# Patient Record
Sex: FEMALE | Race: WHITE | NOT HISPANIC OR LATINO | Employment: OTHER | URBAN - NONMETROPOLITAN AREA
[De-identification: names, ages, dates, MRNs, and addresses within clinical notes are randomized per-mention and may not be internally consistent; named-entity substitution may affect disease eponyms.]

---

## 2016-12-29 LAB — INR PPP: 2.1

## 2017-01-04 ENCOUNTER — ANTICOAGULATION VISIT (OUTPATIENT)
Dept: CARDIOLOGY | Facility: CLINIC | Age: 77
End: 2017-01-04

## 2017-01-13 ENCOUNTER — ANTICOAGULATION VISIT (OUTPATIENT)
Dept: CARDIOLOGY | Facility: CLINIC | Age: 77
End: 2017-01-13

## 2017-01-13 LAB — INR PPP: 2.2

## 2017-01-16 NOTE — PROGRESS NOTES
I have reviewed the notes, assessments, and/or procedures performed by Roberto Hines RN, I concur with her/his documentation of Kirsten Rosales.

## 2017-01-27 ENCOUNTER — ANTICOAGULATION VISIT (OUTPATIENT)
Dept: CARDIOLOGY | Facility: CLINIC | Age: 77
End: 2017-01-27

## 2017-01-27 LAB — INR PPP: 2.3

## 2017-01-27 NOTE — PROGRESS NOTES
I have reviewed the notes, assessments, and/or procedures performed by Roberto Hines RN, and concur with her documentation of Kirsten Rosales.

## 2017-01-27 NOTE — PROGRESS NOTES
DX I48.91, PT instructed to continue same dosing regimen as ordered  Pt is using home monitoring  No s/s redness or bleeding

## 2017-01-31 RX ORDER — ISOSORBIDE MONONITRATE 30 MG/1
TABLET, EXTENDED RELEASE ORAL
Qty: 30 TABLET | Refills: 5 | Status: SHIPPED | OUTPATIENT
Start: 2017-01-31 | End: 2017-08-01 | Stop reason: SDUPTHER

## 2017-02-13 ENCOUNTER — ANTICOAGULATION VISIT (OUTPATIENT)
Dept: CARDIOLOGY | Facility: CLINIC | Age: 77
End: 2017-02-13

## 2017-02-13 LAB
INR PPP: 2.2
INR PPP: 2.6

## 2017-02-14 NOTE — PROGRESS NOTES
I have reviewed the notes, assessments, and/or procedures performed by Roberto Fam RN and concur with her documentation of Kirsten Rosales.

## 2017-03-01 ENCOUNTER — ANTICOAGULATION VISIT (OUTPATIENT)
Dept: CARDIOLOGY | Facility: CLINIC | Age: 77
End: 2017-03-01

## 2017-03-01 LAB — INR PPP: 2.5

## 2017-03-31 ENCOUNTER — ANTICOAGULATION VISIT (OUTPATIENT)
Dept: CARDIOLOGY | Facility: CLINIC | Age: 77
End: 2017-03-31

## 2017-03-31 LAB
INR PPP: 2.3
INR PPP: 2.3

## 2017-04-12 ENCOUNTER — CLINICAL SUPPORT (OUTPATIENT)
Dept: CARDIOLOGY | Facility: CLINIC | Age: 77
End: 2017-04-12

## 2017-04-12 DIAGNOSIS — I49.5 SSS (SICK SINUS SYNDROME) (HCC): Primary | ICD-10-CM

## 2017-04-12 PROCEDURE — 93288 INTERROG EVL PM/LDLS PM IP: CPT | Performed by: INTERNAL MEDICINE

## 2017-04-12 NOTE — PROGRESS NOTES
77 years old patient with history of sick sinus syndrome status post pacemaker implantation.  [employer]ufact[employer] model #2210 and serial number [id number].  Battery is status 2.9 with longevity is 6-7 year.  Date of implant is to 12 2014 and date of interrogation 4/20/2017.  He syndrome ventricle approximately 50% and atrium less than 5% of time.  The patient to underlying atrial fibrillation of the time of pacemaker interrogation.  Programmed DDD at rate of 65 and 120.  AV delay 2:50 PM PV delay to 25.  Sensing A. fib with 2.7 with a lead impedance 300.  Sensing R-wave 6 with a lead impedance 600 and threshold 0.7 at 0.5.  Clinical impression normal function pacemaker with underlying atrial fibrillation recommend to follow up in 6 month

## 2017-04-13 ENCOUNTER — ANTICOAGULATION VISIT (OUTPATIENT)
Dept: CARDIOLOGY | Facility: CLINIC | Age: 77
End: 2017-04-13

## 2017-04-13 LAB — INR PPP: 2.1

## 2017-04-25 RX ORDER — WARFARIN SODIUM 5 MG/1
TABLET ORAL
Qty: 30 TABLET | Refills: 5 | Status: CANCELLED | OUTPATIENT
Start: 2017-04-25

## 2017-04-26 RX ORDER — WARFARIN SODIUM 5 MG/1
5 TABLET ORAL DAILY
Qty: 30 TABLET | Refills: 6 | Status: SHIPPED | OUTPATIENT
Start: 2017-04-26 | End: 2018-01-22 | Stop reason: SDUPTHER

## 2017-05-22 RX ORDER — METOPROLOL SUCCINATE 50 MG/1
TABLET, EXTENDED RELEASE ORAL
Qty: 180 TABLET | Refills: 1 | Status: SHIPPED | OUTPATIENT
Start: 2017-05-22 | End: 2017-11-17 | Stop reason: SDUPTHER

## 2017-06-23 ENCOUNTER — ANTICOAGULATION VISIT (OUTPATIENT)
Dept: CARDIOLOGY | Facility: CLINIC | Age: 77
End: 2017-06-23

## 2017-06-23 LAB — INR PPP: 2.1

## 2017-06-27 ENCOUNTER — OFFICE VISIT (OUTPATIENT)
Dept: CARDIOLOGY | Facility: CLINIC | Age: 77
End: 2017-06-27

## 2017-06-27 VITALS
HEIGHT: 68 IN | DIASTOLIC BLOOD PRESSURE: 78 MMHG | BODY MASS INDEX: 26.22 KG/M2 | WEIGHT: 173 LBS | HEART RATE: 80 BPM | SYSTOLIC BLOOD PRESSURE: 121 MMHG

## 2017-06-27 DIAGNOSIS — I25.119 CORONARY ARTERY DISEASE INVOLVING NATIVE CORONARY ARTERY OF NATIVE HEART WITH ANGINA PECTORIS (HCC): Primary | ICD-10-CM

## 2017-06-27 DIAGNOSIS — I49.5 SSS (SICK SINUS SYNDROME) (HCC): ICD-10-CM

## 2017-06-27 DIAGNOSIS — I10 ESSENTIAL HYPERTENSION: ICD-10-CM

## 2017-06-27 DIAGNOSIS — E78.2 MIXED HYPERLIPIDEMIA: ICD-10-CM

## 2017-06-27 PROCEDURE — 99213 OFFICE O/P EST LOW 20 MIN: CPT | Performed by: INTERNAL MEDICINE

## 2017-06-27 RX ORDER — MIRTAZAPINE 15 MG/1
7.5 TABLET, FILM COATED ORAL NIGHTLY
COMMUNITY
End: 2021-03-17

## 2017-06-27 NOTE — PROGRESS NOTES
Kirsten Rosales  77 y.o. female      1. Coronary artery disease involving native coronary artery of native heart with angina pectoris    2. Mixed hyperlipidemia    3. Essential hypertension    4. SSS (sick sinus syndrome)        Chief complaint -follow-up CAD and Chronic A. fib      History of present Illness-68 to lady who has history of CAD prior stent placement to diagonal artery, had a cardiac catheterization  in February 2016, stents were patent and EF is 45-50%, she also has a pacemaker and she is now in persistent atrial fibrillation and is on chronic Coumadin therapy and INR is Between 2 and 3.  She is doing well she has no complaints.  She is on good medical therapy and her heart rate is well controlled and she is appropriately anticoagulate with Coumadin.  She has no GI symptoms or CNS symptoms            Allergies   Allergen Reactions   • Aspirin Nausea And Vomiting   • Celebrex [Celecoxib]    • Rythmol [Propafenone] Nausea And Vomiting         Past Medical History:   Diagnosis Date   • Atherosclerotic heart disease of native coronary artery with unspecified angina pectoris    • Atrial fibrillation    • Breath shortness    • CAD (coronary artery disease)    • Hyperlipidemia    • Hypertension    • Long term (current) use of anticoagulants    • Sick sinus syndrome    • Unspecified hypothyroidism          Past Surgical History:   Procedure Laterality Date   • CARDIAC CATHETERIZATION      2/2016   • CARDIOVERSION     • CORONARY ANGIOPLASTY     • PACEMAKER IMPLANTATION           History reviewed. No pertinent family history.      Social History     Social History   • Marital status:      Spouse name: N/A   • Number of children: N/A   • Years of education: N/A     Occupational History   • Not on file.     Social History Main Topics   • Smoking status: Former Smoker   • Smokeless tobacco: Never Used   • Alcohol use No   • Drug use: No   • Sexual activity: Defer     Other Topics Concern   • Not on  "file     Social History Narrative         Current Outpatient Prescriptions   Medication Sig Dispense Refill   • isosorbide mononitrate (IMDUR) 30 MG 24 hr tablet TAKE ONE TABLET BY MOUTH DAILY 30 tablet 5   • levothyroxine (SYNTHROID) 112 MCG tablet Take 112 mcg by mouth Daily.     • metFORMIN (GLUCOPHAGE) 500 MG tablet Take 500 mg by mouth 2 (Two) Times a Day.     • metoprolol succinate XL (TOPROL-XL) 50 MG 24 hr tablet TAKE ONE TABLET BY MOUTH TWICE A  tablet 1   • mirtazapine (REMERON) 7.5 MG half tablet Take 15 mg by mouth Every Night.     • Multiple Minerals-Vitamins (CALCIUM-MAGNESIUM-ZINC-D3) tablet Take 1 tablet by mouth Daily.     • Omega-3 Fatty Acids (OMEGA 3 PO) Take 2,000 mg by mouth Daily.     • oxybutynin XL (DITROPAN-XL) 10 MG 24 hr tablet Take 10 mg by mouth Daily.     • pravastatin (PRAVACHOL) 40 MG tablet TAKE ONE TABLET BY MOUTH EVERY EVENING 90 tablet 2   • Ubiquinone (ULTRA COQ10 PO) Take 1 tablet by mouth Daily.     • warfarin (COUMADIN) 5 MG tablet Take 1 tablet by mouth Daily. 30 tablet 6     No current facility-administered medications for this visit.          Review of Systems     Constitution: Denies any fatigue, fever or chills    HENT: Denies any headache, hearing impairment, nasal discharge or sore throat    Eyes: Denies any blurring of vision, or photophobia     Cardivascular - As per history of present illness     Respiratory system-denies any COPD, shortness of breath, sleep apnea.     Endocrine:   history of hyperlipidemia, diabetes       Musculoskeletal:   history of osteoarthritis    Gastrointestinal: No nausea, vomiting, or melena    Genitourinary: No dysuria or hematuria    Neurological:   No history of seizure disorder, stroke, memory problems    Psychiatric/Behavioral:        No history of depression, bipolar disorder or schizophrenia     Hematological- no history of easy bruising or any bleeding diathesis            OBJECTIVE    /78  Pulse 80  Ht 68\" (172.7 " cm)  Wt 173 lb (78.5 kg)  BMI 26.3 kg/m2      Physical Exam     Constitutional: is oriented to person, place, and time.     Skin-warm and dry    Well developed and nourished in no acute distress      Head: Normocephalic and atraumatic.     Eyes: Pupils are equal, round, and reactive to light.     Neck: Neck supple. No bruit in the carotids, no elevation of JVD    Cardiovascular: [place] in the fifth intercostal space, irregular rate, and  Rhythm,  S1 greater than S2, no S3 or S4, no gallop       Pulmonary/Chest:   Air  Entry is equal on both sides  No wheezing or crackles,      Abdominal: Soft.  No hepatosplenomegaly, bowel sounds are present    Musculoskeletal: No kyphoscoliosis, no significant thickening of the joints    Neurological: is alert and oriented to person, place, and time.    cranial nerve are intact .   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                  His behavior is normal.           Procedures      Lab Results   Component Value Date    WBC 5.2 02/18/2016    HGB 13.6 02/18/2016    HCT 41.4 02/18/2016    MCV 89.0 02/18/2016     02/18/2016     Lab Results   Component Value Date    GLUCOSE 121 (H) 02/18/2016    BUN 18 02/18/2016    CREATININE 0.6 02/18/2016    CO2 29 02/18/2016    CALCIUM 9.2 02/18/2016    ALBUMIN 4.4 02/09/2016    AST 55 (H) 02/09/2016    ALT 22 02/09/2016     No results found for: CHOL  Lab Results   Component Value Date    TRIG 138 02/09/2016    TRIG 95 02/10/2015     No components found for: CHOLHDL  Lab Results   Component Value Date    HGBA1C 6.3 (H) 02/09/2016     Lab Results   Component Value Date    TSH 0.98 02/09/2016                  A/P    CAD prior stent placement to diagonal artery, had cardiac catheterization in February 2016 stents are patent.  EF is 45-50%.  No evidence of congestive heart failure.  She is on good medical therapy.    Hypertension well controlled with  isosorbide and Toprol-XL.    Persistent atrial fibrillation warfarin and Toprol-XL for rate control.We will do the blood work today    Hyperlipidemia on pravastatin, and metformin for diabetes and the most recent A1c was 6.3, and takes Synthroid for hypothyroidism.  Follow-up with me in 6 months        Nadeem Alejandro MD  6/27/2017  1:58 PM

## 2017-07-07 ENCOUNTER — APPOINTMENT (OUTPATIENT)
Dept: LAB | Facility: HOSPITAL | Age: 77
End: 2017-07-07

## 2017-07-07 LAB
ALBUMIN SERPL-MCNC: 4.2 G/DL (ref 3.4–4.8)
ALBUMIN/GLOB SERPL: 1.6 G/DL (ref 1.1–1.8)
ALP SERPL-CCNC: 93 U/L (ref 38–126)
ALT SERPL W P-5'-P-CCNC: 32 U/L (ref 9–52)
ANION GAP SERPL CALCULATED.3IONS-SCNC: 12 MMOL/L (ref 5–15)
ARTICHOKE IGE QN: 32 MG/DL (ref 1–129)
AST SERPL-CCNC: 54 U/L (ref 14–36)
BILIRUB SERPL-MCNC: 0.6 MG/DL (ref 0.2–1.3)
BUN BLD-MCNC: 18 MG/DL (ref 7–21)
BUN/CREAT SERPL: 26.5 (ref 7–25)
CALCIUM SPEC-SCNC: 9.5 MG/DL (ref 8.4–10.2)
CHLORIDE SERPL-SCNC: 101 MMOL/L (ref 95–110)
CHOLEST SERPL-MCNC: 135 MG/DL (ref 0–199)
CO2 SERPL-SCNC: 26 MMOL/L (ref 22–31)
CREAT BLD-MCNC: 0.68 MG/DL (ref 0.5–1)
DEPRECATED RDW RBC AUTO: 46 FL (ref 36.4–46.3)
ERYTHROCYTE [DISTWIDTH] IN BLOOD BY AUTOMATED COUNT: 13.9 % (ref 11.5–14.5)
GFR SERPL CREATININE-BSD FRML MDRD: 83 ML/MIN/1.73 (ref 60–90)
GLOBULIN UR ELPH-MCNC: 2.6 GM/DL (ref 2.3–3.5)
GLUCOSE BLD-MCNC: 105 MG/DL (ref 60–100)
HCT VFR BLD AUTO: 44.7 % (ref 35–45)
HDLC SERPL-MCNC: 52 MG/DL (ref 60–200)
HGB BLD-MCNC: 14.5 G/DL (ref 12–15.5)
LDLC/HDLC SERPL: 0.98 {RATIO} (ref 0–3.22)
MCH RBC QN AUTO: 29.3 PG (ref 26.5–34)
MCHC RBC AUTO-ENTMCNC: 32.4 G/DL (ref 31.4–36)
MCV RBC AUTO: 90.3 FL (ref 80–98)
PLATELET # BLD AUTO: 260 10*3/MM3 (ref 150–450)
PMV BLD AUTO: 12 FL (ref 8–12)
POTASSIUM BLD-SCNC: 4.9 MMOL/L (ref 3.5–5.1)
PROT SERPL-MCNC: 6.8 G/DL (ref 6.3–8.6)
RBC # BLD AUTO: 4.95 10*6/MM3 (ref 3.77–5.16)
SODIUM BLD-SCNC: 139 MMOL/L (ref 137–145)
TRIGL SERPL-MCNC: 159 MG/DL (ref 20–199)
WBC NRBC COR # BLD: 5.72 10*3/MM3 (ref 3.2–9.8)

## 2017-07-07 PROCEDURE — 80061 LIPID PANEL: CPT | Performed by: INTERNAL MEDICINE

## 2017-07-07 PROCEDURE — 36415 COLL VENOUS BLD VENIPUNCTURE: CPT | Performed by: INTERNAL MEDICINE

## 2017-07-07 PROCEDURE — 80053 COMPREHEN METABOLIC PANEL: CPT | Performed by: INTERNAL MEDICINE

## 2017-07-07 PROCEDURE — 85027 COMPLETE CBC AUTOMATED: CPT | Performed by: INTERNAL MEDICINE

## 2017-08-01 RX ORDER — ISOSORBIDE MONONITRATE 30 MG/1
TABLET, EXTENDED RELEASE ORAL
Qty: 30 TABLET | Refills: 4 | Status: SHIPPED | OUTPATIENT
Start: 2017-08-01 | End: 2017-12-27 | Stop reason: SDUPTHER

## 2017-08-25 ENCOUNTER — ANTICOAGULATION VISIT (OUTPATIENT)
Dept: CARDIOLOGY | Facility: CLINIC | Age: 77
End: 2017-08-25

## 2017-08-25 LAB — INR PPP: 2.1

## 2017-09-11 RX ORDER — PRAVASTATIN SODIUM 40 MG
TABLET ORAL
Qty: 90 TABLET | Refills: 1 | Status: CANCELLED | OUTPATIENT
Start: 2017-09-11

## 2017-09-12 RX ORDER — PRAVASTATIN SODIUM 40 MG
40 TABLET ORAL DAILY
Qty: 90 TABLET | Refills: 2 | Status: SHIPPED | OUTPATIENT
Start: 2017-09-12 | End: 2018-08-23 | Stop reason: SDUPTHER

## 2017-09-15 ENCOUNTER — ANTICOAGULATION VISIT (OUTPATIENT)
Dept: CARDIOLOGY | Facility: CLINIC | Age: 77
End: 2017-09-15

## 2017-09-15 LAB — INR PPP: 2.5

## 2017-10-06 ENCOUNTER — ANTICOAGULATION VISIT (OUTPATIENT)
Dept: CARDIOLOGY | Facility: CLINIC | Age: 77
End: 2017-10-06

## 2017-10-06 LAB — INR PPP: 2.7

## 2017-10-11 ENCOUNTER — CLINICAL SUPPORT (OUTPATIENT)
Dept: CARDIOLOGY | Facility: CLINIC | Age: 77
End: 2017-10-11

## 2017-10-11 DIAGNOSIS — I49.5 SSS (SICK SINUS SYNDROME) (HCC): Primary | ICD-10-CM

## 2017-10-11 PROCEDURE — 93288 INTERROG EVL PM/LDLS PM IP: CPT | Performed by: INTERNAL MEDICINE

## 2017-10-11 NOTE — PROGRESS NOTES
77 years old patient with history of sick sinus syndrome status post pacemaker implantation.  [employer] model 2210 and serial #[id number].  Implantation date 2/12/2014 and interrogation 10/11/2017.  Battery voltage is good for 5-6 year.  Pacing the atrium about to pursue ventricle 50% of time.  Pacing programmed DDD at 60 and 1 20 bpm.  Sensing P wave 4.6 with a lead impedance 3:30.  Patient atrial fibrillation on oral intake ablations.  Sensing R-wave 8 with a lead impedance 650 and threshold 0.5 at 0.5.  Clinical impression normal function pacemaker recommend to follow up in 6 month

## 2017-10-27 ENCOUNTER — ANTICOAGULATION VISIT (OUTPATIENT)
Dept: CARDIOLOGY | Facility: CLINIC | Age: 77
End: 2017-10-27

## 2017-10-27 LAB — INR PPP: 2.4

## 2017-11-17 RX ORDER — METOPROLOL SUCCINATE 50 MG/1
TABLET, EXTENDED RELEASE ORAL
Qty: 180 TABLET | Refills: 0 | Status: SHIPPED | OUTPATIENT
Start: 2017-11-17 | End: 2018-02-14 | Stop reason: SDUPTHER

## 2017-12-07 ENCOUNTER — ANTICOAGULATION VISIT (OUTPATIENT)
Dept: CARDIOLOGY | Facility: CLINIC | Age: 77
End: 2017-12-07

## 2017-12-07 LAB — INR PPP: 2.5

## 2017-12-27 RX ORDER — ISOSORBIDE MONONITRATE 30 MG/1
TABLET, EXTENDED RELEASE ORAL
Qty: 30 TABLET | Refills: 3 | Status: CANCELLED | OUTPATIENT
Start: 2017-12-27

## 2017-12-27 RX ORDER — ISOSORBIDE MONONITRATE 30 MG/1
30 TABLET, EXTENDED RELEASE ORAL DAILY
Qty: 30 TABLET | Refills: 4 | Status: SHIPPED | OUTPATIENT
Start: 2017-12-27 | End: 2018-05-29 | Stop reason: SDUPTHER

## 2017-12-28 ENCOUNTER — ANTICOAGULATION VISIT (OUTPATIENT)
Dept: CARDIAC SURGERY | Facility: CLINIC | Age: 77
End: 2017-12-28

## 2017-12-28 DIAGNOSIS — Z79.01 LONG-TERM (CURRENT) USE OF ANTICOAGULANTS: ICD-10-CM

## 2017-12-28 DIAGNOSIS — I48.91 ATRIAL FIBRILLATION, UNSPECIFIED TYPE (HCC): ICD-10-CM

## 2017-12-28 LAB — INR PPP: 2.5

## 2017-12-28 RX ORDER — OXYBUTYNIN CHLORIDE 10 MG/1
10 TABLET, EXTENDED RELEASE ORAL DAILY
COMMUNITY
Start: 2017-10-03 | End: 2018-10-04

## 2017-12-28 RX ORDER — LEVOTHYROXINE SODIUM 112 UG/1
100 TABLET ORAL DAILY
COMMUNITY

## 2017-12-28 NOTE — PROGRESS NOTES
Received above INR from [facility]. Spoke with pt and instructed [facility] would be managing her INR. Confirmed pt has 5MG COUMADIN TABLET. PT states she has been taking 2.5mg M, W, and F for several months. Adjusted pt's dose in computer to reflect what pt reports actually taking. PT was given [facility] direct phone number and educated pt to call [facility] if meds change or with any bleeding issues/concerns. PT states she is having endo procedure and will begin holding Coumadin on 12/31. PT on Coumadin for afib and has never had blood clot. PT states she checks INR every 3 weeks. PT will check INR on 01/18.   Electronically signed by MICKIE Correa

## 2018-01-02 ENCOUNTER — OFFICE VISIT (OUTPATIENT)
Dept: CARDIOLOGY | Facility: CLINIC | Age: 78
End: 2018-01-02

## 2018-01-02 VITALS
HEART RATE: 72 BPM | HEIGHT: 68 IN | DIASTOLIC BLOOD PRESSURE: 70 MMHG | WEIGHT: 178 LBS | BODY MASS INDEX: 26.98 KG/M2 | SYSTOLIC BLOOD PRESSURE: 130 MMHG

## 2018-01-02 DIAGNOSIS — E78.2 MIXED HYPERLIPIDEMIA: ICD-10-CM

## 2018-01-02 DIAGNOSIS — I25.119 CORONARY ARTERY DISEASE INVOLVING NATIVE CORONARY ARTERY OF NATIVE HEART WITH ANGINA PECTORIS (HCC): Primary | ICD-10-CM

## 2018-01-02 DIAGNOSIS — R68.89 OTHER GENERAL SYMPTOMS AND SIGNS: ICD-10-CM

## 2018-01-02 DIAGNOSIS — I49.5 SSS (SICK SINUS SYNDROME) (HCC): ICD-10-CM

## 2018-01-02 DIAGNOSIS — I10 ESSENTIAL HYPERTENSION: ICD-10-CM

## 2018-01-02 DIAGNOSIS — I48.20 CHRONIC ATRIAL FIBRILLATION (HCC): ICD-10-CM

## 2018-01-02 PROCEDURE — 99214 OFFICE O/P EST MOD 30 MIN: CPT | Performed by: INTERNAL MEDICINE

## 2018-01-02 RX ORDER — DOCUSATE SODIUM 100 MG/1
100 CAPSULE, LIQUID FILLED ORAL AS NEEDED
COMMUNITY
End: 2021-03-17

## 2018-01-02 RX ORDER — PHENOL 1.4 %
20 AEROSOL, SPRAY (ML) MUCOUS MEMBRANE AS NEEDED
COMMUNITY
End: 2021-03-17

## 2018-01-02 NOTE — PROGRESS NOTES
Kirsten Rosales  77 y.o. female    1/2/2018  1. Coronary artery disease involving native coronary artery of native heart with angina pectoris    2. Mixed hyperlipidemia    3. Essential hypertension    4. SSS (sick sinus syndrome)    5. Other general symptoms and signs     6. Chronic atrial fibrillation        Chief complaint -follow-up CAD and Chronic Atrial fibrillation      History of present Illness-77 yr lady who has history of CAD prior stent placement to diagonal artery, had a cardiac catheterization  in February 2016, stents were patent and EF is 45-50%, she also has a pacemaker and she is now in persistent atrial fibrillation and is on chronic Coumadin therapy and INR is Between 2 and 3.  She is doing well she has no complaints.  She is on good medical therapy and her heart rate is well controlled and she is appropriately anticoagulate with Coumadin.  She has no GI symptoms or CNS symptoms.She is going to stop the Coumadin for 4 days prior to her colonoscopy soon and then we'll restart when okay with Dr. Juan.            Allergies   Allergen Reactions   • Aspirin Nausea And Vomiting   • Celebrex [Celecoxib]    • Rythmol [Propafenone] Nausea And Vomiting         Past Medical History:   Diagnosis Date   • Atherosclerotic heart disease of native coronary artery with unspecified angina pectoris    • Atrial fibrillation    • Breath shortness    • CAD (coronary artery disease)    • Hyperlipidemia    • Hypertension    • Long term (current) use of anticoagulants    • Sick sinus syndrome    • Unspecified hypothyroidism          Past Surgical History:   Procedure Laterality Date   • BACK SURGERY     • CARDIAC CATHETERIZATION      2/2016   • CARDIOVERSION     • CORONARY ANGIOPLASTY     • PACEMAKER IMPLANTATION           History reviewed. No pertinent family history.      Social History     Social History   • Marital status:      Spouse name: N/A   • Number of children: N/A   • Years of education: N/A      Occupational History   • Not on file.     Social History Main Topics   • Smoking status: Former Smoker   • Smokeless tobacco: Never Used   • Alcohol use No   • Drug use: No   • Sexual activity: Defer     Other Topics Concern   • Not on file     Social History Narrative         Current Outpatient Prescriptions   Medication Sig Dispense Refill   • docusate sodium (COLACE) 100 MG capsule Take 100 mg by mouth 2 (Two) Times a Day.     • isosorbide mononitrate (IMDUR) 30 MG 24 hr tablet Take 1 tablet by mouth Daily. 30 tablet 4   • levothyroxine (SYNTHROID, LEVOTHROID) 112 MCG tablet Take 112 mcg by mouth Daily.     • Melatonin 10 MG tablet Take 20 mg by mouth Every Night.     • metFORMIN (GLUCOPHAGE) 500 MG tablet Take 500 mg by mouth 2 (Two) Times a Day.     • metoprolol succinate XL (TOPROL-XL) 50 MG 24 hr tablet TAKE ONE TABLET BY MOUTH TWICE A  tablet 0   • mirtazapine (REMERON) 7.5 MG half tablet Take 7.5 mg by mouth Every Night.     • Multiple Minerals-Vitamins (CALCIUM-MAGNESIUM-ZINC-D3) tablet Take 1 tablet by mouth Daily.     • Omega-3 Fatty Acids (OMEGA 3 PO) Take 2,000 mg by mouth Daily.     • oxybutynin XL (DITROPAN-XL) 10 MG 24 hr tablet Take 10 mg by mouth.     • pravastatin (PRAVACHOL) 40 MG tablet Take 1 tablet by mouth Daily. 90 tablet 2   • Ubiquinone (ULTRA COQ10 PO) Take 1 tablet by mouth Daily.     • warfarin (COUMADIN) 5 MG tablet Take 1 tablet by mouth Daily. 30 tablet 6     No current facility-administered medications for this visit.          Review of Systems     Constitution: Denies any fatigue, fever or chills    HENT: Denies any headache, hearing impairment, nasal discharge or sore throat    Eyes: Denies any blurring of vision, or photophobia     Cardivascular - As per history of present illness     Respiratory system-denies any COPD, shortness of breath, sleep apnea.     Endocrine:   history of hyperlipidemia, diabetes       Musculoskeletal:   history of  "osteoarthritis    Gastrointestinal: No nausea, vomiting, or melena    Genitourinary: No dysuria or hematuria    Neurological:   No history of seizure disorder, stroke, memory problems    Psychiatric/Behavioral:        No history of depression, bipolar disorder or schizophrenia     Hematological- no history of easy bruising or any bleeding diathesis            OBJECTIVE    /70  Pulse 72  Ht 172.7 cm (68\")  Wt 80.7 kg (178 lb)  BMI 27.06 kg/m2      Physical Exam     Constitutional: is oriented to person, place, and time.     Skin-warm and dry    Well developed and nourished in no acute distress      Head: Normocephalic and atraumatic.     Eyes: Pupils are equal, round, and reactive to light.     Neck: Neck supple. No bruit in the carotids, no elevation of JVD    Cardiovascular: [place] in the fifth intercostal space, irregular rate, and  Rhythm,  S1 greater than S2, no S3 or S4, no gallop       Pulmonary/Chest:   Air  Entry is equal on both sides  No wheezing or crackles,      Abdominal: Soft.  No hepatosplenomegaly, bowel sounds are present    Musculoskeletal: No kyphoscoliosis, no significant thickening of the joints    Neurological: is alert and oriented to person, place, and time.    cranial nerve are intact .   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                  His behavior is normal.           Procedures      Lab Results   Component Value Date    WBC 5.72 07/07/2017    HGB 14.5 07/07/2017    HCT 44.7 07/07/2017    MCV 90.3 07/07/2017     07/07/2017     Lab Results   Component Value Date    GLUCOSE 105 (H) 07/07/2017    BUN 18 07/07/2017    CREATININE 0.68 07/07/2017    EGFRIFNONA 83 07/07/2017    BCR 26.5 (H) 07/07/2017    CO2 26.0 07/07/2017    CALCIUM 9.5 07/07/2017    ALBUMIN 4.20 07/07/2017    LABIL2 1.6 07/07/2017    AST 54 (H) 07/07/2017    ALT 32 07/07/2017     Lab Results   Component Value Date "    CHOL 135 07/07/2017     Lab Results   Component Value Date    TRIG 159 07/07/2017    TRIG 138 02/09/2016    TRIG 95 02/10/2015     No components found for: CHOLHDL  Lab Results   Component Value Date    HGBA1C 6.3 (H) 02/09/2016     Lab Results   Component Value Date    TSH 0.98 02/09/2016                  A/P    CAD prior stent placement to diagonal artery, had cardiac catheterization in February 2016 stents are patent.  EF is 45-50%.  No evidence of congestive heart failure.  She is on good medical therapy.    Hypertension well controlled with isosorbide and Toprol-XL.    Persistent atrial fibrillation warfarin and Toprol-XL for rate control.    Hyperlipidemia on pravastatin, and metformin for diabetes and the most recent A1c was 6.3, and takes Synthroid for hypothyroidism.      Follow-up with me in 6 months        Nadeem Alejandro MD  1/2/2018  1:24 PM

## 2018-01-04 ENCOUNTER — ANESTHESIA (OUTPATIENT)
Dept: GASTROENTEROLOGY | Facility: HOSPITAL | Age: 78
End: 2018-01-04

## 2018-01-04 ENCOUNTER — HOSPITAL ENCOUNTER (OUTPATIENT)
Facility: HOSPITAL | Age: 78
Setting detail: HOSPITAL OUTPATIENT SURGERY
Discharge: HOME | End: 2018-01-04
Attending: INTERNAL MEDICINE | Admitting: INTERNAL MEDICINE

## 2018-01-04 ENCOUNTER — ANESTHESIA EVENT (OUTPATIENT)
Dept: GASTROENTEROLOGY | Facility: HOSPITAL | Age: 78
End: 2018-01-04

## 2018-01-04 VITALS
SYSTOLIC BLOOD PRESSURE: 106 MMHG | TEMPERATURE: 98 F | WEIGHT: 174.16 LBS | HEART RATE: 65 BPM | HEIGHT: 68 IN | DIASTOLIC BLOOD PRESSURE: 57 MMHG | OXYGEN SATURATION: 96 % | BODY MASS INDEX: 26.4 KG/M2 | RESPIRATION RATE: 18 BRPM

## 2018-01-04 DIAGNOSIS — Z86.010 HISTORY OF COLONIC POLYPS: ICD-10-CM

## 2018-01-04 PROCEDURE — 25010000002 PROPOFOL 10 MG/ML EMULSION: Performed by: NURSE ANESTHETIST, CERTIFIED REGISTERED

## 2018-01-04 PROCEDURE — 88305 TISSUE EXAM BY PATHOLOGIST: CPT | Performed by: PATHOLOGY

## 2018-01-04 PROCEDURE — 88305 TISSUE EXAM BY PATHOLOGIST: CPT | Performed by: INTERNAL MEDICINE

## 2018-01-04 DEVICE — CLIPPING DEVICE
Type: IMPLANTABLE DEVICE | Site: CECUM | Status: FUNCTIONAL
Brand: RESOLUTION CLIP

## 2018-01-04 RX ORDER — MEPERIDINE HYDROCHLORIDE 50 MG/ML
12.5 INJECTION INTRAMUSCULAR; INTRAVENOUS; SUBCUTANEOUS
Status: DISCONTINUED | OUTPATIENT
Start: 2018-01-04 | End: 2018-01-04 | Stop reason: HOSPADM

## 2018-01-04 RX ORDER — PROPOFOL 10 MG/ML
VIAL (ML) INTRAVENOUS AS NEEDED
Status: DISCONTINUED | OUTPATIENT
Start: 2018-01-04 | End: 2018-01-04 | Stop reason: SURG

## 2018-01-04 RX ORDER — PROMETHAZINE HYDROCHLORIDE 25 MG/ML
12.5 INJECTION, SOLUTION INTRAMUSCULAR; INTRAVENOUS ONCE AS NEEDED
Status: DISCONTINUED | OUTPATIENT
Start: 2018-01-04 | End: 2018-01-04 | Stop reason: HOSPADM

## 2018-01-04 RX ORDER — LIDOCAINE HYDROCHLORIDE 10 MG/ML
INJECTION, SOLUTION INFILTRATION; PERINEURAL AS NEEDED
Status: DISCONTINUED | OUTPATIENT
Start: 2018-01-04 | End: 2018-01-04 | Stop reason: SURG

## 2018-01-04 RX ORDER — PROMETHAZINE HYDROCHLORIDE 25 MG/1
25 SUPPOSITORY RECTAL ONCE AS NEEDED
Status: DISCONTINUED | OUTPATIENT
Start: 2018-01-04 | End: 2018-01-04 | Stop reason: HOSPADM

## 2018-01-04 RX ORDER — DEXAMETHASONE SODIUM PHOSPHATE 4 MG/ML
8 INJECTION, SOLUTION INTRA-ARTICULAR; INTRALESIONAL; INTRAMUSCULAR; INTRAVENOUS; SOFT TISSUE ONCE AS NEEDED
Status: DISCONTINUED | OUTPATIENT
Start: 2018-01-04 | End: 2018-01-04 | Stop reason: HOSPADM

## 2018-01-04 RX ORDER — DEXTROSE AND SODIUM CHLORIDE 5; .45 G/100ML; G/100ML
20 INJECTION, SOLUTION INTRAVENOUS CONTINUOUS
Status: DISCONTINUED | OUTPATIENT
Start: 2018-01-04 | End: 2018-01-04 | Stop reason: HOSPADM

## 2018-01-04 RX ORDER — ONDANSETRON 2 MG/ML
4 INJECTION INTRAMUSCULAR; INTRAVENOUS ONCE AS NEEDED
Status: DISCONTINUED | OUTPATIENT
Start: 2018-01-04 | End: 2018-01-04 | Stop reason: HOSPADM

## 2018-01-04 RX ORDER — PROMETHAZINE HYDROCHLORIDE 25 MG/1
25 TABLET ORAL ONCE AS NEEDED
Status: DISCONTINUED | OUTPATIENT
Start: 2018-01-04 | End: 2018-01-04 | Stop reason: HOSPADM

## 2018-01-04 RX ADMIN — PROPOFOL 20 MG: 10 INJECTION, EMULSION INTRAVENOUS at 11:41

## 2018-01-04 RX ADMIN — PROPOFOL 20 MG: 10 INJECTION, EMULSION INTRAVENOUS at 11:25

## 2018-01-04 RX ADMIN — PROPOFOL 20 MG: 10 INJECTION, EMULSION INTRAVENOUS at 11:35

## 2018-01-04 RX ADMIN — PROPOFOL 20 MG: 10 INJECTION, EMULSION INTRAVENOUS at 11:28

## 2018-01-04 RX ADMIN — LIDOCAINE HYDROCHLORIDE 50 MG: 10 INJECTION, SOLUTION INFILTRATION; PERINEURAL at 11:23

## 2018-01-04 RX ADMIN — PROPOFOL 20 MG: 10 INJECTION, EMULSION INTRAVENOUS at 11:30

## 2018-01-04 RX ADMIN — DEXTROSE AND SODIUM CHLORIDE 20 ML/HR: 5; 450 INJECTION, SOLUTION INTRAVENOUS at 10:30

## 2018-01-04 RX ADMIN — PROPOFOL 20 MG: 10 INJECTION, EMULSION INTRAVENOUS at 11:38

## 2018-01-04 RX ADMIN — PROPOFOL 80 MG: 10 INJECTION, EMULSION INTRAVENOUS at 11:23

## 2018-01-04 NOTE — PLAN OF CARE
Problem: Patient Care Overview (Adult)  Goal: Plan of Care Review  Outcome: Ongoing (interventions implemented as appropriate)   01/04/18 1144   Coping/Psychosocial Response Interventions   Plan Of Care Reviewed With patient   Patient Care Overview   Progress no change   Outcome Evaluation   Outcome Summary/Follow up Plan vss       Problem: GI Endoscopy (Adult)  Goal: Signs and Symptoms of Listed Potential Problems Will be Absent or Manageable (GI Endoscopy)  Outcome: Ongoing (interventions implemented as appropriate)   01/04/18 1144   GI Endoscopy   Problems Assessed (GI Endoscopy) all   Problems Present (GI Endoscopy) none

## 2018-01-04 NOTE — H&P
Misael Charles DO,[facility]  Gastroenterology  Hepatology  Endoscopy  Board Certified in Internal Medicine and gastroenterology  [address], [address]  [place]. [address]  - [phone]   F - [phone]     GASTROENTEROLOGY HISTORY AND PHYSICAL  NOTE   MIASEL CHARLES DO.         SUBJECTIVE:   1/4/2018    Name: Kirsten Rosales  DOD: 1940        Chief Complaint:       Subjective : Personal history of colon polyps    Patient is 77 y.o. female presents with desire for elective colonoscopy.      ROS/HISTORY/ CURRENT MEDICATIONS/OBJECTIVE/VS/PE:   Review of Systems:   Review of Systems    History:     Past Medical History:   Diagnosis Date   • Atherosclerotic heart disease of native coronary artery with unspecified angina pectoris    • Atrial fibrillation    • Breath shortness    • CAD (coronary artery disease)    • Hyperlipidemia    • Hypertension    • Long term (current) use of anticoagulants    • Sick sinus syndrome    • Unspecified hypothyroidism      Past Surgical History:   Procedure Laterality Date   • BACK SURGERY     • CARDIAC CATHETERIZATION      2/2016   • CARDIAC PACEMAKER PLACEMENT     • CARDIOVERSION     • CORONARY ANGIOPLASTY     • PACEMAKER IMPLANTATION       History reviewed. No pertinent family history.  Social History   Substance Use Topics   • Smoking status: Former Smoker   • Smokeless tobacco: Never Used   • Alcohol use No     Prescriptions Prior to Admission   Medication Sig Dispense Refill Last Dose   • docusate sodium (COLACE) 100 MG capsule Take 100 mg by mouth 2 (Two) Times a Day.   1/3/2018 at Unknown time   • isosorbide mononitrate (IMDUR) 30 MG 24 hr tablet Take 1 tablet by mouth Daily. 30 tablet 4 1/4/2018 at Unknown time   • levothyroxine (SYNTHROID, LEVOTHROID) 112 MCG tablet Take 112 mcg by mouth Daily.   1/3/2018 at Unknown time   • Melatonin 10 MG tablet Take 20 mg by mouth Every Night.   1/3/2018 at Unknown time   • metFORMIN (GLUCOPHAGE) 500 MG tablet  Take 500 mg by mouth 2 (Two) Times a Day.   1/3/2018 at Unknown time   • metoprolol succinate XL (TOPROL-XL) 50 MG 24 hr tablet TAKE ONE TABLET BY MOUTH TWICE A  tablet 0 1/4/2018 at Unknown time   • mirtazapine (REMERON) 7.5 MG half tablet Take 7.5 mg by mouth Every Night.   1/3/2018 at Unknown time   • Multiple Minerals-Vitamins (CALCIUM-MAGNESIUM-ZINC-D3) tablet Take 1 tablet by mouth Daily.   1/3/2018 at Unknown time   • Omega-3 Fatty Acids (OMEGA 3 PO) Take 2,000 mg by mouth Daily.   1/3/2018 at Unknown time   • oxybutynin XL (DITROPAN-XL) 10 MG 24 hr tablet Take 10 mg by mouth.   1/3/2018 at Unknown time   • pravastatin (PRAVACHOL) 40 MG tablet Take 1 tablet by mouth Daily. 90 tablet 2 1/3/2018 at Unknown time   • Ubiquinone (ULTRA COQ10 PO) Take 1 tablet by mouth Daily.   1/3/2018 at Unknown time   • warfarin (COUMADIN) 5 MG tablet Take 1 tablet by mouth Daily. 30 tablet 6 12/31/2017     Allergies:  Aspirin; Celebrex [celecoxib]; and Rythmol [propafenone]    I have reviewed the patients medical history, surgical history and family history in the available medical record system.     Current Medications:     Current Facility-Administered Medications   Medication Dose Route Frequency Provider Last Rate Last Dose   • dexamethasone (DECADRON) injection 8 mg  8 mg Intravenous Once PRN Elizabeth Gallagher CRNA       • dextrose 5 % and sodium chloride 0.45 % infusion  20 mL/hr Intravenous Continuous Albino Juan DO 20 mL/hr at 01/04/18 1030 20 mL/hr at 01/04/18 1030   • meperidine (DEMEROL) injection 12.5 mg  12.5 mg Intravenous Q5 Min PRN Elizabeth Gallagher CRNA       • ondansetron (ZOFRAN) injection 4 mg  4 mg Intravenous Once PRN Elizabeth Gallagher CRNA       • promethazine (PHENERGAN) injection 12.5 mg  12.5 mg Intravenous Once PRN Elizabeth Gallagher CRNA        Or   • promethazine (PHENERGAN) injection 12.5 mg  12.5 mg Intramuscular Once PRN Elizabeth Gallagher CRNA        Or   • promethazine  (PHENERGAN) suppository 25 mg  25 mg Rectal Once PRN Elizabeth Gallagher CRNA        Or   • promethazine (PHENERGAN) tablet 25 mg  25 mg Oral Once PRN Elizabeth Gallagher CRNA           Objective     Physical Exam:   Temp:  [97.6 °F (36.4 °C)] 97.6 °F (36.4 °C)  Heart Rate:  [76] 76  Resp:  [18] 18  BP: (152)/(66) 152/66    Physical Exam:  General Appearance:    Alert, cooperative, in no acute distress   Head:    Normocephalic, without obvious abnormality, atraumatic   Eyes:            Lids and lashes normal, conjunctivae and sclerae normal, no   icterus, no pallor, corneas clear, PERRLA   Ears:    Ears appear intact with no abnormalities noted   Throat:   No oral lesions, no thrush, oral mucosa moist   Neck:   No adenopathy, supple, trachea midline, no thyromegaly, no     carotid bruit, no JVD   Back:     No kyphosis present, no scoliosis present, no skin lesions,       erythema or scars, no tenderness to percussion or                   palpation,   range of motion normal   Lungs:     Clear to auscultation,respirations regular, even and                   unlabored    Heart:    Regular rhythm and normal rate, normal S1 and S2, no            murmur, no gallop, no rub, no click   Breast Exam:    Deferred   Abdomen:     Normal bowel sounds, no masses, no organomegaly, soft        non-tender, non-distended, no guarding, no rebound                 tenderness   Genitalia:    Deferred   Extremities:   Moves all extremities well, no edema, no cyanosis, no              redness   Pulses:   Pulses palpable and equal bilaterally   Skin:   No bleeding, bruising or rash   Lymph nodes:   No palpable adenopathy   Neurologic:   Cranial nerves 2 - 12 grossly intact, sensation intact, DTR        present and equal bilaterally      Results Review:     Lab Results   Component Value Date    WBC 5.72 07/07/2017    WBC 5.2 02/18/2016    WBC 5.4 02/09/2016    HGB 14.5 07/07/2017    HGB 13.6 02/18/2016    HGB 14.4 02/09/2016    HCT 44.7  07/07/2017    HCT 41.4 02/18/2016    HCT 44.5 02/09/2016     07/07/2017     02/18/2016     02/09/2016             No results found for: LIPASE  Lab Results   Component Value Date    INR 2.50 12/28/2017    INR 2.50 12/07/2017    INR 2.40 10/27/2017          Radiology Review:  Imaging Results (last 72 hours)     ** No results found for the last 72 hours. **           I reviewed the patient's new clinical results.  I reviewed the patient's new imaging results and agree with the interpretation.     ASSESSMENT/PLAN:   ASSESSMENT:   1.  Personal history of colon polyps    PLAN:   1.  Colonoscopy    Risk and benefits associated with the procedure are reviewed with the patient.  The patient wishes to proceed      Albino Juan DO  01/04/18  11:12 AM

## 2018-01-04 NOTE — ANESTHESIA POSTPROCEDURE EVALUATION
Patient: Kirsten Rosales    Procedure Summary     Date Anesthesia Start Anesthesia Stop Room / Location    01/04/18 1121 1148 [facility] ENDOSCOPY 2 / [facility] ENDOSCOPY       Procedure Diagnosis Surgeon Provider    COLONOSCOPY (N/A ) History of colonic polyps  (History of colonic polyps [Z86.010]) Albino Juan, DO Elizabeth Gallagher CRNA          Anesthesia Type: MAC  Last vitals  BP   152/66 (01/04/18 1002)   Temp   97.6 °F (36.4 °C) (01/04/18 1002)   Pulse   76 (01/04/18 1002)   Resp   18 (01/04/18 1002)     SpO2   96 % (01/04/18 1002)     Post Anesthesia Care and Evaluation    Patient location during evaluation: bedside  Patient participation: complete - patient participated  Level of consciousness: responsive to verbal stimuli  Pain management: adequate  Airway patency: patent  Anesthetic complications: No anesthetic complications    Cardiovascular status: acceptable  Respiratory status: acceptable  Hydration status: acceptable

## 2018-01-04 NOTE — ANESTHESIA PREPROCEDURE EVALUATION
Anesthesia Evaluation       NPO Liquid Status: > 2 hours     Airway   Mallampati: II  TM distance: <3 FB  Neck ROM: full  possible difficult intubation  Dental    (+) upper dentures and lower dentures    Pulmonary - normal exam   Cardiovascular     Beta blocker given within 24 hours of surgery  Rhythm: irregular        Neuro/Psych  GI/Hepatic/Renal/Endo      Musculoskeletal     Abdominal  - normal exam   Substance History      OB/GYN          Other                                                Anesthesia Plan    ASA 3     MAC     intravenous induction   Anesthetic plan and risks discussed with patient.

## 2018-01-05 LAB
LAB AP CASE REPORT: NORMAL
Lab: NORMAL
PATH REPORT.FINAL DX SPEC: NORMAL
PATH REPORT.GROSS SPEC: NORMAL

## 2018-01-11 ENCOUNTER — ANTICOAGULATION VISIT (OUTPATIENT)
Dept: CARDIAC SURGERY | Facility: CLINIC | Age: 78
End: 2018-01-11

## 2018-01-11 DIAGNOSIS — I48.91 ATRIAL FIBRILLATION, UNSPECIFIED TYPE (HCC): ICD-10-CM

## 2018-01-11 DIAGNOSIS — Z79.01 LONG-TERM (CURRENT) USE OF ANTICOAGULANTS: ICD-10-CM

## 2018-01-11 LAB — INR PPP: 1.3

## 2018-01-11 NOTE — PROGRESS NOTES
Spoke to pt over the phone who self-tested today with home meter.  Pt states her coumadin was on hold for 6 days for procedure and she just resumed coumadin last night.  Pt denies medications changes or bleeding issues.  Adjusted coumadin dose for today only and instructed pt to limit green intake for three days.  Instructed pt to self-test again next Friday, January the 19th.  Pt verbalizes.  Patient instructed regarding medication; results given and questions answered. Nutritional counseling given.  Dietary factors affecting therapy addressed.  Patient instructed to monitor for excessive bruising or bleeding.          This document has been electronically signed by MICKIE Marx on January 11, 2018 5:46 PM

## 2018-01-19 ENCOUNTER — ANTICOAGULATION VISIT (OUTPATIENT)
Dept: CARDIAC SURGERY | Facility: CLINIC | Age: 78
End: 2018-01-19

## 2018-01-19 DIAGNOSIS — I48.91 ATRIAL FIBRILLATION, UNSPECIFIED TYPE (HCC): ICD-10-CM

## 2018-01-19 DIAGNOSIS — Z79.01 LONG-TERM (CURRENT) USE OF ANTICOAGULANTS: ICD-10-CM

## 2018-01-19 LAB — INR PPP: 2.2

## 2018-01-19 NOTE — PROGRESS NOTES
Received above [facility]R from [facility]. Spoke with with who states she took extra dose then resumed previous usual dose. PT denies any med changes or bleeding issues. PT states she test every 3 weeks. PT will retest on 2/8. PT instructed to call CC if any med change or issues arrive prior to then. PT verbalizes understanding.   Electronically signed by MICKIE Correa

## 2018-01-22 RX ORDER — WARFARIN SODIUM 5 MG/1
TABLET ORAL
Qty: 30 TABLET | Refills: 5 | OUTPATIENT
Start: 2018-01-22

## 2018-01-22 RX ORDER — WARFARIN SODIUM 5 MG/1
5 TABLET ORAL DAILY
Qty: 30 TABLET | Refills: 6 | Status: SHIPPED | OUTPATIENT
Start: 2018-01-22 | End: 2018-10-14 | Stop reason: SDUPTHER

## 2018-02-02 LAB
BH CV ECHO MEAS - AO ISTHMUS: 2.4 CM
BH CV ECHO MEAS - AO MAX PG (FULL): 2.3 MMHG
BH CV ECHO MEAS - AO MAX PG: 5.1 MMHG
BH CV ECHO MEAS - AO MEAN PG (FULL): 1 MMHG
BH CV ECHO MEAS - AO MEAN PG: 3 MMHG
BH CV ECHO MEAS - AO V2 MAX: 113 CM/SEC
BH CV ECHO MEAS - AO V2 MEAN: 80.7 CM/SEC
BH CV ECHO MEAS - AO V2 VTI: 22.8 CM
BH CV ECHO MEAS - ASC AORTA: 3.2 CM
BH CV ECHO MEAS - AVA(I,A): 2.5 CM^2
BH CV ECHO MEAS - AVA(I,D): 2.5 CM^2
BH CV ECHO MEAS - AVA(V,A): 2.3 CM^2
BH CV ECHO MEAS - AVA(V,D): 2.3 CM^2
BH CV ECHO MEAS - BSA(HAYCOCK): 1.9 M^2
BH CV ECHO MEAS - BSA: 1.9 M^2
BH CV ECHO MEAS - BZI_BMI: 27.3 KILOGRAMS/M^2
BH CV ECHO MEAS - BZI_METRIC_HEIGHT: 170.2 CM
BH CV ECHO MEAS - BZI_METRIC_WEIGHT: 78.9 KG
BH CV ECHO MEAS - EDV(CUBED): 97.3 ML
BH CV ECHO MEAS - EDV(TEICH): 97.3 ML
BH CV ECHO MEAS - EF(CUBED): 69.4 %
BH CV ECHO MEAS - EF(TEICH): 61 %
BH CV ECHO MEAS - ESV(CUBED): 29.8 ML
BH CV ECHO MEAS - ESV(TEICH): 37.9 ML
BH CV ECHO MEAS - FS: 32.6 %
BH CV ECHO MEAS - IVS/LVPW: 0.85
BH CV ECHO MEAS - IVSD: 1.1 CM
BH CV ECHO MEAS - LA DIMENSION: 5.3 CM
BH CV ECHO MEAS - LV MASS(C)D: 205 GRAMS
BH CV ECHO MEAS - LV MASS(C)DI: 107.6 GRAMS/M^2
BH CV ECHO MEAS - LV MAX PG: 2.8 MMHG
BH CV ECHO MEAS - LV MEAN PG: 2 MMHG
BH CV ECHO MEAS - LV V1 MAX: 84.4 CM/SEC
BH CV ECHO MEAS - LV V1 MEAN: 60.8 CM/SEC
BH CV ECHO MEAS - LV V1 VTI: 18.2 CM
BH CV ECHO MEAS - LVIDD: 4.6 CM
BH CV ECHO MEAS - LVIDS: 3.1 CM
BH CV ECHO MEAS - LVOT AREA (M): 3.1 CM^2
BH CV ECHO MEAS - LVOT AREA: 3.1 CM^2
BH CV ECHO MEAS - LVOT DIAM: 2 CM
BH CV ECHO MEAS - LVPWD: 1.3 CM
BH CV ECHO MEAS - MR MAX PG: 96 MMHG
BH CV ECHO MEAS - MR MAX VEL: 490 CM/SEC
BH CV ECHO MEAS - MV DEC SLOPE: 574 CM/SEC^2
BH CV ECHO MEAS - MV E MAX VEL: 125 CM/SEC
BH CV ECHO MEAS - MV P1/2T MAX VEL: 142 CM/SEC
BH CV ECHO MEAS - MV P1/2T: 72.5 MSEC
BH CV ECHO MEAS - MVA P1/2T LCG: 1.5 CM^2
BH CV ECHO MEAS - MVA(P1/2T): 3 CM^2
BH CV ECHO MEAS - PA MAX PG: 2.4 MMHG
BH CV ECHO MEAS - PA MEAN PG: 1 MMHG
BH CV ECHO MEAS - PA V2 MAX: 77.9 CM/SEC
BH CV ECHO MEAS - PA V2 MEAN: 55.1 CM/SEC
BH CV ECHO MEAS - PA V2 VTI: 15.3 CM
BH CV ECHO MEAS - PI END-D VEL: 153 CM/SEC
BH CV ECHO MEAS - RAP SYSTOLE: 5 MMHG
BH CV ECHO MEAS - RVDD: 3.1 CM
BH CV ECHO MEAS - RVSP: 33.1 MMHG
BH CV ECHO MEAS - SI(CUBED): 35.4 ML/M^2
BH CV ECHO MEAS - SI(LVOT): 30 ML/M^2
BH CV ECHO MEAS - SI(TEICH): 31.2 ML/M^2
BH CV ECHO MEAS - SV(CUBED): 67.5 ML
BH CV ECHO MEAS - SV(LVOT): 57.2 ML
BH CV ECHO MEAS - SV(TEICH): 59.4 ML
BH CV ECHO MEAS - TR MAX VEL: 265 CM/SEC
LV EF 2D ECHO EST: 53 %

## 2018-02-14 RX ORDER — METOPROLOL SUCCINATE 50 MG/1
50 TABLET, EXTENDED RELEASE ORAL 2 TIMES DAILY
Qty: 180 TABLET | Refills: 3 | Status: SHIPPED | OUTPATIENT
Start: 2018-02-14 | End: 2018-02-14 | Stop reason: SDUPTHER

## 2018-02-14 RX ORDER — METOPROLOL SUCCINATE 50 MG/1
50 TABLET, EXTENDED RELEASE ORAL 2 TIMES DAILY
Qty: 180 TABLET | Refills: 2 | Status: SHIPPED | OUTPATIENT
Start: 2018-02-14 | End: 2019-05-11 | Stop reason: SDUPTHER

## 2018-02-14 NOTE — TELEPHONE ENCOUNTER
Refill Request came in by fax from   [facility] - [place], [place] - [address] [place] [address] [address] [address] AT [facility] [address] - [phone]  - [phone] [address]  [facility] [address]  Phone: [phone] Fax: [phone]   Refill sent to pharmacy via e-scribe.

## 2018-02-15 ENCOUNTER — ANTICOAGULATION VISIT (OUTPATIENT)
Dept: CARDIAC SURGERY | Facility: CLINIC | Age: 78
End: 2018-02-15

## 2018-02-15 DIAGNOSIS — Z79.01 LONG-TERM (CURRENT) USE OF ANTICOAGULANTS: ICD-10-CM

## 2018-02-15 DIAGNOSIS — I48.91 ATRIAL FIBRILLATION, UNSPECIFIED TYPE (HCC): ICD-10-CM

## 2018-02-15 LAB — INR PPP: 1.8

## 2018-02-15 NOTE — PROGRESS NOTES
Received above INR from [facility]. Spoke with pt who denies any med changes, bleeding issues, or s/s of blood clot. PT denies any missed doses or excessive k. Adjusted pt's dose and instructed to hold green vegs for 2 days. Will recheck INR on 2/22. Patient instructed regarding medication; results given and questions answered. Nutritional counseling given.  Dietary factors affecting therapy addressed.  Patient instructed to monitor for excessive bruising or bleeding. PT verbalizes understanding.         This document has been electronically signed by MICKIE Marx on February 15, 2018 3:31 PM

## 2018-02-23 ENCOUNTER — ANTICOAGULATION VISIT (OUTPATIENT)
Dept: CARDIAC SURGERY | Facility: CLINIC | Age: 78
End: 2018-02-23

## 2018-02-23 DIAGNOSIS — I48.91 ATRIAL FIBRILLATION, UNSPECIFIED TYPE (HCC): ICD-10-CM

## 2018-02-23 DIAGNOSIS — Z79.01 LONG-TERM (CURRENT) USE OF ANTICOAGULANTS: ICD-10-CM

## 2018-02-23 LAB — INR PPP: 3.1 (ref 0.9–1.1)

## 2018-02-23 NOTE — PROGRESS NOTES
Received pt's INR from [facility]. PT states she finished 7 days of Levaquin this am. PT instructed to always call CC with med changes. PT denies any bleeding issues or s/s of blood clot. PT instructed to continue same dose since she been on AB, but increase vit k today and Sunday. PT will recheck INR on Thursday, March 1. Patient instructed regarding medication; results given and questions answered. Nutritional counseling given.  Dietary factors affecting therapy addressed.  Patient instructed to monitor for excessive bruising or bleeding. PT verbalizes understanding.   Electronically signed by MICKIE Correa

## 2018-03-01 ENCOUNTER — ANTICOAGULATION VISIT (OUTPATIENT)
Dept: CARDIAC SURGERY | Facility: CLINIC | Age: 78
End: 2018-03-01

## 2018-03-01 DIAGNOSIS — Z79.01 LONG-TERM (CURRENT) USE OF ANTICOAGULANTS: ICD-10-CM

## 2018-03-01 DIAGNOSIS — I48.91 ATRIAL FIBRILLATION, UNSPECIFIED TYPE (HCC): ICD-10-CM

## 2018-03-01 LAB — INR PPP: 3.1

## 2018-03-01 NOTE — PROGRESS NOTES
Pt called in to report INR from home meter. Pt denies med changes or bleeding problems. Pt was instructed to continue current dose and have a salad today and Sunday or Monday; pt repeated back correctly. Pt was instructed to recheck INR next Thursday March 8; she verbalized an understanding.         This document has been electronically signed by MICKIE Marx on March 1, 2018 2:58 PM

## 2018-03-08 ENCOUNTER — ANTICOAGULATION VISIT (OUTPATIENT)
Dept: CARDIAC SURGERY | Facility: CLINIC | Age: 78
End: 2018-03-08

## 2018-03-08 DIAGNOSIS — I48.91 ATRIAL FIBRILLATION, UNSPECIFIED TYPE (HCC): ICD-10-CM

## 2018-03-08 DIAGNOSIS — Z79.01 LONG-TERM (CURRENT) USE OF ANTICOAGULANTS: ICD-10-CM

## 2018-03-08 LAB — INR PPP: 2.7 (ref 0.9–1.1)

## 2018-03-08 PROCEDURE — 85610 PROTHROMBIN TIME: CPT | Performed by: NURSE PRACTITIONER

## 2018-03-08 NOTE — PROGRESS NOTES
Received above INR from [facility]. PT states she ate green 2-3x since last INR. Denies any new medications or bleeding issues. PT instructed to continue same dose and consume green 1-2x per week. PT will recheck INR on 3/22 and inform CC. Patient instructed regarding medication; results given and questions answered. Nutritional counseling given.  Dietary factors affecting therapy addressed.  Patient instructed to monitor for excessive bruising or bleeding. PT verbalizes understanding.   Electronically signed by MICKIE Correa

## 2018-03-16 ENCOUNTER — HOSPITAL ENCOUNTER (OUTPATIENT)
Facility: HOSPITAL | Age: 78
Setting detail: HOSPITAL OUTPATIENT SURGERY
Discharge: HOME | End: 2018-03-16
Attending: OPHTHALMOLOGY | Admitting: OPHTHALMOLOGY

## 2018-03-16 ENCOUNTER — ANESTHESIA EVENT (OUTPATIENT)
Dept: PERIOP | Facility: HOSPITAL | Age: 78
End: 2018-03-16

## 2018-03-16 ENCOUNTER — ANESTHESIA (OUTPATIENT)
Dept: PERIOP | Facility: HOSPITAL | Age: 78
End: 2018-03-16

## 2018-03-16 VITALS
RESPIRATION RATE: 18 BRPM | BODY MASS INDEX: 26.73 KG/M2 | TEMPERATURE: 97.7 F | DIASTOLIC BLOOD PRESSURE: 60 MMHG | SYSTOLIC BLOOD PRESSURE: 115 MMHG | HEART RATE: 77 BPM | HEIGHT: 68 IN | OXYGEN SATURATION: 94 % | WEIGHT: 176.37 LBS

## 2018-03-16 LAB — GLUCOSE BLDC GLUCOMTR-MCNC: 117 MG/DL (ref 70–130)

## 2018-03-16 PROCEDURE — 25010000002 FENTANYL CITRATE (PF) 100 MCG/2ML SOLUTION: Performed by: NURSE ANESTHETIST, CERTIFIED REGISTERED

## 2018-03-16 PROCEDURE — V2632 POST CHMBR INTRAOCULAR LENS: HCPCS | Performed by: OPHTHALMOLOGY

## 2018-03-16 PROCEDURE — 25010000002 VANCOMYCIN PER 500 MG: Performed by: OPHTHALMOLOGY

## 2018-03-16 PROCEDURE — 25010000002 MIDAZOLAM PER 1 MG: Performed by: NURSE ANESTHETIST, CERTIFIED REGISTERED

## 2018-03-16 PROCEDURE — 82962 GLUCOSE BLOOD TEST: CPT

## 2018-03-16 PROCEDURE — 25010000002 EPINEPHRINE PER 0.1 MG: Performed by: OPHTHALMOLOGY

## 2018-03-16 DEVICE — LENS ACRYSOF IQ 6X13MM SN60WF 22.5: Type: IMPLANTABLE DEVICE | Site: EYE | Status: FUNCTIONAL

## 2018-03-16 RX ORDER — PHENYLEPHRINE HCL 2.5 %
1 DROPS OPHTHALMIC (EYE)
Status: COMPLETED | OUTPATIENT
Start: 2018-03-16 | End: 2018-03-16

## 2018-03-16 RX ORDER — MIDAZOLAM HYDROCHLORIDE 1 MG/ML
INJECTION INTRAMUSCULAR; INTRAVENOUS AS NEEDED
Status: DISCONTINUED | OUTPATIENT
Start: 2018-03-16 | End: 2018-03-16 | Stop reason: SURG

## 2018-03-16 RX ORDER — SODIUM CHLORIDE 0.9 % (FLUSH) 0.9 %
3 SYRINGE (ML) INJECTION AS NEEDED
Status: DISCONTINUED | OUTPATIENT
Start: 2018-03-16 | End: 2018-03-16 | Stop reason: HOSPADM

## 2018-03-16 RX ORDER — SODIUM CHLORIDE 0.9 % (FLUSH) 0.9 %
10 SYRINGE (ML) INJECTION AS NEEDED
Status: DISCONTINUED | OUTPATIENT
Start: 2018-03-16 | End: 2018-03-16 | Stop reason: HOSPADM

## 2018-03-16 RX ORDER — SODIUM CHLORIDE, SODIUM GLUCONATE, SODIUM ACETATE, POTASSIUM CHLORIDE, AND MAGNESIUM CHLORIDE 526; 502; 368; 37; 30 MG/100ML; MG/100ML; MG/100ML; MG/100ML; MG/100ML
1000 INJECTION, SOLUTION INTRAVENOUS CONTINUOUS PRN
Status: DISCONTINUED | OUTPATIENT
Start: 2018-03-16 | End: 2018-03-16 | Stop reason: HOSPADM

## 2018-03-16 RX ORDER — LIDOCAINE HYDROCHLORIDE 10 MG/ML
0.5 INJECTION, SOLUTION INFILTRATION; PERINEURAL ONCE AS NEEDED
Status: DISCONTINUED | OUTPATIENT
Start: 2018-03-16 | End: 2018-03-16 | Stop reason: HOSPADM

## 2018-03-16 RX ORDER — FENTANYL CITRATE 50 UG/ML
INJECTION, SOLUTION INTRAMUSCULAR; INTRAVENOUS AS NEEDED
Status: DISCONTINUED | OUTPATIENT
Start: 2018-03-16 | End: 2018-03-16 | Stop reason: SURG

## 2018-03-16 RX ORDER — CYCLOPENTOLATE HYDROCHLORIDE 10 MG/ML
1 SOLUTION/ DROPS OPHTHALMIC
Status: COMPLETED | OUTPATIENT
Start: 2018-03-16 | End: 2018-03-16

## 2018-03-16 RX ORDER — TETRACAINE HYDROCHLORIDE 5 MG/ML
1 SOLUTION OPHTHALMIC AS NEEDED
Status: COMPLETED | OUTPATIENT
Start: 2018-03-16 | End: 2018-03-16

## 2018-03-16 RX ORDER — MOXIFLOXACIN 5 MG/ML
SOLUTION/ DROPS OPHTHALMIC AS NEEDED
Status: DISCONTINUED | OUTPATIENT
Start: 2018-03-16 | End: 2018-03-16 | Stop reason: HOSPADM

## 2018-03-16 RX ORDER — TETRACAINE HYDROCHLORIDE 5 MG/ML
SOLUTION OPHTHALMIC AS NEEDED
Status: DISCONTINUED | OUTPATIENT
Start: 2018-03-16 | End: 2018-03-16 | Stop reason: HOSPADM

## 2018-03-16 RX ORDER — BRIMONIDINE TARTRATE 0.15 %
DROPS OPHTHALMIC (EYE) AS NEEDED
Status: DISCONTINUED | OUTPATIENT
Start: 2018-03-16 | End: 2018-03-16 | Stop reason: HOSPADM

## 2018-03-16 RX ORDER — PREDNISOLONE ACETATE 10 MG/ML
SUSPENSION/ DROPS OPHTHALMIC AS NEEDED
Status: DISCONTINUED | OUTPATIENT
Start: 2018-03-16 | End: 2018-03-16 | Stop reason: HOSPADM

## 2018-03-16 RX ADMIN — CYCLOPENTOLATE HYDROCHLORIDE 1 DROP: 10 SOLUTION/ DROPS OPHTHALMIC at 10:15

## 2018-03-16 RX ADMIN — TETRACAINE HYDROCHLORIDE 1 DROP: 5 SOLUTION OPHTHALMIC at 10:15

## 2018-03-16 RX ADMIN — CYCLOPENTOLATE HYDROCHLORIDE 1 DROP: 10 SOLUTION/ DROPS OPHTHALMIC at 09:55

## 2018-03-16 RX ADMIN — Medication 10 ML: at 10:06

## 2018-03-16 RX ADMIN — PHENYLEPHRINE HYDROCHLORIDE 1 DROP: 25 SOLUTION/ DROPS OPHTHALMIC at 09:55

## 2018-03-16 RX ADMIN — PHENYLEPHRINE HYDROCHLORIDE 1 DROP: 25 SOLUTION/ DROPS OPHTHALMIC at 10:05

## 2018-03-16 RX ADMIN — PHENYLEPHRINE HYDROCHLORIDE 1 DROP: 25 SOLUTION/ DROPS OPHTHALMIC at 10:15

## 2018-03-16 RX ADMIN — MIDAZOLAM 1 MG: 1 INJECTION INTRAMUSCULAR; INTRAVENOUS at 11:22

## 2018-03-16 RX ADMIN — FENTANYL CITRATE 50 MCG: 50 INJECTION, SOLUTION INTRAMUSCULAR; INTRAVENOUS at 11:22

## 2018-03-16 RX ADMIN — TETRACAINE HYDROCHLORIDE 1 DROP: 5 SOLUTION OPHTHALMIC at 09:55

## 2018-03-16 RX ADMIN — CYCLOPENTOLATE HYDROCHLORIDE 1 DROP: 10 SOLUTION/ DROPS OPHTHALMIC at 10:05

## 2018-03-16 NOTE — ANESTHESIA PREPROCEDURE EVALUATION
Anesthesia Evaluation     no history of anesthetic complications:  NPO Solid Status: > 8 hours  NPO Liquid Status: > 2 hours           Airway   Mallampati: III  TM distance: >3 FB  Neck ROM: full  Possible difficult intubation  Dental    (+) lower dentures and upper dentures    Pulmonary - normal exam    breath sounds clear to auscultation  (+) COPD,   Cardiovascular - normal exam  Exercise tolerance: poor (<4 METS)    PT is on anticoagulation therapy  Patient on routine beta blocker and Beta blocker given within 24 hours of surgery  Rhythm: regular  Rate: normal    (+) hypertension, CAD, cardiac stents more than 12 months ago dysrhythmias Atrial Fib, hyperlipidemia,   (-) angina, murmur    ROS comment: · Left ventricular wall thickness is consistent with mild concentric hypertrophy.  · Left ventricular systolic function is normal. Estimated EF = 53%.  · Right ventricular cavity is borderline dilated.  · Left atrial cavity size is severely dilated.  · Mild mitral valve regurgitation is present  · Mild tricuspid valve regurgitation is present.  · There is a moderate (1-2cm) pericardial effusion.  · No echocardiographic evidence of tamponade    Neuro/Psych- negative ROS  GI/Hepatic/Renal/Endo    (+)   diabetes mellitus type 2 well controlled, hypothyroidism,     Musculoskeletal (-) negative ROS    Abdominal  - normal exam   Substance History - negative use     OB/GYN          Other - negative ROS                       Anesthesia Plan    ASA 3     MAC     intravenous induction   Anesthetic plan and risks discussed with patient.

## 2018-03-16 NOTE — ANESTHESIA POSTPROCEDURE EVALUATION
Patient: Kirsten Rosales    Procedure Summary     Date:  03/16/18 Room / Location:  [facility] OR 06 / [facility] OR    Anesthesia Start:  1121 Anesthesia Stop:  1140    Procedure:  CATARACT PHACO EXTRACTION WITH INTRAOCULAR LENS IMPLANT (Right Eye) Diagnosis:       Age-related nuclear cataract of right eye      (Age-related nuclear cataract of right eye [H25.11])    Surgeon:  Umesh Thibodeaux MD Provider:  Monster Oates MD    Anesthesia Type:  MAC ASA Status:  3          Anesthesia Type: MAC  Last vitals  BP   131/80 (03/16/18 0959)   Temp   97 °F (36.1 °C) (03/16/18 0959)   Pulse   89 (03/16/18 0959)   Resp   18 (03/16/18 0959)     SpO2   94 % (03/16/18 0959)     Post Anesthesia Care and Evaluation    Patient location during evaluation: bedside  Patient participation: complete - patient participated  Level of consciousness: awake and alert  Pain score: 0  Pain management: adequate  Airway patency: patent  Anesthetic complications: No anesthetic complications  PONV Status: none  Cardiovascular status: acceptable  Respiratory status: acceptable  Hydration status: acceptable

## 2018-03-22 ENCOUNTER — ANTICOAGULATION VISIT (OUTPATIENT)
Dept: CARDIAC SURGERY | Facility: CLINIC | Age: 78
End: 2018-03-22

## 2018-03-22 DIAGNOSIS — Z79.01 LONG-TERM (CURRENT) USE OF ANTICOAGULANTS: ICD-10-CM

## 2018-03-22 DIAGNOSIS — I48.91 ATRIAL FIBRILLATION, UNSPECIFIED TYPE (HCC): ICD-10-CM

## 2018-03-22 LAB — INR PPP: 2.5

## 2018-03-22 NOTE — PROGRESS NOTES
Received above INR from pt's home meter. PT denies any new medications or bleeding issues. PT denies any s/s of blood clot. PT instructed to continue same dose and recheck INR on 4/12. Patient instructed regarding medication; results given and questions answered. Nutritional counseling given.  Dietary factors affecting therapy addressed.  Patient instructed to monitor for excessive bruising or bleeding. PT verbalizes understanding.   Electronically signed by MICKIE Correa

## 2018-03-23 ENCOUNTER — HOSPITAL ENCOUNTER (OUTPATIENT)
Facility: HOSPITAL | Age: 78
Setting detail: HOSPITAL OUTPATIENT SURGERY
Discharge: HOME | End: 2018-03-23
Attending: OPHTHALMOLOGY | Admitting: OPHTHALMOLOGY

## 2018-03-23 ENCOUNTER — ANESTHESIA (OUTPATIENT)
Dept: PERIOP | Facility: HOSPITAL | Age: 78
End: 2018-03-23

## 2018-03-23 ENCOUNTER — ANESTHESIA EVENT (OUTPATIENT)
Dept: PERIOP | Facility: HOSPITAL | Age: 78
End: 2018-03-23

## 2018-03-23 VITALS
HEIGHT: 68 IN | RESPIRATION RATE: 18 BRPM | BODY MASS INDEX: 26.2 KG/M2 | SYSTOLIC BLOOD PRESSURE: 108 MMHG | HEART RATE: 64 BPM | OXYGEN SATURATION: 96 % | DIASTOLIC BLOOD PRESSURE: 66 MMHG | TEMPERATURE: 97.6 F | WEIGHT: 172.84 LBS

## 2018-03-23 LAB — GLUCOSE BLDC GLUCOMTR-MCNC: 111 MG/DL (ref 70–130)

## 2018-03-23 PROCEDURE — 25010000002 MIDAZOLAM PER 1 MG: Performed by: NURSE ANESTHETIST, CERTIFIED REGISTERED

## 2018-03-23 PROCEDURE — 82962 GLUCOSE BLOOD TEST: CPT

## 2018-03-23 PROCEDURE — V2632 POST CHMBR INTRAOCULAR LENS: HCPCS | Performed by: OPHTHALMOLOGY

## 2018-03-23 PROCEDURE — 25010000002 FENTANYL CITRATE (PF) 100 MCG/2ML SOLUTION: Performed by: NURSE ANESTHETIST, CERTIFIED REGISTERED

## 2018-03-23 PROCEDURE — 25010000002 VANCOMYCIN PER 500 MG: Performed by: OPHTHALMOLOGY

## 2018-03-23 PROCEDURE — 25010000002 EPINEPHRINE PER 0.1 MG: Performed by: OPHTHALMOLOGY

## 2018-03-23 DEVICE — LENS ACRYSOF IQ 6X13MM SN60WF 22.5: Type: IMPLANTABLE DEVICE | Site: EYE | Status: FUNCTIONAL

## 2018-03-23 RX ORDER — BALANCED SALT SOLUTION ENRICHED WITH BICARBONATE, DEXTROSE, AND GLUTATHIONE
KIT INTRAOCULAR AS NEEDED
Status: DISCONTINUED | OUTPATIENT
Start: 2018-03-23 | End: 2018-03-23 | Stop reason: HOSPADM

## 2018-03-23 RX ORDER — BRIMONIDINE TARTRATE 0.15 %
DROPS OPHTHALMIC (EYE) AS NEEDED
Status: DISCONTINUED | OUTPATIENT
Start: 2018-03-23 | End: 2018-03-23 | Stop reason: HOSPADM

## 2018-03-23 RX ORDER — CYCLOPENTOLATE HYDROCHLORIDE 10 MG/ML
1 SOLUTION/ DROPS OPHTHALMIC
Status: COMPLETED | OUTPATIENT
Start: 2018-03-23 | End: 2018-03-23

## 2018-03-23 RX ORDER — TETRACAINE HYDROCHLORIDE 5 MG/ML
1 SOLUTION OPHTHALMIC
Status: COMPLETED | OUTPATIENT
Start: 2018-03-23 | End: 2018-03-23

## 2018-03-23 RX ORDER — PHENYLEPHRINE HCL 2.5 %
1 DROPS OPHTHALMIC (EYE)
Status: COMPLETED | OUTPATIENT
Start: 2018-03-23 | End: 2018-03-23

## 2018-03-23 RX ORDER — TETRACAINE HYDROCHLORIDE 5 MG/ML
SOLUTION OPHTHALMIC AS NEEDED
Status: DISCONTINUED | OUTPATIENT
Start: 2018-03-23 | End: 2018-03-23 | Stop reason: HOSPADM

## 2018-03-23 RX ORDER — PREDNISOLONE ACETATE 10 MG/ML
SUSPENSION/ DROPS OPHTHALMIC AS NEEDED
Status: DISCONTINUED | OUTPATIENT
Start: 2018-03-23 | End: 2018-03-23 | Stop reason: HOSPADM

## 2018-03-23 RX ORDER — MIDAZOLAM HYDROCHLORIDE 1 MG/ML
INJECTION INTRAMUSCULAR; INTRAVENOUS AS NEEDED
Status: DISCONTINUED | OUTPATIENT
Start: 2018-03-23 | End: 2018-03-23 | Stop reason: SURG

## 2018-03-23 RX ORDER — MOXIFLOXACIN 5 MG/ML
SOLUTION/ DROPS OPHTHALMIC AS NEEDED
Status: DISCONTINUED | OUTPATIENT
Start: 2018-03-23 | End: 2018-03-23 | Stop reason: HOSPADM

## 2018-03-23 RX ORDER — FENTANYL CITRATE 50 UG/ML
INJECTION, SOLUTION INTRAMUSCULAR; INTRAVENOUS AS NEEDED
Status: DISCONTINUED | OUTPATIENT
Start: 2018-03-23 | End: 2018-03-23 | Stop reason: SURG

## 2018-03-23 RX ORDER — SODIUM CHLORIDE 0.9 % (FLUSH) 0.9 %
10 SYRINGE (ML) INJECTION AS NEEDED
Status: DISCONTINUED | OUTPATIENT
Start: 2018-03-23 | End: 2018-03-23 | Stop reason: HOSPADM

## 2018-03-23 RX ADMIN — PHENYLEPHRINE HYDROCHLORIDE 1 DROP: 25 SOLUTION/ DROPS OPHTHALMIC at 09:16

## 2018-03-23 RX ADMIN — CYCLOPENTOLATE HYDROCHLORIDE 1 DROP: 10 SOLUTION/ DROPS OPHTHALMIC at 09:16

## 2018-03-23 RX ADMIN — PHENYLEPHRINE HYDROCHLORIDE 1 DROP: 25 SOLUTION/ DROPS OPHTHALMIC at 09:06

## 2018-03-23 RX ADMIN — SODIUM CHLORIDE, PRESERVATIVE FREE 10 ML: 5 INJECTION INTRAVENOUS at 09:12

## 2018-03-23 RX ADMIN — TETRACAINE HYDROCHLORIDE 1 DROP: 5 SOLUTION OPHTHALMIC at 08:56

## 2018-03-23 RX ADMIN — CYCLOPENTOLATE HYDROCHLORIDE 1 DROP: 10 SOLUTION/ DROPS OPHTHALMIC at 09:06

## 2018-03-23 RX ADMIN — CYCLOPENTOLATE HYDROCHLORIDE 1 DROP: 10 SOLUTION/ DROPS OPHTHALMIC at 08:56

## 2018-03-23 RX ADMIN — PHENYLEPHRINE HYDROCHLORIDE 1 DROP: 25 SOLUTION/ DROPS OPHTHALMIC at 08:56

## 2018-03-23 RX ADMIN — FENTANYL CITRATE 50 MCG: 50 INJECTION, SOLUTION INTRAMUSCULAR; INTRAVENOUS at 10:49

## 2018-03-23 RX ADMIN — TETRACAINE HYDROCHLORIDE 1 DROP: 5 SOLUTION OPHTHALMIC at 09:16

## 2018-03-23 RX ADMIN — MIDAZOLAM 1 MG: 1 INJECTION INTRAMUSCULAR; INTRAVENOUS at 10:49

## 2018-03-23 NOTE — ANESTHESIA POSTPROCEDURE EVALUATION
Patient: Kirsten Rosales    Procedure Summary     Date:  03/23/18 Room / Location:  [facility] OR 06 / [facility] OR    Anesthesia Start:  1047 Anesthesia Stop:  1102    Procedure:  CATARACT PHACO EXTRACTION WITH INTRAOCULAR LENS IMPLANT (Left Eye) Diagnosis:       Age-related nuclear cataract of left eye      (Age-related nuclear cataract of left eye [H25.12])    Surgeon:  Umesh Thibodeaux MD Provider:  Nadeem Walters MD    Anesthesia Type:  MAC ASA Status:  3          Anesthesia Type: MAC  Last vitals  BP   132/66 (03/23/18 0858)   Temp   97.1 °F (36.2 °C) (03/23/18 0858)   Pulse   74 (03/23/18 0858)   Resp   18 (03/23/18 0858)     SpO2   94 % (03/23/18 0858)     Post Anesthesia Care and Evaluation    Patient location during evaluation: bedside  Patient participation: complete - patient participated  Level of consciousness: awake and alert  Pain management: adequate  Airway patency: patent  Anesthetic complications: No anesthetic complications  PONV Status: none  Cardiovascular status: acceptable  Respiratory status: acceptable  Hydration status: acceptable

## 2018-04-11 ENCOUNTER — CLINICAL SUPPORT (OUTPATIENT)
Dept: CARDIOLOGY | Facility: CLINIC | Age: 78
End: 2018-04-11

## 2018-04-11 DIAGNOSIS — I49.5 SSS (SICK SINUS SYNDROME) (HCC): Primary | ICD-10-CM

## 2018-04-11 DIAGNOSIS — Z95.0 PACEMAKER: ICD-10-CM

## 2018-04-11 PROCEDURE — 93288 INTERROG EVL PM/LDLS PM IP: CPT | Performed by: NURSE PRACTITIONER

## 2018-04-11 NOTE — PROGRESS NOTES
Pacemaker Evaluation Report    April 11, 2018    Primary Cardiologist: Dr. Alejandro  Implanting MD: Dr. ruth  :[employer] Model: [employer] [address] RD [id number] Serial Number: [id number]  Implant date: 02/12/2014    Reason for evaluation:routine  Cardiac device indication(s): sick sinus syndrome    Battery  REINA: 5.2-7 years, 2.9 v     Interrogation Results  Atrial sensing: P wave: 2.4 mV  Atrial capture: Flutter  Atrial lead impedance: 300 ohms  Ventricular sensing: R wave: 6.9 mV  Ventricular capture: 0.5 V @ 0.5 ms  Ventricular lead impedance: right  600 ohms    Parameters  Mode: DDDR  Base Rate:     Diagnostic Data  Atrial paced: Less than 1% % Ventricular paced: 55% %  Mode switch: 100% %  AT/AF [place]:   AHR:   VHR:    Changes made:  upgrade    Conclusions: normal device function and Follow up in 6 months    Assessment:  1. SSS (sick sinus syndrome)    2. Pacemaker            This document has been electronically signed by MICKIE Hogan on April 11, 2018 3:09 PM

## 2018-04-13 ENCOUNTER — ANTICOAGULATION VISIT (OUTPATIENT)
Dept: CARDIAC SURGERY | Facility: CLINIC | Age: 78
End: 2018-04-13

## 2018-04-13 DIAGNOSIS — Z79.01 LONG-TERM (CURRENT) USE OF ANTICOAGULANTS: ICD-10-CM

## 2018-04-13 DIAGNOSIS — I48.91 ATRIAL FIBRILLATION, UNSPECIFIED TYPE (HCC): ICD-10-CM

## 2018-04-13 LAB — INR PPP: 2.7

## 2018-04-13 NOTE — PROGRESS NOTES
Received above INR from pt's home meter. Spoke with pt who denies any new medications or bleeding issues. PT denies any s/s of blood clot. Instructed pt to continue same dose and Coumadin friendly diet. Recheck INR on 5/10. Patient instructed regarding medication; results given and questions answered. Nutritional counseling given.  Dietary factors affecting therapy addressed.  Patient instructed to monitor for excessive bruising or bleeding. PT repeated back correctly.           This document has been electronically signed by MICKIE Marx on April 13, 2018 2:31 PM

## 2018-05-11 ENCOUNTER — ANTICOAGULATION VISIT (OUTPATIENT)
Dept: CARDIAC SURGERY | Facility: CLINIC | Age: 78
End: 2018-05-11

## 2018-05-11 DIAGNOSIS — I48.91 ATRIAL FIBRILLATION, UNSPECIFIED TYPE (HCC): ICD-10-CM

## 2018-05-11 DIAGNOSIS — Z79.01 LONG-TERM (CURRENT) USE OF ANTICOAGULANTS: ICD-10-CM

## 2018-05-11 LAB — INR PPP: 3.5

## 2018-05-11 NOTE — PROGRESS NOTES
Received above INR from pt's [facility]. Spoke with pt over the phone who denies any new medications, bleeding issues, diet changes. Adjusted pt's dose and instructed to increase vit k today. PT instructed to recheck INR on 5/17. Patient instructed regarding medication; results given and questions answered. Nutritional counseling given.  Dietary factors affecting therapy addressed.  Patient instructed to monitor for excessive bruising or bleeding. PT verbalizes understanding.           This document has been electronically signed by MICKIE Marx on May 11, 2018 1:43 PM

## 2018-05-17 ENCOUNTER — ANTICOAGULATION VISIT (OUTPATIENT)
Dept: CARDIAC SURGERY | Facility: CLINIC | Age: 78
End: 2018-05-17

## 2018-05-17 DIAGNOSIS — I48.91 ATRIAL FIBRILLATION, UNSPECIFIED TYPE (HCC): ICD-10-CM

## 2018-05-17 DIAGNOSIS — Z79.01 LONG-TERM (CURRENT) USE OF ANTICOAGULANTS: ICD-10-CM

## 2018-05-17 LAB — INR PPP: 3.3

## 2018-05-17 NOTE — PROGRESS NOTES
Pt called CC today with self-test INR.  Pt denies med changes or bleeding issues.  Unable to determine cause for continued elevation.  Adjusted coumadin dose and instructed pt to increase Vit K intake today with a broccoli serving.  Instructed pt to self-test again on Thursday, May 24th.  Pt verbalizes.  Patient instructed regarding medication; results given and questions answered. Nutritional counseling given.  Dietary factors affecting therapy addressed.  Patient instructed to monitor for excessive bruising or bleeding.  Electronically signed by MICKIE Correa

## 2018-05-24 ENCOUNTER — ANTICOAGULATION VISIT (OUTPATIENT)
Dept: CARDIAC SURGERY | Facility: CLINIC | Age: 78
End: 2018-05-24

## 2018-05-24 DIAGNOSIS — Z79.01 LONG-TERM (CURRENT) USE OF ANTICOAGULANTS: ICD-10-CM

## 2018-05-24 DIAGNOSIS — I48.91 ATRIAL FIBRILLATION, UNSPECIFIED TYPE (HCC): ICD-10-CM

## 2018-05-24 LAB — INR PPP: 2.4

## 2018-05-24 NOTE — PROGRESS NOTES
Spoke to pt over the phone who self-tested today.  Pt denies med changes or bleeding issues.  Verified coumadin dose and instructed to continue the same.  Instructed pt to self-test again on Thursday, June 7th.  Pt verbalizes.  Patient instructed regarding medication; results given and questions answered. Nutritional counseling given.  Dietary factors affecting therapy addressed.  Patient instructed to monitor for excessive bruising or bleeding.          This document has been electronically signed by MICKIE Marx on May 24, 2018 4:21 PM

## 2018-05-29 RX ORDER — ISOSORBIDE MONONITRATE 30 MG/1
TABLET, EXTENDED RELEASE ORAL
Qty: 30 TABLET | Refills: 3 | Status: SHIPPED | OUTPATIENT
Start: 2018-05-29 | End: 2018-09-27 | Stop reason: SDUPTHER

## 2018-06-14 ENCOUNTER — ANTICOAGULATION VISIT (OUTPATIENT)
Dept: CARDIAC SURGERY | Facility: CLINIC | Age: 78
End: 2018-06-14

## 2018-06-14 DIAGNOSIS — I48.91 ATRIAL FIBRILLATION, UNSPECIFIED TYPE (HCC): ICD-10-CM

## 2018-06-14 DIAGNOSIS — Z79.01 LONG-TERM (CURRENT) USE OF ANTICOAGULANTS: ICD-10-CM

## 2018-06-14 LAB — INR PPP: 1.9 (ref 0.9–1.1)

## 2018-06-14 PROCEDURE — 99211 OFF/OP EST MAY X REQ PHY/QHP: CPT | Performed by: NURSE PRACTITIONER

## 2018-06-14 PROCEDURE — 85610 PROTHROMBIN TIME: CPT | Performed by: NURSE PRACTITIONER

## 2018-06-14 NOTE — PROGRESS NOTES
PT is overdue for INR. PT states she continued same dose when missing appt. Denies any new medications or bleeding issues. Denies any s/s of blood clot.PT denies any missed doses or excessive k. Adjusted pt's dose and instructed to hold green vegs for 2 days. Recheck INR Thursday, 6/21. Patient instructed regarding medication; results given and questions answered. Nutritional counseling given.  Dietary factors affecting therapy addressed.  Patient instructed to monitor for excessive bruising or bleeding. PT verbalizes understanding.           This document has been electronically signed by MICKIE Marx on June 14, 2018 9:10 AM

## 2018-06-22 ENCOUNTER — ANTICOAGULATION VISIT (OUTPATIENT)
Dept: CARDIAC SURGERY | Facility: CLINIC | Age: 78
End: 2018-06-22

## 2018-06-22 DIAGNOSIS — Z79.01 LONG-TERM (CURRENT) USE OF ANTICOAGULANTS: ICD-10-CM

## 2018-06-22 DIAGNOSIS — I48.91 ATRIAL FIBRILLATION, UNSPECIFIED TYPE (HCC): ICD-10-CM

## 2018-06-22 LAB — INR PPP: 2.3

## 2018-06-22 NOTE — PROGRESS NOTES
PT's INR was obtained from pt's [facility] home meter. Spoke with pt who denies any new medications or bleeding issues. Denies any s/s of blood clot. PT instructed to continue same dose and Coumadin friendly diet. PT will recheck INR on 7/5. Patient instructed regarding medication; results given and questions answered. Nutritional counseling given.  Dietary factors affecting therapy addressed.  Patient instructed to monitor for excessive bruising or bleeding. PT verbalizes understanding.         This document has been electronically signed by MARICARMEN Moser @ on June 22, 2018 9:50 AM

## 2018-07-03 ENCOUNTER — OFFICE VISIT (OUTPATIENT)
Dept: CARDIOLOGY | Facility: CLINIC | Age: 78
End: 2018-07-03

## 2018-07-03 VITALS
SYSTOLIC BLOOD PRESSURE: 130 MMHG | HEART RATE: 75 BPM | HEIGHT: 68 IN | DIASTOLIC BLOOD PRESSURE: 62 MMHG | BODY MASS INDEX: 26.22 KG/M2 | WEIGHT: 173 LBS

## 2018-07-03 DIAGNOSIS — I49.5 SSS (SICK SINUS SYNDROME) (HCC): Primary | ICD-10-CM

## 2018-07-03 DIAGNOSIS — E78.2 MIXED HYPERLIPIDEMIA: ICD-10-CM

## 2018-07-03 DIAGNOSIS — I10 ESSENTIAL HYPERTENSION: ICD-10-CM

## 2018-07-03 DIAGNOSIS — I48.91 ATRIAL FIBRILLATION, UNSPECIFIED TYPE (HCC): ICD-10-CM

## 2018-07-03 PROCEDURE — 99214 OFFICE O/P EST MOD 30 MIN: CPT | Performed by: INTERNAL MEDICINE

## 2018-07-03 NOTE — PROGRESS NOTES
[facility] Cardiology  OFFICE NOTE    Kirsten Rosales  78 y.o. female    07/03/2018  1. SSS (sick sinus syndrome) (CMS/HCC)    2. Essential hypertension    3. Mixed hyperlipidemia    4. Atrial fibrillation, unspecified type (CMS/HCC)        Chief complaint -Follow-up CAD      History of present Illness- 6-year-old lady with history of coronary disease prior stent placement in the chronic atrial fibrillation with sick sinus syndrome on warfarin for anticoagulation.  She is doing well no chest pain or shortness of breath she used to exercise regularly she hasn't done it for about a month.  Her most recent A1c was 6.5 in April 2018.  We'll do all the other blood work soon and she comes fasting.  She denies any GI symptoms or CNS symptoms              Allergies   Allergen Reactions   • Aspirin Hives   • Celebrex [Celecoxib] Hives   • Rythmol [Propafenone] Nausea And Vomiting         Past Medical History:   Diagnosis Date   • Atherosclerotic heart disease of native coronary artery with unspecified angina pectoris (CMS/HCC)    • Atrial fibrillation (CMS/HCC)    • Breath shortness    • CAD (coronary artery disease)    • Hyperlipidemia    • Long term (current) use of anticoagulants    • Sick sinus syndrome (CMS/HCC)    • Unspecified hypothyroidism          Past Surgical History:   Procedure Laterality Date   • BACK SURGERY     • CARDIAC CATHETERIZATION      2/2016   • CARDIAC PACEMAKER PLACEMENT     • CARDIOVERSION     • CATARACT EXTRACTION W/ INTRAOCULAR LENS IMPLANT Right 3/16/2018    Procedure: CATARACT PHACO EXTRACTION WITH INTRAOCULAR LENS IMPLANT;  Surgeon: Umesh Thibodeaux MD;  Location: [facility] OR;  Service: Ophthalmology   • CATARACT EXTRACTION W/ INTRAOCULAR LENS IMPLANT Left 3/23/2018    Procedure: CATARACT PHACO EXTRACTION WITH INTRAOCULAR LENS IMPLANT;  Surgeon: Umesh Thibodeaux MD;  Location: [facility];  Service: Ophthalmology   • COLONOSCOPY N/A 1/4/2018    Procedure:  COLONOSCOPY;  Surgeon: Albino Juan DO;  Location: [facility] ENDOSCOPY;  Service:    • CORONARY ANGIOPLASTY     • PACEMAKER IMPLANTATION           History reviewed. No pertinent family history.      Social History     Social History   • Marital status:      Spouse name: N/A   • Number of children: N/A   • Years of education: N/A     Occupational History   • Not on file.     Social History Main Topics   • Smoking status: Former Smoker   • Smokeless tobacco: Never Used      Comment: man 30 years ago   • Alcohol use No   • Drug use: No   • Sexual activity: Defer     Other Topics Concern   • Not on file     Social History Narrative   • No narrative on file         Current Outpatient Prescriptions   Medication Sig Dispense Refill   • docusate sodium (COLACE) 100 MG capsule Take 100 mg by mouth 2 (Two) Times a Day.     • isosorbide mononitrate (IMDUR) 30 MG 24 hr tablet TAKE ONE TABLET BY MOUTH DAILY 30 tablet 3   • levothyroxine (SYNTHROID, LEVOTHROID) 112 MCG tablet Take 112 mcg by mouth Daily.     • Melatonin 10 MG tablet Take 20 mg by mouth Every Night.     • metFORMIN (GLUCOPHAGE) 500 MG tablet Take 500 mg by mouth 2 (Two) Times a Day.     • metoprolol succinate XL (TOPROL-XL) 50 MG 24 hr tablet Take 1 tablet by mouth 2 (Two) Times a Day. 180 tablet 2   • mirtazapine (REMERON) 7.5 MG half tablet Take 7.5 mg by mouth Every Night.     • Omega-3 Fatty Acids (OMEGA 3 PO) Take 2,000 mg by mouth Daily.     • oxybutynin XL (DITROPAN-XL) 10 MG 24 hr tablet Take 10 mg by mouth Daily.     • pravastatin (PRAVACHOL) 40 MG tablet Take 1 tablet by mouth Daily. 90 tablet 2   • Ubiquinone (ULTRA COQ10 PO) Take 1 tablet by mouth Daily.     • warfarin (COUMADIN) 5 MG tablet Take 1 tablet by mouth Daily. 30 tablet 6     No current facility-administered medications for this visit.          Review of Systems     Constitution: Denies any fatigue, fever or chills    HENT: Denies any headache, hearing impairment,     Eyes: Denies  "any blurring of vision, or photophobia     Cardivascular - As per history of present illness     Respiratory system-Shortness of breath NYHA class II   sleep apnea.     Endocrine:  history of hyperlipidemia, diabetes,                      Hypothyrodism       Musculoskeletal:   history of arthritis with musculoskeletal problems    Gastrointestinal: No nausea, vomiting, or melena    Genitourinary: No dysuria or hematuria    Neurological:   No history of seizure disorder, stroke, memory problems    Psychiatric/Behavioral:        No history of depression    Hematological- no history of easy bruising or any bleeding diathesis            OBJECTIVE    /62   Pulse 75   Ht 172.7 cm (68\")   Wt 78.5 kg (173 lb)   BMI 26.30 kg/m²       Physical Exam     Constitutional: is oriented to person, place, and time.     Skin-warm and dry    Well developed and nourished in no acute distress      Head: Normocephalic and atraumatic.     Eyes: Pupils are equal    Neck: Neck supple. No bruit in the carotids    Cardiovascular: [place] in the fifth intercostal space   Regular rate, and  Rhythm,    S1 greater than S2, no S3 or S4, no gallop     Pulmonary/Chest:   Air  Entry is equal on both sides  No wheezing or crackles,      Abdominal: Soft.  No hepatosplenomegaly, bowel sounds are present    Musculoskeletal: No kyphoscoliosis, no significant thickening of the joints    Neurological: is alert and oriented to person, place, and time.    cranial nerve are intact .   No motor or sensory deficit    Extremities-no edema, no radial femoral delay      Psychiatric: He has a normal mood and affect.                  His behavior is normal.           Procedures      Lab Results   Component Value Date    WBC 5.72 07/07/2017    HGB 14.5 07/07/2017    HCT 44.7 07/07/2017    MCV 90.3 07/07/2017     07/07/2017     Lab Results   Component Value Date    GLUCOSE 105 (H) 07/07/2017    BUN 18 07/07/2017    CREATININE 0.68 07/07/2017    EGFRIFNONA " 83 07/07/2017    BCR 26.5 (H) 07/07/2017    CO2 26.0 07/07/2017    CALCIUM 9.5 07/07/2017    ALBUMIN 4.20 07/07/2017    LABIL2 1.6 07/07/2017    AST 54 (H) 07/07/2017    ALT 32 07/07/2017     Lab Results   Component Value Date    CHOL 135 07/07/2017     Lab Results   Component Value Date    TRIG 159 07/07/2017    TRIG 138 02/09/2016    TRIG 95 02/10/2015     Lab Results   Component Value Date    HDL 52 (L) 07/07/2017     No components found for: LDLCALC  Lab Results   Component Value Date    LDL 32 07/07/2017    LDL 68 02/09/2016    LDL 52 02/10/2015     No results found for: HDLLDLRATIO  No components found for: CHOLHDL  Lab Results   Component Value Date    HGBA1C 6.3 (H) 02/09/2016     Lab Results   Component Value Date    TSH 0.98 02/09/2016                  A/P  CAD prior stent placement doing well no chest pain has shortness of breath more abdominally due to deconditioning as she couldn't exercise for the past one month.  She takes Imdur for mild angina and she is doing tolerating the isosorbide 30 mg daily    Chronic atrial fibrillation with sick sinus syndrome status post pacemaker on warfarin and rate controlled with Toprol-XL.    Hyperlipidemia on pravastatin she takes it every other day due to myalgia.    Diabetes on metformin and A1c is good.    Hypothyroidism on Synthroid supplements.    Follow-up in 6 months          This document has been electronically signed by Nadeem Alejandro MD on July 3, 2018 1:19 PM       EMR Dragon/Transcription disclaimer:   Some of this note may be an electronic transcription/translation of spoken language to printed text. The electronic translation of spoken language may permit erroneous, or at times, nonsensical words or phrases to be inadvertently transcribed; Although I have reviewed the note for such errors, some may still exist.

## 2018-07-05 ENCOUNTER — ANTICOAGULATION VISIT (OUTPATIENT)
Dept: CARDIAC SURGERY | Facility: CLINIC | Age: 78
End: 2018-07-05

## 2018-07-05 DIAGNOSIS — Z79.01 LONG-TERM (CURRENT) USE OF ANTICOAGULANTS: ICD-10-CM

## 2018-07-05 DIAGNOSIS — I48.91 ATRIAL FIBRILLATION, UNSPECIFIED TYPE (HCC): ICD-10-CM

## 2018-07-05 LAB — INR PPP: 3.2

## 2018-07-05 NOTE — PROGRESS NOTES
Received above INR from pt's [facility]. Spoke with pt who denies any new medications, bleeding issues. PT states diet is the same. Adjusted pt's dose and instructed to increase vit k today. PT will recheck INR on Thursday, 7/12. Patient instructed regarding medication; results given and questions answered. Nutritional counseling given.  Dietary factors affecting therapy addressed.  Patient instructed to monitor for excessive bruising or bleeding. PT verbalizes understanding.         This document has been electronically signed by MICKIE Estrada on July 5, 2018 12:38 PM

## 2018-07-06 ENCOUNTER — APPOINTMENT (OUTPATIENT)
Dept: LAB | Facility: HOSPITAL | Age: 78
End: 2018-07-06
Attending: INTERNAL MEDICINE

## 2018-07-06 LAB
ALBUMIN SERPL-MCNC: 4.2 G/DL (ref 3.4–4.8)
ALBUMIN/GLOB SERPL: 1.4 G/DL (ref 1.1–1.8)
ALP SERPL-CCNC: 71 U/L (ref 38–126)
ALT SERPL W P-5'-P-CCNC: 26 U/L (ref 9–52)
ANION GAP SERPL CALCULATED.3IONS-SCNC: 8 MMOL/L (ref 5–15)
ARTICHOKE IGE QN: 38 MG/DL (ref 1–129)
AST SERPL-CCNC: 49 U/L (ref 14–36)
BILIRUB SERPL-MCNC: 0.6 MG/DL (ref 0.2–1.3)
BUN BLD-MCNC: 18 MG/DL (ref 7–21)
BUN/CREAT SERPL: 26.5 (ref 7–25)
CALCIUM SPEC-SCNC: 9.3 MG/DL (ref 8.4–10.2)
CHLORIDE SERPL-SCNC: 103 MMOL/L (ref 95–110)
CHOLEST SERPL-MCNC: 152 MG/DL (ref 0–199)
CO2 SERPL-SCNC: 27 MMOL/L (ref 22–31)
CREAT BLD-MCNC: 0.68 MG/DL (ref 0.5–1)
GFR SERPL CREATININE-BSD FRML MDRD: 83 ML/MIN/1.73 (ref 39–90)
GLOBULIN UR ELPH-MCNC: 3 GM/DL (ref 2.3–3.5)
GLUCOSE BLD-MCNC: 102 MG/DL (ref 60–100)
HDLC SERPL-MCNC: 46 MG/DL (ref 60–200)
LDLC/HDLC SERPL: 1.58 {RATIO} (ref 0–3.22)
POTASSIUM BLD-SCNC: 4.6 MMOL/L (ref 3.5–5.1)
PROT SERPL-MCNC: 7.2 G/DL (ref 6.3–8.6)
SODIUM BLD-SCNC: 138 MMOL/L (ref 137–145)
TRIGL SERPL-MCNC: 167 MG/DL (ref 20–199)
TSH SERPL DL<=0.05 MIU/L-ACNC: 0.96 MIU/ML (ref 0.46–4.68)

## 2018-07-06 PROCEDURE — 80053 COMPREHEN METABOLIC PANEL: CPT | Performed by: INTERNAL MEDICINE

## 2018-07-06 PROCEDURE — 80061 LIPID PANEL: CPT | Performed by: INTERNAL MEDICINE

## 2018-07-06 PROCEDURE — 36415 COLL VENOUS BLD VENIPUNCTURE: CPT | Performed by: INTERNAL MEDICINE

## 2018-07-06 PROCEDURE — 84443 ASSAY THYROID STIM HORMONE: CPT | Performed by: INTERNAL MEDICINE

## 2018-07-06 PROCEDURE — 85027 COMPLETE CBC AUTOMATED: CPT | Performed by: INTERNAL MEDICINE

## 2018-07-10 LAB
DEPRECATED RDW RBC AUTO: 44.2 FL (ref 36.4–46.3)
ERYTHROCYTE [DISTWIDTH] IN BLOOD BY AUTOMATED COUNT: 13.6 % (ref 11.5–14.5)
HCT VFR BLD AUTO: 42.8 % (ref 35–45)
HGB BLD-MCNC: 14.5 G/DL (ref 12–15.5)
MCH RBC QN AUTO: 30 PG (ref 26.5–34)
MCHC RBC AUTO-ENTMCNC: 33.9 G/DL (ref 31.4–36)
MCV RBC AUTO: 88.6 FL (ref 80–98)
PLATELET # BLD AUTO: 235 10*3/MM3 (ref 150–450)
PMV BLD AUTO: 10.6 FL (ref 8–12)
RBC # BLD AUTO: 4.83 10*6/MM3 (ref 3.77–5.16)
WBC NRBC COR # BLD: 6.45 10*3/MM3 (ref 3.2–9.8)

## 2018-07-11 ENCOUNTER — TELEPHONE (OUTPATIENT)
Dept: CARDIOLOGY | Facility: CLINIC | Age: 78
End: 2018-07-11

## 2018-07-11 NOTE — TELEPHONE ENCOUNTER
Attempted to contact pt twice and was hung up on both times.  Called home number and left message.

## 2018-07-12 ENCOUNTER — ANTICOAGULATION VISIT (OUTPATIENT)
Dept: CARDIAC SURGERY | Facility: CLINIC | Age: 78
End: 2018-07-12

## 2018-07-12 DIAGNOSIS — I48.91 ATRIAL FIBRILLATION, UNSPECIFIED TYPE (HCC): ICD-10-CM

## 2018-07-12 DIAGNOSIS — Z79.01 LONG-TERM (CURRENT) USE OF ANTICOAGULANTS: ICD-10-CM

## 2018-07-12 LAB — INR PPP: 1.8

## 2018-07-12 NOTE — PROGRESS NOTES
Received above INR from pt's home meter. Spoke with pt who states she ate green 2x as directed. Denies any missed doses or excessive k. Adjusted pt's dose and instructed to hold green till Saturday but then eat green every 3rd day. PT will recheck INR on 7/19. Patient instructed regarding medication; results given and questions answered. Nutritional counseling given.  Dietary factors affecting therapy addressed.  Patient instructed to monitor for excessive bruising or bleeding. PT verbalizes understanding.         This document has been electronically signed by MICKIE Estrada on July 12, 2018 1:12 PM

## 2018-07-13 ENCOUNTER — TELEPHONE (OUTPATIENT)
Dept: CARDIOLOGY | Facility: CLINIC | Age: 78
End: 2018-07-13

## 2018-07-19 ENCOUNTER — ANTICOAGULATION VISIT (OUTPATIENT)
Dept: CARDIAC SURGERY | Facility: CLINIC | Age: 78
End: 2018-07-19

## 2018-07-19 DIAGNOSIS — Z79.01 LONG-TERM (CURRENT) USE OF ANTICOAGULANTS: ICD-10-CM

## 2018-07-19 DIAGNOSIS — I48.91 ATRIAL FIBRILLATION, UNSPECIFIED TYPE (HCC): ICD-10-CM

## 2018-07-19 LAB — INR PPP: 3 (ref 0.9–1.1)

## 2018-07-19 NOTE — PROGRESS NOTES
Received above INR from pt's home meter. Spoke with pt who states she fell on Sunday and broke ribs. PT has been taking Tylenol 3 TID since. States she has only had green x1. Denies any bleeding issues. Due to rise in INR with pain medication, dose was cut back slightly and pt will increase vitamin k x2 this week. Patient instructed regarding medication; results given and questions answered. Nutritional counseling given.  Dietary factors affecting therapy addressed.  Patient instructed to monitor for excessive bruising or bleeding. PT verbalizes understanding and will recheck INR on 7/26.

## 2018-07-26 ENCOUNTER — ANTICOAGULATION VISIT (OUTPATIENT)
Dept: CARDIAC SURGERY | Facility: CLINIC | Age: 78
End: 2018-07-26

## 2018-07-26 DIAGNOSIS — Z79.01 LONG-TERM (CURRENT) USE OF ANTICOAGULANTS: ICD-10-CM

## 2018-07-26 DIAGNOSIS — I48.91 ATRIAL FIBRILLATION, UNSPECIFIED TYPE (HCC): ICD-10-CM

## 2018-07-26 LAB — INR PPP: 1.7 (ref 0.9–1.1)

## 2018-07-26 PROCEDURE — 85610 PROTHROMBIN TIME: CPT | Performed by: NURSE PRACTITIONER

## 2018-07-26 PROCEDURE — 99211 OFF/OP EST MAY X REQ PHY/QHP: CPT | Performed by: NURSE PRACTITIONER

## 2018-07-26 NOTE — PROGRESS NOTES
PT had issues with Home Meter and came to [facility]. PT's Home Meter was reset and pt will call [facility] for further instructions. Denies any s/s of blood clot. PT has been taking Tylenol 3 but took last one yesterday. Denies any missed doses or excessive k. Adjusted pt's dose and instructed to hold green vegs for 2 days. Patient instructed regarding medication; results given and questions answered. Nutritional counseling given.  Dietary factors affecting therapy addressed.  Patient instructed to monitor for excessive bruising or bleeding. PT will recheck INR on 8/2 per Home Meter but will call [facility] if she has any issues. PT verbalizes understanding.         This document has been electronically signed by Jeanne Centeno, MARICARMEN @ on July 26, 2018 3:43 PM

## 2018-08-03 ENCOUNTER — ANTICOAGULATION VISIT (OUTPATIENT)
Dept: CARDIAC SURGERY | Facility: CLINIC | Age: 78
End: 2018-08-03

## 2018-08-03 DIAGNOSIS — Z79.01 LONG-TERM (CURRENT) USE OF ANTICOAGULANTS: ICD-10-CM

## 2018-08-03 DIAGNOSIS — I48.91 ATRIAL FIBRILLATION, UNSPECIFIED TYPE (HCC): ICD-10-CM

## 2018-08-03 LAB — INR PPP: 3

## 2018-08-03 NOTE — PROGRESS NOTES
PT was out of strips las night and was unable to test. PT did take 5mg last night. Received above INR from pt's home meter. PT denies any bleeding issues or s/s of blood clot. PT is now taking Percocet BID post fall. PT states she would like to increase green. PT instructed to continue same dose but have serving of green 3x this week. Patient instructed regarding medication; results given and questions answered. Nutritional counseling given.  Dietary factors affecting therapy addressed.  Patient instructed to monitor for excessive bruising or bleeding. PT verbalizes understanding and will recheck INR on 8/9.         This document has been electronically signed by Jeanne Centeno, MARICARMEN @ on August 3, 2018 10:13 AM

## 2018-08-09 ENCOUNTER — ANTICOAGULATION VISIT (OUTPATIENT)
Dept: CARDIAC SURGERY | Facility: CLINIC | Age: 78
End: 2018-08-09

## 2018-08-09 DIAGNOSIS — I48.91 ATRIAL FIBRILLATION, UNSPECIFIED TYPE (HCC): ICD-10-CM

## 2018-08-09 DIAGNOSIS — Z79.01 LONG-TERM (CURRENT) USE OF ANTICOAGULANTS: ICD-10-CM

## 2018-08-09 LAB — INR PPP: 3.9

## 2018-08-09 NOTE — PROGRESS NOTES
Received above INR from pt's Home Meter. Spoke with pt who states she was taking Percocet and Tylenol OTC which may have caused elevation. PT denies any bleeding issues or s/s of blood clot. Adjusted pt's dose and instructed to increase vit k with turnip greens or broccoli today and tomorrow. Recheck INR on 8/16 when she can recheck due to work schedule. Patient instructed regarding medication; results given and questions answered. Nutritional counseling given.  Dietary factors affecting therapy addressed.  Patient instructed to monitor for excessive bruising or bleeding. PT verbalizes understanding.         This document has been electronically signed by Jeanne Centeno, MARICARMEN @ on August 9, 2018 2:10 PM

## 2018-08-16 ENCOUNTER — ANTICOAGULATION VISIT (OUTPATIENT)
Dept: CARDIAC SURGERY | Facility: CLINIC | Age: 78
End: 2018-08-16

## 2018-08-16 DIAGNOSIS — I48.91 ATRIAL FIBRILLATION, UNSPECIFIED TYPE (HCC): ICD-10-CM

## 2018-08-16 DIAGNOSIS — Z79.01 LONG-TERM (CURRENT) USE OF ANTICOAGULANTS: ICD-10-CM

## 2018-08-16 LAB — INR PPP: 1.9 (ref 0.9–1.1)

## 2018-08-16 PROCEDURE — 85610 PROTHROMBIN TIME: CPT | Performed by: NURSE PRACTITIONER

## 2018-08-16 NOTE — PROGRESS NOTES
Received above INR from pt's home meter. Denies any new medications or bleeding issues. PT ate salad x3 and 1 serving of turnip greens. Due to drop in INR, dose was increased but pt will continue with higher vitamin k diet. Patient instructed regarding medication; results given and questions answered. Nutritional counseling given.  Dietary factors affecting therapy addressed.  Patient instructed to monitor for excessive bruising or bleeding. PT will recheck INR on 8/23. PT verbalizes understanding.         This document has been electronically signed by Jeanne Centeno, MARICARMEN @ on August 16, 2018 10:42 AM

## 2018-08-23 ENCOUNTER — ANTICOAGULATION VISIT (OUTPATIENT)
Dept: CARDIAC SURGERY | Facility: CLINIC | Age: 78
End: 2018-08-23

## 2018-08-23 DIAGNOSIS — Z79.01 LONG-TERM (CURRENT) USE OF ANTICOAGULANTS: ICD-10-CM

## 2018-08-23 DIAGNOSIS — I48.91 ATRIAL FIBRILLATION, UNSPECIFIED TYPE (HCC): ICD-10-CM

## 2018-08-23 LAB — INR PPP: 2.8

## 2018-08-23 NOTE — PROGRESS NOTES
Received above INR from pt's home meter. Denies any med changes or bleeding issues. PT consumed green x3. PT instructed to continue same dose and Coumadin friendly diet. PT will recheck INR on 8/30. Patient instructed regarding medication; results given and questions answered. Nutritional counseling given.  Dietary factors affecting therapy addressed.  Patient instructed to monitor for excessive bruising or bleeding.PT verbalizes understanding.         This document has been electronically signed by MICKIE Estrada on August 23, 2018 10:24 AM

## 2018-08-24 RX ORDER — PRAVASTATIN SODIUM 40 MG
TABLET ORAL
Qty: 90 TABLET | Refills: 1 | Status: SHIPPED | OUTPATIENT
Start: 2018-08-24 | End: 2019-08-06 | Stop reason: SDUPTHER

## 2018-08-30 ENCOUNTER — ANTICOAGULATION VISIT (OUTPATIENT)
Dept: CARDIAC SURGERY | Facility: CLINIC | Age: 78
End: 2018-08-30

## 2018-08-30 DIAGNOSIS — I48.91 ATRIAL FIBRILLATION, UNSPECIFIED TYPE (HCC): ICD-10-CM

## 2018-08-30 DIAGNOSIS — Z79.01 LONG-TERM (CURRENT) USE OF ANTICOAGULANTS: ICD-10-CM

## 2018-08-30 LAB — INR PPP: 2.3

## 2018-08-30 NOTE — PROGRESS NOTES
Received above INR from pt's [facility] home meter. Spoke with pt over the phone who denies any new medications or bleeding issues. Denies any s/s of blood clot. Instructed to continue same dosing and Coumadin friendly diet. Recheck INR On 9/13. Patient instructed regarding medication; results given and questions answered. Nutritional counseling given.  Dietary factors affecting therapy addressed.  Patient instructed to monitor for excessive bruising or bleeding. Pt repeated back correctly.         This document has been electronically signed by Jeanne Centeno, MARICARMEN @ on August 30, 2018 11:31 AM

## 2018-09-13 ENCOUNTER — ANTICOAGULATION VISIT (OUTPATIENT)
Dept: CARDIAC SURGERY | Facility: CLINIC | Age: 78
End: 2018-09-13

## 2018-09-13 DIAGNOSIS — Z79.01 LONG-TERM (CURRENT) USE OF ANTICOAGULANTS: ICD-10-CM

## 2018-09-13 DIAGNOSIS — I48.91 ATRIAL FIBRILLATION, UNSPECIFIED TYPE (HCC): ICD-10-CM

## 2018-09-13 LAB — INR PPP: 2.1 (ref 0.9–1.1)

## 2018-09-13 PROCEDURE — 85610 PROTHROMBIN TIME: CPT | Performed by: NURSE PRACTITIONER

## 2018-09-13 NOTE — PROGRESS NOTES
Received above INR from pt's home meter. Spoke with pt who denies any med changes or bleeding issues. Instructed to continue same dose and Coumadin friendly diet. Recheck INR on 10/4. Patient instructed regarding medication; results given and questions answered. Nutritional counseling given.  Dietary factors affecting therapy addressed.  Patient instructed to monitor for excessive bruising or bleeding. PT verbalizes understanding.         This document has been electronically signed by Jeanne Centeno, MARICARMEN @ on September 13, 2018 10:35 AM

## 2018-09-27 RX ORDER — ISOSORBIDE MONONITRATE 30 MG/1
TABLET, EXTENDED RELEASE ORAL
Qty: 30 TABLET | Refills: 6 | Status: SHIPPED | OUTPATIENT
Start: 2018-09-27 | End: 2019-04-23 | Stop reason: SDUPTHER

## 2018-10-04 ENCOUNTER — ANTICOAGULATION VISIT (OUTPATIENT)
Dept: CARDIAC SURGERY | Facility: CLINIC | Age: 78
End: 2018-10-04

## 2018-10-04 DIAGNOSIS — I48.91 ATRIAL FIBRILLATION, UNSPECIFIED TYPE (HCC): ICD-10-CM

## 2018-10-04 LAB — INR PPP: 2.1

## 2018-10-04 NOTE — PROGRESS NOTES
Received above INR from pt's [facility] home meter. Spoke with pt who denies any new medications or bleeding issues. Denies any s/s of blood clot. PT instructed to continue same dose and Coumadin friendly diet. PT will recheck INR on 11/1. Patient instructed regarding medication; results given and questions answered. Nutritional counseling given.  Dietary factors affecting therapy addressed.  Patient instructed to monitor for excessive bruising or bleeding. PT verbalizes understanding.         This document has been electronically signed by MICKIE Estrada on October 4, 2018 10:49 AM

## 2018-10-10 ENCOUNTER — CLINICAL SUPPORT (OUTPATIENT)
Dept: CARDIOLOGY | Facility: CLINIC | Age: 78
End: 2018-10-10

## 2018-10-10 DIAGNOSIS — I49.5 SSS (SICK SINUS SYNDROME) (HCC): Primary | ICD-10-CM

## 2018-10-10 DIAGNOSIS — I48.91 ATRIAL FIBRILLATION, UNSPECIFIED TYPE (HCC): ICD-10-CM

## 2018-10-10 DIAGNOSIS — Z95.0 PACEMAKER: ICD-10-CM

## 2018-10-10 PROCEDURE — 93288 INTERROG EVL PM/LDLS PM IP: CPT | Performed by: NURSE PRACTITIONER

## 2018-10-10 NOTE — PROGRESS NOTES
Pacemaker Evaluation Report    October 10, 2018    Primary Cardiologist: Dr. Alejandro  Implanting MD: Dr. Elam  :[employer] Model: Accent DF RF 2210 Serial Number: [id number]  Implant date: 2/12/2014    Reason for evaluation:routine  Office  PPM  Cardiac device indication(s): sick sinus syndrome    Battery  REINA: 4.5-6 years    2.89v   Last charge: 0    Interrogation Results  Atrial sensing: P wave: 4.6 mV  Atrial capture: AFib   Atrial lead impedance: 330 ohms  Ventricular sensing: R wave: 7.5 mV  Ventricular capture: 0.75 V @ 0.5 ms  Ventricular lead impedance: right  690 ohms    Parameters  Mode: DDDR  Base Rate: 65/120    Diagnostic Data  Atrial paced: <1 % Ventricular paced: 47 %  Mode switch: 100%  AT/AF [place]: 0%  AHR: On going A Fib  VHR: AF with RVR    Changes made: No changes made    Conclusions: normal device function    Assessment:  1. SSS (sick sinus syndrome) (CMS/HCC)    2. Pacemaker    3. Atrial fibrillation, unspecified type (CMS/HCC)    - coumadin          This document has been electronically signed by MICKIE Hogan on October 10, 2018 4:25 PM

## 2018-10-15 RX ORDER — WARFARIN SODIUM 5 MG/1
TABLET ORAL
Qty: 30 TABLET | Refills: 5 | Status: SHIPPED | OUTPATIENT
Start: 2018-10-15 | End: 2020-02-07

## 2018-11-08 ENCOUNTER — ANTICOAGULATION VISIT (OUTPATIENT)
Dept: CARDIAC SURGERY | Facility: CLINIC | Age: 78
End: 2018-11-08

## 2018-11-08 VITALS — HEART RATE: 72 BPM | DIASTOLIC BLOOD PRESSURE: 72 MMHG | SYSTOLIC BLOOD PRESSURE: 112 MMHG

## 2018-11-08 DIAGNOSIS — Z79.01 LONG TERM CURRENT USE OF ANTICOAGULANT THERAPY: ICD-10-CM

## 2018-11-08 DIAGNOSIS — I48.91 ATRIAL FIBRILLATION, UNSPECIFIED TYPE (HCC): ICD-10-CM

## 2018-11-08 LAB — INR PPP: 2.2 (ref 0.9–1.1)

## 2018-11-08 PROCEDURE — 85610 PROTHROMBIN TIME: CPT | Performed by: NURSE PRACTITIONER

## 2018-11-08 NOTE — PROGRESS NOTES
PT was seen in office today due to home meter strips being recalled. Denies any new medications or bleeding issues. PT denies any s/s of blood clot. PT instructed to continue same dose and Coumadin friendly diet. PT will recheck INR on 12/6 with home meter if she has test strips. Patient instructed regarding medication; results given and questions answered. Nutritional counseling given.  Dietary factors affecting therapy addressed.  Patient instructed to monitor for excessive bruising or bleeding. PT verbalizes understanding.         This document has been electronically signed by MICKIE Estrada on November 8, 2018 2:52 PM

## 2018-12-06 ENCOUNTER — ANTICOAGULATION VISIT (OUTPATIENT)
Dept: CARDIAC SURGERY | Facility: CLINIC | Age: 78
End: 2018-12-06

## 2018-12-06 DIAGNOSIS — Z79.01 LONG TERM CURRENT USE OF ANTICOAGULANT THERAPY: ICD-10-CM

## 2018-12-06 DIAGNOSIS — I48.91 ATRIAL FIBRILLATION, UNSPECIFIED TYPE (HCC): ICD-10-CM

## 2018-12-06 LAB — INR PPP: 2.2 (ref 0.9–1.1)

## 2018-12-06 PROCEDURE — 85610 PROTHROMBIN TIME: CPT | Performed by: NURSE PRACTITIONER

## 2018-12-06 NOTE — PROGRESS NOTES
Received above INR from pt's home meter. Denies any new medications or bleeding issues. Denies any s/s of blood clot. PT instructed to continue same dose and Coumadin friendly diet. PT will recheck INR on 1/3. Patient instructed regarding medication; results given and questions answered. Nutritional counseling given.  Dietary factors affecting therapy addressed.  Patient instructed to monitor for excessive bruising or bleeding. Pt verbalizes understanding.         This document has been electronically signed by MICKIE Estrada on December 6, 2018 9:28 AM

## 2019-01-08 ENCOUNTER — ANTICOAGULATION VISIT (OUTPATIENT)
Dept: CARDIAC SURGERY | Facility: CLINIC | Age: 79
End: 2019-01-08

## 2019-01-08 DIAGNOSIS — Z79.01 LONG TERM CURRENT USE OF ANTICOAGULANT THERAPY: ICD-10-CM

## 2019-01-08 DIAGNOSIS — I48.91 ATRIAL FIBRILLATION, UNSPECIFIED TYPE (HCC): ICD-10-CM

## 2019-01-08 LAB — INR PPP: 1.9

## 2019-01-08 NOTE — PROGRESS NOTES
Spoke to pt over the phone who self-tested today.  Pt denies missed coumadin doses or consuming too much green.  Pt maintenance medications have not changed.  No bleeding issues.  Adjusted coumadin dose for today only and instructed pt to limit green intake today as well.  Instructed pt to self-test again on Tuesday, Jan 22nd, if therapeutic then, place out for one month.  Pt verbalized instructions.  Patient instructed regarding medication; results given and questions answered. Nutritional counseling given.  Dietary factors affecting therapy addressed.  Patient instructed to monitor for excessive bruising or bleeding.        This document has been electronically signed by Jeanne Centeno, MARICARMEN @ on January 8, 2019 10:07 AM

## 2019-01-22 ENCOUNTER — OFFICE VISIT (OUTPATIENT)
Dept: CARDIOLOGY | Facility: CLINIC | Age: 79
End: 2019-01-22

## 2019-01-22 ENCOUNTER — ANTICOAGULATION VISIT (OUTPATIENT)
Dept: CARDIAC SURGERY | Facility: CLINIC | Age: 79
End: 2019-01-22

## 2019-01-22 VITALS
DIASTOLIC BLOOD PRESSURE: 60 MMHG | BODY MASS INDEX: 25.54 KG/M2 | SYSTOLIC BLOOD PRESSURE: 110 MMHG | HEART RATE: 64 BPM | WEIGHT: 168.5 LBS | OXYGEN SATURATION: 96 % | HEIGHT: 68 IN

## 2019-01-22 DIAGNOSIS — Z79.01 LONG TERM CURRENT USE OF ANTICOAGULANT THERAPY: ICD-10-CM

## 2019-01-22 DIAGNOSIS — I10 ESSENTIAL HYPERTENSION: ICD-10-CM

## 2019-01-22 DIAGNOSIS — I48.91 ATRIAL FIBRILLATION, UNSPECIFIED TYPE (HCC): ICD-10-CM

## 2019-01-22 DIAGNOSIS — I25.119 CORONARY ARTERY DISEASE INVOLVING NATIVE CORONARY ARTERY OF NATIVE HEART WITH ANGINA PECTORIS (HCC): Primary | ICD-10-CM

## 2019-01-22 DIAGNOSIS — I49.5 SSS (SICK SINUS SYNDROME) (HCC): ICD-10-CM

## 2019-01-22 DIAGNOSIS — E78.2 MIXED HYPERLIPIDEMIA: ICD-10-CM

## 2019-01-22 LAB — INR PPP: 2.8

## 2019-01-22 PROCEDURE — 99214 OFFICE O/P EST MOD 30 MIN: CPT | Performed by: INTERNAL MEDICINE

## 2019-01-22 NOTE — PROGRESS NOTES
[facility]  OFFICE NOTE    Kirsten Rosales  78 y.o. female    01/22/2019  1. Coronary artery disease involving native coronary artery of native heart with angina pectoris (CMS/HCC)    2. Mixed hyperlipidemia    3. Essential hypertension    4. SSS (sick sinus syndrome) (CMS/HCC)    5. Atrial fibrillation, unspecified type (CMS/HCC)        Chief complaint -Follow-up CAD      History of present Illness- 78-year-old lady with history of coronary disease prior stent placement in the  Past, has chronic atrial fibrillation with sick sinus syndrome on warfarin for anticoagulation.  She is doing well no chest pain or shortness of breath she used to exercise regularly she hasn't done it for about a month.  Her most recent A1c was 6.5 in 10/ 2018.  We'll do all the other blood work in July 2019.  She has her pacemaker checked regularly through the pacemaker clinic.  She denies any GI symptoms or CNS symptoms              Allergies   Allergen Reactions   • Aspirin Hives   • Celebrex [Celecoxib] Hives   • Rythmol [Propafenone] Nausea And Vomiting         Past Medical History:   Diagnosis Date   • Atherosclerotic heart disease of native coronary artery with unspecified angina pectoris (CMS/HCC)    • Atrial fibrillation (CMS/HCC)    • Breath shortness    • CAD (coronary artery disease)    • Hyperlipidemia    • Long term (current) use of anticoagulants    • Sick sinus syndrome (CMS/HCC)    • Unspecified hypothyroidism          Past Surgical History:   Procedure Laterality Date   • BACK SURGERY     • CARDIAC CATHETERIZATION      2/2016   • CARDIAC PACEMAKER PLACEMENT     • CARDIOVERSION     • CATARACT EXTRACTION W/ INTRAOCULAR LENS IMPLANT Right 3/16/2018    Procedure: CATARACT PHACO EXTRACTION WITH INTRAOCULAR LENS IMPLANT;  Surgeon: Umesh Thibodeaux MD;  Location: [facility];  Service: Ophthalmology   • CATARACT EXTRACTION W/ INTRAOCULAR LENS IMPLANT Left 3/23/2018    Procedure: CATARACT  PHACO EXTRACTION WITH INTRAOCULAR LENS IMPLANT;  Surgeon: Umesh Thibodeaux MD;  Location: [facility] OR;  Service: Ophthalmology   • COLONOSCOPY N/A 1/4/2018    Procedure: COLONOSCOPY;  Surgeon: Albino Juan DO;  Location: [facility] ENDOSCOPY;  Service:    • CORONARY ANGIOPLASTY     • PACEMAKER IMPLANTATION           History reviewed. No pertinent family history.      Social History     Socioeconomic History   • Marital status:      Spouse name: Not on file   • Number of children: Not on file   • Years of education: Not on file   • Highest education level: Not on file   Social Needs   • Financial resource strain: Not on file   • Food insecurity - worry: Not on file   • Food insecurity - inability: Not on file   • Transportation needs - medical: Not on file   • Transportation needs - non-medical: Not on file   Occupational History   • Not on file   Tobacco Use   • Smoking status: Former Smoker   • Smokeless tobacco: Never Used   • Tobacco comment: man 30 years ago   Substance and Sexual Activity   • Alcohol use: No   • Drug use: No   • Sexual activity: Defer   Other Topics Concern   • Not on file   Social History Narrative   • Not on file         Current Outpatient Medications   Medication Sig Dispense Refill   • docusate sodium (COLACE) 100 MG capsule Take 100 mg by mouth 2 (Two) Times a Day.     • isosorbide mononitrate (IMDUR) 30 MG 24 hr tablet TAKE ONE TABLET BY MOUTH DAILY 30 tablet 6   • levothyroxine (SYNTHROID, LEVOTHROID) 112 MCG tablet Take 112 mcg by mouth Daily.     • Melatonin 10 MG tablet Take 20 mg by mouth Every Night.     • metFORMIN (GLUCOPHAGE) 500 MG tablet Take 500 mg by mouth 2 (Two) Times a Day.     • metoprolol succinate XL (TOPROL-XL) 50 MG 24 hr tablet Take 1 tablet by mouth 2 (Two) Times a Day. 180 tablet 2   • mirtazapine (REMERON) 7.5 MG half tablet Take 7.5 mg by mouth Every Night.     • Omega-3 Fatty Acids (OMEGA 3 PO) Take 2,000 mg by mouth Daily.     • pravastatin  "(PRAVACHOL) 40 MG tablet TAKE ONE TABLET BY MOUTH DAILY 90 tablet 1   • Ubiquinone (ULTRA COQ10 PO) Take 1 tablet by mouth Daily.     • warfarin (COUMADIN) 5 MG tablet Take 1 tablet nightly OR as directed by coumadin clinic 30 tablet 5     No current facility-administered medications for this visit.          Review of Systems     Constitution: Denies any fatigue, fever or chills    HENT: Denies any headache, hearing impairment,     Eyes: Denies any blurring of vision, or photophobia     Cardivascular - As per history of present illness     Respiratory system-Shortness of breath NYHA class II   sleep apnea.     Endocrine:  history of hyperlipidemia, diabetes,                      Hypothyrodism       Musculoskeletal:   history of arthritis with musculoskeletal problems    Gastrointestinal: No nausea, vomiting, or melena    Genitourinary: No dysuria or hematuria    Neurological:   No history of seizure disorder, stroke, memory problems    Psychiatric/Behavioral:        No history of depression    Hematological- no history of easy bruising or any bleeding diathesis            OBJECTIVE    /60   Pulse 64   Ht 172.7 cm (67.99\")   Wt 76.4 kg (168 lb 8 oz)   SpO2 96%   BMI 25.63 kg/m²       Physical Exam     Constitutional: is oriented to person, place, and time.     Skin-warm and dry    Well developed and nourished in no acute distress      Head: Normocephalic and atraumatic.     Eyes: Pupils are equal    Neck: Neck supple. No bruit in the carotids    Cardiovascular: [place] in the fifth intercostal space   Regular rate, and  Rhythm,    S1 greater than S2,     Pulmonary/Chest:   Air  Entry is equal on both sides  No wheezing or crackles,      Abdominal: Soft.  No hepatosplenomegaly, bowel sounds are present    Musculoskeletal: No kyphoscoliosis, no significant thickening of the joints    Neurological: is alert and oriented to person, place, and time.    cranial nerve are intact .   No motor or sensory " deficit    Extremities-no edema, no radial femoral delay      Psychiatric: He has a normal mood and affect.                  His behavior is normal.           Procedures      Lab Results   Component Value Date    WBC 6.45 07/06/2018    HGB 14.5 07/06/2018    HCT 42.8 07/06/2018    MCV 88.6 07/06/2018     07/06/2018     Lab Results   Component Value Date    GLUCOSE 102 (H) 07/06/2018    BUN 18 07/06/2018    CREATININE 0.68 07/06/2018    EGFRIFNONA 83 07/06/2018    BCR 26.5 (H) 07/06/2018    CO2 27.0 07/06/2018    CALCIUM 9.3 07/06/2018    ALBUMIN 4.20 07/06/2018    AST 49 (H) 07/06/2018    ALT 26 07/06/2018     Lab Results   Component Value Date    CHOL 152 07/06/2018    CHOL 135 07/07/2017     Lab Results   Component Value Date    TRIG 167 07/06/2018    TRIG 159 07/07/2017    TRIG 138 02/09/2016     Lab Results   Component Value Date    HDL 46 (L) 07/06/2018    HDL 52 (L) 07/07/2017     No components found for: LDLCALC  Lab Results   Component Value Date    LDL 38 07/06/2018    LDL 32 07/07/2017    LDL 68 02/09/2016     No results found for: HDLLDLRATIO  No components found for: CHOLHDL  Lab Results   Component Value Date    HGBA1C 6.3 (H) 02/09/2016     Lab Results   Component Value Date    TSH 0.960 07/06/2018                  A/P  CAD prior stent placement doing well no chest pain.  She takes Imdur for mild angina and she is doing tolerating the isosorbide 30 mg daily, I asked her to go back to fitness from the fourth exercise regularly    Chronic atrial fibrillation with sick sinus syndrome status post pacemaker on warfarin and rate controlled with Toprol-XL.    Hyperlipidemia on pravastatin she takes it every other day due to myalgia.  Will do the blood work in July 2018    Diabetes on metformin and A1c is good.    Hypothyroidism on Synthroid supplements.    Follow-up in 6 months          This document has been electronically signed by Nadeem Alejandro MD on January 22, 2019 2:57 PM       EMR  Dragon/Transcription disclaimer:   Some of this note may be an electronic transcription/translation of spoken language to printed text. The electronic translation of spoken language may permit erroneous, or at times, nonsensical words or phrases to be inadvertently transcribed; Although I have reviewed the note for such errors, some may still exist.

## 2019-01-22 NOTE — PROGRESS NOTES
Spoke to pt over the phone who self-tested today.  Pt denies med changes or bleeding issues.  Verified coumadin dose with pt and instructed to continue the same.  Instructed pt to recheck INR on Thursday, Feb 21st.  Pt verbalized instructions.  Patient instructed regarding medication; results given and questions answered. Nutritional counseling given.  Dietary factors affecting therapy addressed.  Patient instructed to monitor for excessive bruising or bleeding.        This document has been electronically signed by Jeanne Centeno, MARICARMEN @ on January 22, 2019 9:47 AM

## 2019-02-21 ENCOUNTER — ANTICOAGULATION VISIT (OUTPATIENT)
Dept: CARDIAC SURGERY | Facility: CLINIC | Age: 79
End: 2019-02-21

## 2019-02-21 DIAGNOSIS — I48.91 ATRIAL FIBRILLATION, UNSPECIFIED TYPE (HCC): ICD-10-CM

## 2019-02-21 DIAGNOSIS — Z79.01 LONG TERM CURRENT USE OF ANTICOAGULANT THERAPY: ICD-10-CM

## 2019-02-21 LAB — INR PPP: 3.3

## 2019-02-21 NOTE — PROGRESS NOTES
Received today's INR of 3.3 from pt's home meter. Spoke with pt who denies any new medications, bleeding issues, or s/s of blood clot. PT denies any grapefruit, alcohol, OTC meds, or vomiting/diarrhea. Adjusted pt's dose for today only due to TTR and instructed to increase vit k today. PT will recheck INR on 3/7. Patient instructed regarding medication; results given and questions answered. Nutritional counseling given.  Dietary factors affecting therapy addressed.  Patient instructed to monitor for excessive bruising or bleeding. PT repeated back correctly and verbalizes understanding.         This document has been electronically signed by Jeanne Centeno, MARICARMEN @ on February 21, 2019 1:56 PM

## 2019-03-07 ENCOUNTER — ANTICOAGULATION VISIT (OUTPATIENT)
Dept: CARDIAC SURGERY | Facility: CLINIC | Age: 79
End: 2019-03-07

## 2019-03-07 DIAGNOSIS — Z79.01 LONG TERM CURRENT USE OF ANTICOAGULANT THERAPY: ICD-10-CM

## 2019-03-07 DIAGNOSIS — I48.91 ATRIAL FIBRILLATION, UNSPECIFIED TYPE (HCC): ICD-10-CM

## 2019-03-07 LAB — INR PPP: 2

## 2019-03-07 NOTE — PROGRESS NOTES
Received today's INR of 2.0 from pt's [facility] home meter. Spoke with pt who denies any med changes or bleeding issues. Instructed to continue same dose and recheck INR on 3/21. Patient instructed regarding medication; results given and questions answered. Nutritional counseling given.  Dietary factors affecting therapy addressed.  Patient instructed to monitor for excessive bruising or bleeding. PT verbalizes understanding and repeated back correctly.         This document has been electronically signed by Jeanne Centeno, MARICARMEN @ on March 7, 2019 11:26 AM

## 2019-03-21 ENCOUNTER — ANTICOAGULATION VISIT (OUTPATIENT)
Dept: CARDIAC SURGERY | Facility: CLINIC | Age: 79
End: 2019-03-21

## 2019-03-21 DIAGNOSIS — I48.91 ATRIAL FIBRILLATION, UNSPECIFIED TYPE (HCC): ICD-10-CM

## 2019-03-21 DIAGNOSIS — Z79.01 LONG TERM CURRENT USE OF ANTICOAGULANT THERAPY: ICD-10-CM

## 2019-03-21 LAB — INR PPP: 2.4

## 2019-03-21 NOTE — PROGRESS NOTES
Today's INR is 2.4 which was received per pt's [facility] monitor. Denies any new medications or bleeding issues. Denies any s/s of blood clot. PT instructed to continue same dose and recheck INR on 4/18. Patient instructed regarding medication; results given and questions answered. Nutritional counseling given.  Dietary factors affecting therapy addressed.  Patient instructed to monitor for excessive bruising or bleeding. PT repeated back correctly.         This document has been electronically signed by MARICARMEN Moser @ on March 21, 2019 12:52 PM

## 2019-04-10 ENCOUNTER — CLINICAL SUPPORT (OUTPATIENT)
Dept: CARDIOLOGY | Facility: CLINIC | Age: 79
End: 2019-04-10

## 2019-04-10 DIAGNOSIS — I48.21 PERMANENT ATRIAL FIBRILLATION (HCC): ICD-10-CM

## 2019-04-10 DIAGNOSIS — Z95.0 PACEMAKER: ICD-10-CM

## 2019-04-10 DIAGNOSIS — I49.5 SSS (SICK SINUS SYNDROME) (HCC): Primary | ICD-10-CM

## 2019-04-10 PROCEDURE — 93288 INTERROG EVL PM/LDLS PM IP: CPT | Performed by: NURSE PRACTITIONER

## 2019-04-10 NOTE — PROGRESS NOTES
Pacemaker Evaluation Report    April 10, 2019    Primary Cardiologist: Dr. Alejandro  Implanting MD: Dr. Elam  :[employer] Model: Accent DF RF 2210 Serial Number: [id number]  Implant date: 2/12/2014    Reason for evaluation:routine  Office  PPM  Cardiac device indication(s): sick sinus syndrome    Battery  REINA: 4.6-6.2 years    2.89V   Last charge: 0    Interrogation Results  Atrial sensing: P wave: 2.4 mV  Atrial capture: Not captured  Atrial lead impedance: 300 ohms  Ventricular sensing: R wave: 7.2 mV  Ventricular capture: 0.75 V @ 0.5 ms  Ventricular lead impedance: right  600 ohms    Parameters  Mode: DDDR  Base Rate: 65/120    Diagnostic Data  Atrial paced: <1 %   Ventricular paced: 50 %  Mode switch: 100%  AT/AF [place]: >99%  AHR:0  VHR:0    Changes made: No changes made    Conclusions: normal device function    Assessment:  1. SSS (sick sinus syndrome) (CMS/HCC)    2. Pacemaker    3. Permanent atrial fibrillation (CMS/HCC)    - coumadin          This document has been electronically signed by MICKIE Hogan on April 10, 2019 4:22 PM

## 2019-04-18 ENCOUNTER — ANTICOAGULATION VISIT (OUTPATIENT)
Dept: CARDIAC SURGERY | Facility: CLINIC | Age: 79
End: 2019-04-18

## 2019-04-18 DIAGNOSIS — I48.21 PERMANENT ATRIAL FIBRILLATION (HCC): ICD-10-CM

## 2019-04-18 DIAGNOSIS — Z79.01 LONG TERM CURRENT USE OF ANTICOAGULANT THERAPY: ICD-10-CM

## 2019-04-18 LAB — INR PPP: 3.3

## 2019-04-18 NOTE — PROGRESS NOTES
Received above INR from pt's [employer] home meter. Spoke with pt who states diet is consistent. Denies any med changes, bleeding issues or s/s of blood clot. Adjusted pt's dose and instructed to increase vit k today. Patient instructed regarding medication; results given and questions answered. Nutritional counseling given.  Dietary factors affecting therapy addressed.  Patient instructed to monitor for excessive bruising or bleeding. PT instructed to recheck INR on 4/25. PT verbalizes understanding.         This document has been electronically signed by MARICARMEN Moser @ on April 18, 2019 10:36 AM

## 2019-04-23 RX ORDER — ISOSORBIDE MONONITRATE 30 MG/1
TABLET, EXTENDED RELEASE ORAL
Qty: 30 TABLET | Refills: 5 | Status: SHIPPED | OUTPATIENT
Start: 2019-04-23 | End: 2019-10-22 | Stop reason: SDUPTHER

## 2019-04-25 ENCOUNTER — ANTICOAGULATION VISIT (OUTPATIENT)
Dept: CARDIAC SURGERY | Facility: CLINIC | Age: 79
End: 2019-04-25

## 2019-04-25 DIAGNOSIS — I48.21 PERMANENT ATRIAL FIBRILLATION (HCC): ICD-10-CM

## 2019-04-25 DIAGNOSIS — Z79.01 LONG TERM CURRENT USE OF ANTICOAGULANT THERAPY: ICD-10-CM

## 2019-04-25 LAB — INR PPP: 2.3

## 2019-04-25 NOTE — PROGRESS NOTES
Received today's INR of 2.3 from pt's [employer] Home Meter. Spoke with pt who denies any med changes, bleeding issues, or s/s of blood clot. Instructed to continue same dose and recheck INR on 5/2. Patient instructed regarding medication; results given and questions answered. Nutritional counseling given.  Dietary factors affecting therapy addressed.  Patient instructed to monitor for excessive bruising or bleeding. PT verbalizes understanding.         This document has been electronically signed by MARICARMEN Moser @ on April 25, 2019 10:25 AM

## 2019-05-03 ENCOUNTER — ANTICOAGULATION VISIT (OUTPATIENT)
Dept: CARDIAC SURGERY | Facility: CLINIC | Age: 79
End: 2019-05-03

## 2019-05-03 DIAGNOSIS — Z79.01 LONG TERM CURRENT USE OF ANTICOAGULANT THERAPY: ICD-10-CM

## 2019-05-03 DIAGNOSIS — I48.21 PERMANENT ATRIAL FIBRILLATION (HCC): ICD-10-CM

## 2019-05-03 LAB — INR PPP: 2.8

## 2019-05-03 NOTE — PROGRESS NOTES
I received INR via phone from pt reporting home meter reading. Pt denies med changes or bleeding problems. Pt dose was verified and pt was instructed to continue current dose and recheck on Thursday May 16; pt repeated back correctly. Patient instructed regarding medication; results given and questions answered. Nutritional counseling given.  Dietary factors affecting therapy addressed.  Patient instructed to monitor for excessive bruising or bleeding.         This document has been electronically signed by MARICARMEN Moser @ on May 3, 2019 9:06 AM

## 2019-05-13 RX ORDER — METOPROLOL SUCCINATE 50 MG/1
TABLET, EXTENDED RELEASE ORAL
Qty: 180 TABLET | Refills: 3 | Status: SHIPPED | OUTPATIENT
Start: 2019-05-13 | End: 2020-05-12 | Stop reason: SDUPTHER

## 2019-05-23 ENCOUNTER — ANTICOAGULATION VISIT (OUTPATIENT)
Dept: CARDIAC SURGERY | Facility: CLINIC | Age: 79
End: 2019-05-23

## 2019-05-23 DIAGNOSIS — I48.21 PERMANENT ATRIAL FIBRILLATION (HCC): ICD-10-CM

## 2019-05-23 DIAGNOSIS — Z79.01 LONG TERM CURRENT USE OF ANTICOAGULANT THERAPY: ICD-10-CM

## 2019-05-23 LAB — INR PPP: 2.6

## 2019-05-23 NOTE — PROGRESS NOTES
Today's INR is 2.6. Pt called in and reported overdue INR from [employer] home meter. Pt states she did take an antibiotic the week before last but denied other med changes or bleeding problems. Pt verified dose and I instructed her to continue current dose and retest on Thursday June 20; pt repeated back correctly. Patient instructed regarding medication; results given and questions answered. Nutritional counseling given.  Dietary factors affecting therapy addressed.  Patient instructed to monitor for excessive bruising or bleeding.         This document has been electronically signed by MARICARMEN Moser @ on May 23, 2019 8:20 AM

## 2019-06-04 ENCOUNTER — DOCUMENTATION (OUTPATIENT)
Dept: CARDIAC SURGERY | Facility: CLINIC | Age: 79
End: 2019-06-04

## 2019-06-20 ENCOUNTER — ANTICOAGULATION VISIT (OUTPATIENT)
Dept: CARDIAC SURGERY | Facility: CLINIC | Age: 79
End: 2019-06-20

## 2019-06-20 DIAGNOSIS — I48.21 PERMANENT ATRIAL FIBRILLATION (HCC): ICD-10-CM

## 2019-06-20 DIAGNOSIS — Z79.01 LONG TERM CURRENT USE OF ANTICOAGULANT THERAPY: ICD-10-CM

## 2019-06-20 LAB — INR PPP: 2.5

## 2019-06-20 NOTE — PROGRESS NOTES
Received today's INR of 2.5 from pt's [facility]. Spoke with pt who states she is on IV Rocephin and will take dose 4 today. PT will most likely have dose of Rocephin tomorrow and then switch to an unknown PO med. Denies any other med changes or bleeding issues. Instructed pt to increase vit k today. Will leave pt on dose at this time and pt will call CC with name of PO AB. Will instruct pt on recheck date at that time. Patient instructed regarding medication; results given and questions answered. Nutritional counseling given.  Dietary factors affecting therapy addressed.  Patient instructed to monitor for excessive bruising or bleeding. PT verbalizes understanding.         This document has been electronically signed by Jeanne Centeno, MARICARMEN @ on June 20, 2019 8:55 AM

## 2019-06-24 ENCOUNTER — DOCUMENTATION (OUTPATIENT)
Dept: CARDIAC SURGERY | Facility: CLINIC | Age: 79
End: 2019-06-24

## 2019-06-24 NOTE — PROGRESS NOTES
Pt called to say she started Ceftin on Saturday for 5 days for respiratory infection.  Adjusted today's coumadin dose and instructed pt to self-test on Wednesday the 26th.  Pt verbalized.

## 2019-06-26 ENCOUNTER — ANTICOAGULATION VISIT (OUTPATIENT)
Dept: CARDIAC SURGERY | Facility: CLINIC | Age: 79
End: 2019-06-26

## 2019-06-26 DIAGNOSIS — Z79.01 LONG TERM CURRENT USE OF ANTICOAGULANT THERAPY: ICD-10-CM

## 2019-06-26 DIAGNOSIS — I48.21 PERMANENT ATRIAL FIBRILLATION (HCC): ICD-10-CM

## 2019-06-26 LAB — INR PPP: 1.6

## 2019-06-26 NOTE — PROGRESS NOTES
Today's INR is 1.6.   Pt called self-test INR in today.  Pt has completed AB.  Pt denies bleeding issues.  Adjusted today's coumadin dose and instructed pt to limit green intake for three days.  Pt will resume usual coumadin dose following today.  Recheck INR in two weeks.  Pt verbalized instructions.  Patient instructed regarding medication; results given and questions answered. Nutritional counseling given.  Dietary factors affecting therapy addressed.  Patient instructed to monitor for excessive bruising or bleeding.        This document has been electronically signed by MARICARMEN Moser @ on June 26, 2019 8:54 AM

## 2019-07-10 ENCOUNTER — ANTICOAGULATION VISIT (OUTPATIENT)
Dept: CARDIAC SURGERY | Facility: CLINIC | Age: 79
End: 2019-07-10

## 2019-07-10 DIAGNOSIS — I48.21 PERMANENT ATRIAL FIBRILLATION (HCC): ICD-10-CM

## 2019-07-10 DIAGNOSIS — Z79.01 LONG TERM CURRENT USE OF ANTICOAGULANT THERAPY: ICD-10-CM

## 2019-07-10 LAB — INR PPP: 2.2

## 2019-07-10 NOTE — PROGRESS NOTES
Today's INR is 2.2. Pt called and reported INR from [facility] meter. Pt denies med changes or bleeding problems. Dose confirmed with patient and pt instructed to retest on Wednesday August 7; pt verbalized. Patient instructed regarding medication; results given and questions answered. Nutritional counseling given.  Dietary factors affecting therapy addressed.  Patient instructed to monitor for excessive bruising or bleeding.       This document has been electronically signed by Fei Downing AGACNP-BC @  On July 10, 2019 8:52 AM

## 2019-07-30 ENCOUNTER — OFFICE VISIT (OUTPATIENT)
Dept: CARDIOLOGY | Facility: CLINIC | Age: 79
End: 2019-07-30

## 2019-07-30 VITALS
DIASTOLIC BLOOD PRESSURE: 66 MMHG | OXYGEN SATURATION: 99 % | WEIGHT: 166 LBS | BODY MASS INDEX: 25.16 KG/M2 | SYSTOLIC BLOOD PRESSURE: 112 MMHG | HEIGHT: 68 IN | HEART RATE: 94 BPM

## 2019-07-30 DIAGNOSIS — I48.21 PERMANENT ATRIAL FIBRILLATION (HCC): ICD-10-CM

## 2019-07-30 DIAGNOSIS — E11.9 TYPE 2 DIABETES MELLITUS WITHOUT COMPLICATION, WITHOUT LONG-TERM CURRENT USE OF INSULIN (HCC): ICD-10-CM

## 2019-07-30 DIAGNOSIS — E78.2 MIXED HYPERLIPIDEMIA: ICD-10-CM

## 2019-07-30 DIAGNOSIS — I25.119 CORONARY ARTERY DISEASE INVOLVING NATIVE CORONARY ARTERY OF NATIVE HEART WITH ANGINA PECTORIS (HCC): Primary | ICD-10-CM

## 2019-07-30 DIAGNOSIS — I10 ESSENTIAL HYPERTENSION: ICD-10-CM

## 2019-07-30 PROCEDURE — 99214 OFFICE O/P EST MOD 30 MIN: CPT | Performed by: INTERNAL MEDICINE

## 2019-07-30 NOTE — PROGRESS NOTES
[facility] Cardiology  OFFICE NOTE    Kirsten Rosales  79 y.o. female    07/30/2019  1. Coronary artery disease involving native coronary artery of native heart with angina pectoris (CMS/HCC)    2. Mixed hyperlipidemia    3. Essential hypertension    4. Permanent atrial fibrillation (CMS/HCC)    5. Type 2 diabetes mellitus without complication, without long-term current use of insulin (CMS/HCC)        Chief complaint -Follow-up CAD      History of present Illness- 79-year-old lady with history of coronary disease prior stent placement in 2005, has chronic atrial fibrillation with sick sinus syndrome on warfarin for anticoagulation.  She is doing well no chest pain or shortness of breath she used to exercise regularly she hasn't done it for about a month.  Her most recent A1c was 6.5 in 10/ 2018.  Scheduled for for blood work.  she has her pacemaker checked regularly through the pacemaker clinic.  She denies any GI symptoms or CNS symptoms.  She had a respiratory infection possibly from her bronchiectasis and she was on antibiotics last month and currently had to go back on antibiotics again due to sore throat and upper respiratory infection.              Allergies   Allergen Reactions   • Aspirin Hives   • Celebrex [Celecoxib] Hives   • Rythmol [Propafenone] Nausea And Vomiting         Past Medical History:   Diagnosis Date   • Atherosclerotic heart disease of native coronary artery with unspecified angina pectoris (CMS/HCC)    • Atrial fibrillation (CMS/HCC)    • Breath shortness    • CAD (coronary artery disease)    • Hyperlipidemia    • Long term (current) use of anticoagulants    • Sick sinus syndrome (CMS/HCC)    • Unspecified hypothyroidism          Past Surgical History:   Procedure Laterality Date   • BACK SURGERY     • CARDIAC CATHETERIZATION      2/2016   • CARDIAC PACEMAKER PLACEMENT     • CARDIOVERSION     • CATARACT EXTRACTION W/ INTRAOCULAR LENS IMPLANT Right 3/16/2018     Procedure: CATARACT PHACO EXTRACTION WITH INTRAOCULAR LENS IMPLANT;  Surgeon: Umesh Thibodeaux MD;  Location: [facility], [place] OR;  Service: Ophthalmology   • CATARACT EXTRACTION W/ INTRAOCULAR LENS IMPLANT Left 3/23/2018    Procedure: CATARACT PHACO EXTRACTION WITH INTRAOCULAR LENS IMPLANT;  Surgeon: Umesh Thibodeaux MD;  Location: [facility], [place] OR;  Service: Ophthalmology   • COLONOSCOPY N/A 1/4/2018    Procedure: COLONOSCOPY;  Surgeon: Albino Juan DO;  Location: [facility], [place] ENDOSCOPY;  Service:    • CORONARY ANGIOPLASTY     • PACEMAKER IMPLANTATION           History reviewed. No pertinent family history.      Social History     Socioeconomic History   • Marital status:      Spouse name: Not on file   • Number of children: Not on file   • Years of education: Not on file   • Highest education level: Not on file   Tobacco Use   • Smoking status: Former Smoker   • Smokeless tobacco: Never Used   • Tobacco comment: man 30 years ago   Substance and Sexual Activity   • Alcohol use: No   • Drug use: No   • Sexual activity: Defer         Current Outpatient Medications   Medication Sig Dispense Refill   • docusate sodium (COLACE) 100 MG capsule Take 100 mg by mouth 2 (Two) Times a Day.     • isosorbide mononitrate (IMDUR) 30 MG 24 hr tablet TAKE ONE TABLET BY MOUTH DAILY 30 tablet 5   • levothyroxine (SYNTHROID, LEVOTHROID) 112 MCG tablet Take 112 mcg by mouth Daily.     • Melatonin 10 MG tablet Take 20 mg by mouth Every Night.     • metFORMIN (GLUCOPHAGE) 500 MG tablet Take 500 mg by mouth 2 (Two) Times a Day.     • metoprolol succinate XL (TOPROL-XL) 50 MG 24 hr tablet TAKE ONE TABLET BY MOUTH TWICE A  tablet 3   • mirtazapine (REMERON) 7.5 MG half tablet Take 7.5 mg by mouth Every Night.     • Omega-3 Fatty Acids (OMEGA 3 PO) Take 2,000 mg by mouth Daily.     • pravastatin (PRAVACHOL) 40 MG tablet TAKE ONE TABLET BY MOUTH DAILY 90 tablet 1   • Ubiquinone (ULTRA COQ10 PO) Take 1 tablet by mouth Daily.     •  "warfarin (COUMADIN) 5 MG tablet Take 1 tablet nightly OR as directed by coumadin clinic 30 tablet 5     No current facility-administered medications for this visit.          Review of Systems     Constitution: Denies any fatigue, fever or chills    HENT: Denies any headache, hearing impairment,     Eyes: Denies any blurring of vision, or photophobia     Cardivascular - As per history of present illness     Respiratory system-Shortness of breath NYHA class II   sleep apnea.     Endocrine:  history of hyperlipidemia, diabetes,                      Hypothyrodism       Musculoskeletal:   history of arthritis with musculoskeletal problems    Gastrointestinal: No nausea, vomiting, or melena    Genitourinary: No dysuria or hematuria    Neurological:   No history of seizure disorder, stroke, memory problems    Psychiatric/Behavioral:        No history of depression    Hematological- no history of easy bruising or any bleeding diathesis            OBJECTIVE    /66   Pulse 94   Ht 172.5 cm (67.9\")   Wt 75.3 kg (166 lb)   SpO2 99%   BMI 25.31 kg/m²       Physical Exam     Constitutional: is oriented to person, place, and time.     Skin-warm and dry    Well developed and nourished in no acute distress      Head: Normocephalic and atraumatic.     Eyes: Pupils are equal    Neck: Neck supple. No bruit in the carotids    Cardiovascular: Maurice in the fifth intercostal space   Regular rate, and  Rhythm,    S1 greater than S2,     Pulmonary/Chest:   Air  Entry is equal on both sides  No wheezing or crackles,      Abdominal: Soft.  No hepatosplenomegaly, bowel sounds are present    Musculoskeletal: No kyphoscoliosis, no significant thickening of the joints    Neurological: is alert and oriented to person, place, and time.    cranial nerve are intact .   No motor or sensory deficit    Extremities-no edema, no radial femoral delay      Psychiatric: He has a normal mood and affect.                  His behavior is normal. "           Procedures      Lab Results   Component Value Date    WBC 6.45 07/06/2018    HGB 14.5 07/06/2018    HCT 42.8 07/06/2018    MCV 88.6 07/06/2018     07/06/2018     Lab Results   Component Value Date    GLUCOSE 102 (H) 07/06/2018    BUN 18 07/06/2018    CREATININE 0.68 07/06/2018    EGFRIFNONA 83 07/06/2018    BCR 26.5 (H) 07/06/2018    CO2 27.0 07/06/2018    CALCIUM 9.3 07/06/2018    ALBUMIN 4.20 07/06/2018    AST 49 (H) 07/06/2018    ALT 26 07/06/2018     Lab Results   Component Value Date    CHOL 152 07/06/2018    CHOL 135 07/07/2017     Lab Results   Component Value Date    TRIG 167 07/06/2018    TRIG 159 07/07/2017    TRIG 138 02/09/2016     Lab Results   Component Value Date    HDL 46 (L) 07/06/2018    HDL 52 (L) 07/07/2017     No components found for: LDLCALC  Lab Results   Component Value Date    LDL 38 07/06/2018    LDL 32 07/07/2017    LDL 68 02/09/2016     No results found for: HDLLDLRATIO  No components found for: CHOLHDL  Lab Results   Component Value Date    HGBA1C 6.4 (H) 05/10/2019     Lab Results   Component Value Date    TSH 0.960 07/06/2018                  A/P  CAD prior stent placement doing well no chest pain.  She takes Imdur for mild angina and she is doing tolerating the isosorbide 30 mg daily, I asked her to exercise regularly    Chronic atrial fibrillation with sick sinus syndrome status post pacemaker on warfarin and rate controlled with Toprol-XL.    Hyperlipidemia on pravastatin she takes it every other day due to myalgia.  We will do all the blood work    Diabetes on metformin and A1c is good.    Hypothyroidism on Synthroid supplements.    Follow-up in 6 months with Dr. Myers          This document has been electronically signed by Nadeem Alejandro MD on July 30, 2019 3:36 PM       EMR Dragon/Transcription disclaimer:   Some of this note may be an electronic transcription/translation of spoken language to printed text. The electronic translation of spoken language  may permit erroneous, or at times, nonsensical words or phrases to be inadvertently transcribed; Although I have reviewed the note for such errors, some may still exist.

## 2019-08-06 RX ORDER — PRAVASTATIN SODIUM 40 MG
40 TABLET ORAL DAILY
Qty: 90 TABLET | Refills: 3 | Status: SHIPPED | OUTPATIENT
Start: 2019-08-06 | End: 2020-11-04 | Stop reason: SDUPTHER

## 2019-08-08 ENCOUNTER — ANTICOAGULATION VISIT (OUTPATIENT)
Dept: CARDIAC SURGERY | Facility: CLINIC | Age: 79
End: 2019-08-08

## 2019-08-08 DIAGNOSIS — I48.21 PERMANENT ATRIAL FIBRILLATION (HCC): ICD-10-CM

## 2019-08-08 DIAGNOSIS — Z79.01 LONG TERM CURRENT USE OF ANTICOAGULANT THERAPY: ICD-10-CM

## 2019-08-08 LAB — INR PPP: 2.3 (ref 0.9–1.1)

## 2019-08-08 PROCEDURE — 85610 PROTHROMBIN TIME: CPT | Performed by: NURSE PRACTITIONER

## 2019-08-08 NOTE — PROGRESS NOTES
Received INR of 2.3 from pt's [facility]. Spoke with pt who is overdue for INR. PT has been on Doxycyline for 10 days which she is finishing today but did not call CC. PT instructed to always call CC when meds are changed. PT instructed to continue same dose and Coumadin friendly diet. Recheck INR on 9/5. Patient instructed regarding medication; results given and questions answered. Nutritional counseling given.  Dietary factors affecting therapy addressed.  Patient instructed to monitor for excessive bruising or bleeding. PT verbalizes understanding.       This document has been electronically signed by ELIZABETH Cano-BC @  On August 8, 2019 9:38 AM

## 2019-08-09 ENCOUNTER — APPOINTMENT (OUTPATIENT)
Dept: LAB | Facility: HOSPITAL | Age: 79
End: 2019-08-09

## 2019-08-09 LAB
ALBUMIN SERPL-MCNC: 4.2 G/DL (ref 3.5–5.2)
ALBUMIN/GLOB SERPL: 1.6 G/DL
ALP SERPL-CCNC: 87 U/L (ref 39–117)
ALT SERPL W P-5'-P-CCNC: 13 U/L (ref 1–33)
ANION GAP SERPL CALCULATED.3IONS-SCNC: 10 MMOL/L (ref 5–15)
AST SERPL-CCNC: 50 U/L (ref 1–32)
BILIRUB SERPL-MCNC: 0.2 MG/DL (ref 0.2–1.2)
BUN BLD-MCNC: 21 MG/DL (ref 8–23)
BUN/CREAT SERPL: 26.6 (ref 7–25)
CALCIUM SPEC-SCNC: 9.3 MG/DL (ref 8.6–10.5)
CHLORIDE SERPL-SCNC: 97 MMOL/L (ref 98–107)
CHOLEST SERPL-MCNC: 135 MG/DL (ref 0–200)
CO2 SERPL-SCNC: 28 MMOL/L (ref 22–29)
CREAT BLD-MCNC: 0.79 MG/DL (ref 0.57–1)
DEPRECATED RDW RBC AUTO: 47.2 FL (ref 37–54)
ERYTHROCYTE [DISTWIDTH] IN BLOOD BY AUTOMATED COUNT: 14.4 % (ref 12.3–15.4)
GFR SERPL CREATININE-BSD FRML MDRD: 70 ML/MIN/1.73
GLOBULIN UR ELPH-MCNC: 2.6 GM/DL
GLUCOSE BLD-MCNC: 100 MG/DL (ref 65–99)
HBA1C MFR BLD: 6.3 % (ref 4.8–5.6)
HCT VFR BLD AUTO: 44 % (ref 34–46.6)
HDLC SERPL-MCNC: 43 MG/DL (ref 40–60)
HGB BLD-MCNC: 14.2 G/DL (ref 12–15.9)
LDLC SERPL CALC-MCNC: 59 MG/DL (ref 0–100)
LDLC/HDLC SERPL: 1.37 {RATIO}
MCH RBC QN AUTO: 28.9 PG (ref 26.6–33)
MCHC RBC AUTO-ENTMCNC: 32.3 G/DL (ref 31.5–35.7)
MCV RBC AUTO: 89.6 FL (ref 79–97)
PLATELET # BLD AUTO: 412 10*3/MM3 (ref 140–450)
PMV BLD AUTO: 10.5 FL (ref 6–12)
POTASSIUM BLD-SCNC: 4.8 MMOL/L (ref 3.5–5.2)
PROT SERPL-MCNC: 6.8 G/DL (ref 6–8.5)
RBC # BLD AUTO: 4.91 10*6/MM3 (ref 3.77–5.28)
SODIUM BLD-SCNC: 135 MMOL/L (ref 136–145)
T4 FREE SERPL-MCNC: 1.62 NG/DL (ref 0.93–1.7)
TRIGL SERPL-MCNC: 166 MG/DL (ref 0–150)
TSH SERPL DL<=0.05 MIU/L-ACNC: 1.12 MIU/ML (ref 0.27–4.2)
VLDLC SERPL-MCNC: 33.2 MG/DL (ref 5–40)
WBC NRBC COR # BLD: 5.49 10*3/MM3 (ref 3.4–10.8)

## 2019-08-09 PROCEDURE — 85027 COMPLETE CBC AUTOMATED: CPT | Performed by: INTERNAL MEDICINE

## 2019-08-09 PROCEDURE — 83036 HEMOGLOBIN GLYCOSYLATED A1C: CPT | Performed by: INTERNAL MEDICINE

## 2019-08-09 PROCEDURE — 36415 COLL VENOUS BLD VENIPUNCTURE: CPT | Performed by: INTERNAL MEDICINE

## 2019-08-09 PROCEDURE — 80053 COMPREHEN METABOLIC PANEL: CPT | Performed by: INTERNAL MEDICINE

## 2019-08-09 PROCEDURE — 84443 ASSAY THYROID STIM HORMONE: CPT | Performed by: INTERNAL MEDICINE

## 2019-08-09 PROCEDURE — 84439 ASSAY OF FREE THYROXINE: CPT | Performed by: INTERNAL MEDICINE

## 2019-08-09 PROCEDURE — 80061 LIPID PANEL: CPT | Performed by: INTERNAL MEDICINE

## 2019-08-15 ENCOUNTER — DOCUMENTATION (OUTPATIENT)
Dept: CARDIOLOGY | Facility: CLINIC | Age: 79
End: 2019-08-15

## 2019-09-06 ENCOUNTER — ANTICOAGULATION VISIT (OUTPATIENT)
Dept: CARDIAC SURGERY | Facility: CLINIC | Age: 79
End: 2019-09-06

## 2019-09-06 VITALS — OXYGEN SATURATION: 95 % | SYSTOLIC BLOOD PRESSURE: 102 MMHG | DIASTOLIC BLOOD PRESSURE: 78 MMHG | HEART RATE: 79 BPM

## 2019-09-06 DIAGNOSIS — Z79.01 LONG TERM CURRENT USE OF ANTICOAGULANT THERAPY: ICD-10-CM

## 2019-09-06 DIAGNOSIS — I48.21 PERMANENT ATRIAL FIBRILLATION (HCC): ICD-10-CM

## 2019-09-06 LAB — INR PPP: 2.1 (ref 0.9–1.1)

## 2019-09-06 PROCEDURE — 85610 PROTHROMBIN TIME: CPT | Performed by: NURSE PRACTITIONER

## 2019-09-06 PROCEDURE — 99211 OFF/OP EST MAY X REQ PHY/QHP: CPT | Performed by: NURSE PRACTITIONER

## 2019-09-06 NOTE — PROGRESS NOTES
Today's INR is 2.1. PT was checked in office due to running out of strips for [facility] home meter. PT denies any med changes, bleeding issues or s/s of blood clot. PT instructed to continue same dose and will recheck INR with home meter on 10/3. Patient instructed regarding medication; results given and questions answered. Nutritional counseling given.  Dietary factors affecting therapy addressed.  Patient instructed to monitor for excessive bruising or bleeding. PT verbalizes understanding.       This document has been electronically signed by ELIZABETH Cano-BC @  On September 6, 2019 11:46 AM

## 2019-10-09 ENCOUNTER — ANTICOAGULATION VISIT (OUTPATIENT)
Dept: CARDIAC SURGERY | Facility: CLINIC | Age: 79
End: 2019-10-09

## 2019-10-09 ENCOUNTER — CLINICAL SUPPORT (OUTPATIENT)
Dept: CARDIOLOGY | Facility: CLINIC | Age: 79
End: 2019-10-09

## 2019-10-09 VITALS — OXYGEN SATURATION: 98 % | HEART RATE: 75 BPM

## 2019-10-09 DIAGNOSIS — I48.21 PERMANENT ATRIAL FIBRILLATION (HCC): ICD-10-CM

## 2019-10-09 DIAGNOSIS — Z95.0 PACEMAKER: ICD-10-CM

## 2019-10-09 DIAGNOSIS — I49.5 SSS (SICK SINUS SYNDROME) (HCC): Primary | ICD-10-CM

## 2019-10-09 DIAGNOSIS — Z79.01 LONG TERM CURRENT USE OF ANTICOAGULANT THERAPY: ICD-10-CM

## 2019-10-09 LAB — INR PPP: 2.3 (ref 0.9–1.1)

## 2019-10-09 PROCEDURE — 85610 PROTHROMBIN TIME: CPT | Performed by: NURSE PRACTITIONER

## 2019-10-09 PROCEDURE — 93288 INTERROG EVL PM/LDLS PM IP: CPT | Performed by: NURSE PRACTITIONER

## 2019-10-09 NOTE — PROGRESS NOTES
Today's INR is 2.3.  Pt home meter not working so she came in office today. We attempted to fix meter but it will not turn on even with new batteries. Pt states she will contact [facility] for home meter issues. Pt was instructed to continue current dose and self test on Wednesday November 6 or call CC for an appointment if needed; she repeated back correctly. Patient instructed regarding medication; results given and questions answered. Nutritional counseling given.  Dietary factors affecting therapy addressed.  Patient instructed to monitor for excessive bruising or bleeding.       This document has been electronically signed by ELIZABETH Cano-BC Liz  On October 9, 2019 12:59 PM

## 2019-10-09 NOTE — PROGRESS NOTES
Pacemaker Evaluation Report    October 9, 2019    Primary Cardiologist: Dr. Alejandro  Implanting MD: Dr. Elam  :[employer] Model: Accent DF [id number] Serial Number: [id number]  Implant date: 2/12/2014    Reason for evaluation:routine  Office  PPM  Cardiac device indication(s): sick sinus syndrome    Battery  REINA: 4.2-5.6 years  Last charge: 0    Interrogation Results  Atrial sensing: P wave: 3.9mV  Atrial capture: A fib  Atrial lead impedance: 330 ohms  Ventricular sensing: R wave: 7.7mV  Ventricular capture: 0.75 V @ 0.5 ms  Ventricular lead impedance: right  630 ohms    Parameters  Mode: DDDR  Base Rate: 65/120    Diagnostic Data  Atrial paced: 0%   Ventricular paced: 44 %  Mode switch: 100%  AT/AF [place]: 100%  AHR:0  VHR:3    Intrinsic rate: 76    Changes made: No changes made    Conclusions: normal device function    Assessment:  1. SSS (sick sinus syndrome) (CMS/HCC)    2. Pacemaker    - coumadin          This document has been electronically signed by MICKIE Hogan on October 9, 2019 3:23 PM

## 2019-10-22 RX ORDER — ISOSORBIDE MONONITRATE 30 MG/1
30 TABLET, EXTENDED RELEASE ORAL DAILY
Qty: 30 TABLET | Refills: 5 | Status: SHIPPED | OUTPATIENT
Start: 2019-10-22 | End: 2020-04-16

## 2019-11-07 ENCOUNTER — ANTICOAGULATION VISIT (OUTPATIENT)
Dept: CARDIAC SURGERY | Facility: CLINIC | Age: 79
End: 2019-11-07

## 2019-11-07 DIAGNOSIS — Z79.01 LONG TERM CURRENT USE OF ANTICOAGULANT THERAPY: ICD-10-CM

## 2019-11-07 DIAGNOSIS — I48.21 PERMANENT ATRIAL FIBRILLATION (HCC): ICD-10-CM

## 2019-11-07 LAB — INR PPP: 2.9 (ref 0.9–1.1)

## 2019-11-07 PROCEDURE — 85610 PROTHROMBIN TIME: CPT | Performed by: NURSE PRACTITIONER

## 2019-11-07 NOTE — PROGRESS NOTES
Today's INR is 2.9. PT was seen in office due to issues setting up new meter. Denies any med changes or bleeding issues. Denies any s/s of blood clot. PT instructed to continue same dose and will recheck INR on 12/5 at home. Patient instructed regarding medication; results given and questions answered. Nutritional counseling given.  Dietary factors affecting therapy addressed.  Patient instructed to monitor for excessive bruising or bleeding. PT verbalizes understanding.       This document has been electronically signed by JACKELINE CanoCNP-BC @  On November 7, 2019 1:35 PM

## 2019-12-05 ENCOUNTER — ANTICOAGULATION VISIT (OUTPATIENT)
Dept: CARDIAC SURGERY | Facility: CLINIC | Age: 79
End: 2019-12-05

## 2019-12-05 DIAGNOSIS — I48.21 PERMANENT ATRIAL FIBRILLATION (HCC): ICD-10-CM

## 2019-12-05 DIAGNOSIS — Z79.01 LONG TERM CURRENT USE OF ANTICOAGULANT THERAPY: ICD-10-CM

## 2019-12-05 LAB — INR PPP: 3.4

## 2019-12-05 PROCEDURE — 93793 ANTICOAG MGMT PT WARFARIN: CPT | Performed by: NURSE PRACTITIONER

## 2019-12-05 NOTE — PROGRESS NOTES
Received INR of 3.4 from pt's [facility]. Spoke with pt who denies any med changes or bleeding issues. PT states diet is consistent. Adjusted pt's dose and instructed to increase vit k today. Recheck INR on 12/12. Patient instructed regarding medication; results given and questions answered. Nutritional counseling given.  Dietary factors affecting therapy addressed.  Patient instructed to monitor for excessive bruising or bleeding. PT repeated back correctly. Findings reported by Caty Truong RN.        This document has been electronically signed by ELIZABETH Cano-BC @  On December 5, 2019 11:29 AM

## 2019-12-12 ENCOUNTER — ANTICOAGULATION VISIT (OUTPATIENT)
Dept: CARDIAC SURGERY | Facility: CLINIC | Age: 79
End: 2019-12-12

## 2019-12-12 DIAGNOSIS — I48.21 PERMANENT ATRIAL FIBRILLATION (HCC): ICD-10-CM

## 2019-12-12 DIAGNOSIS — Z79.01 LONG TERM CURRENT USE OF ANTICOAGULANT THERAPY: ICD-10-CM

## 2019-12-12 LAB — INR PPP: 2

## 2019-12-12 PROCEDURE — 93793 ANTICOAG MGMT PT WARFARIN: CPT | Performed by: NURSE PRACTITIONER

## 2019-12-12 NOTE — PROGRESS NOTES
Today's INR is 2.0 which was received from pt's [facility]. Spoke with pt who denies any med changes or bleeding issues. PT did eat green salad x2. Due to rapid drop in INR, dose was increased back to previous dose and pt will continue eating green 2x per week. Recheck INR in 2 wks on 12/26. Patient instructed regarding medication; results given and questions answered. Nutritional counseling given.  Dietary factors affecting therapy addressed.  Patient instructed to monitor for excessive bruising or bleeding. PT verbalizes understanding. Findings reported by Caty Truong RN.         This document has been electronically signed by Jeanne Centeno, MARICARMEN @ on December 12, 2019 9:24 AM

## 2019-12-26 ENCOUNTER — ANTICOAGULATION VISIT (OUTPATIENT)
Dept: CARDIAC SURGERY | Facility: CLINIC | Age: 79
End: 2019-12-26

## 2019-12-26 DIAGNOSIS — Z79.01 LONG TERM CURRENT USE OF ANTICOAGULANT THERAPY: ICD-10-CM

## 2019-12-26 DIAGNOSIS — I48.21 PERMANENT ATRIAL FIBRILLATION (HCC): ICD-10-CM

## 2019-12-26 LAB — INR PPP: 2.2

## 2019-12-26 PROCEDURE — 93793 ANTICOAG MGMT PT WARFARIN: CPT | Performed by: NURSE PRACTITIONER

## 2019-12-26 NOTE — PROGRESS NOTES
Today's INR is 2.2. Pt called to report INR from home meter. Pt denies med changes or bleeding problems. Pt verified dose. Pt was instructed to continue current dose and retest INR on Thursday January 16; pt verbalized. Patient instructed regarding medication; results given and questions answered. Nutritional counseling given.  Dietary factors affecting therapy addressed.  Patient instructed to monitor for excessive bruising or bleeding. Findings reported by Annabelle Davis RN.          This document has been electronically signed by Jeanne Centeno, MARICARMEN @ on December 26, 2019 8:25 AM

## 2020-01-16 ENCOUNTER — ANTICOAGULATION VISIT (OUTPATIENT)
Dept: CARDIAC SURGERY | Facility: CLINIC | Age: 80
End: 2020-01-16

## 2020-01-16 DIAGNOSIS — I48.21 PERMANENT ATRIAL FIBRILLATION (HCC): ICD-10-CM

## 2020-01-16 DIAGNOSIS — Z79.01 LONG TERM CURRENT USE OF ANTICOAGULANT THERAPY: ICD-10-CM

## 2020-01-16 LAB — INR PPP: 2.1

## 2020-01-16 PROCEDURE — 93793 ANTICOAG MGMT PT WARFARIN: CPT | Performed by: NURSE PRACTITIONER

## 2020-01-16 NOTE — PROGRESS NOTES
Today's INR is 2.1.  Pt called to report INR  From [facility] meter. Pt denies med changes or bleeding problems. Pt confirmed current dose. Pt instructed to continue current dose and retest INR on Thursday February 13; pt repeated back correctly. Patient instructed regarding medication; results given and questions answered. Nutritional counseling given.  Dietary factors affecting therapy addressed.  Patient instructed to monitor for excessive bruising or bleeding. Findings reported by Annabelle Davis RN.          This document has been electronically signed by MARICARMEN Moser @ on January 16, 2020 9:52 AM

## 2020-02-07 RX ORDER — WARFARIN SODIUM 5 MG/1
TABLET ORAL
Qty: 30 TABLET | Refills: 5 | Status: SHIPPED | OUTPATIENT
Start: 2020-02-07 | End: 2020-12-09 | Stop reason: SDUPTHER

## 2020-02-10 DIAGNOSIS — I48.21 PERMANENT ATRIAL FIBRILLATION (HCC): Primary | ICD-10-CM

## 2020-02-11 ENCOUNTER — OFFICE VISIT (OUTPATIENT)
Dept: CARDIOLOGY | Facility: CLINIC | Age: 80
End: 2020-02-11

## 2020-02-11 VITALS
HEART RATE: 75 BPM | HEIGHT: 68 IN | BODY MASS INDEX: 25.98 KG/M2 | WEIGHT: 171.4 LBS | SYSTOLIC BLOOD PRESSURE: 140 MMHG | OXYGEN SATURATION: 96 % | DIASTOLIC BLOOD PRESSURE: 80 MMHG

## 2020-02-11 DIAGNOSIS — E78.2 MIXED HYPERLIPIDEMIA: Primary | ICD-10-CM

## 2020-02-11 DIAGNOSIS — I10 ESSENTIAL HYPERTENSION: ICD-10-CM

## 2020-02-11 DIAGNOSIS — I48.21 PERMANENT ATRIAL FIBRILLATION (HCC): ICD-10-CM

## 2020-02-11 PROCEDURE — 99214 OFFICE O/P EST MOD 30 MIN: CPT | Performed by: INTERNAL MEDICINE

## 2020-02-11 PROCEDURE — 93000 ELECTROCARDIOGRAM COMPLETE: CPT | Performed by: INTERNAL MEDICINE

## 2020-02-11 NOTE — PROGRESS NOTES
[facility] Cardiology  OFFICE NOTE    Cardiovascular Medicine  Jayda Myers M.D., RPVI         No referring provider defined for this encounter.    Thank you for asking me to see Kirsten Rosales for afib.    History of Present Illness  This is a 80 y.o. female with:    1. Coronary artery disease involving native coronary artery of native heart with angina pectoris (CMS/HCC)    2. Mixed hyperlipidemia    3. Essential hypertension    4. Permanent atrial fibrillation (CMS/HCC)    5. Type 2 diabetes mellitus without complication, without long-term current use of insulin (CMS/[facility])          Chief complaint -Follow-up CAD        History of present Illness- 80-year-old lady with history of coronary disease prior stent placement in 2005, has chronic atrial fibrillation with sick sinus syndrome on warfarin for anticoagulation.  She is doing well no chest pain or shortness of breath. she has her pacemaker checked regularly through the pacemaker clinic.  She denies any GI symptoms or CNS symptoms.       No acute issues since last visit Dr. Silver.  She occasionally has some fullness in her chest when she lays down at night however when she gets up and take a deep breath it resolves pain orthopnea or PND though.  She has been using 2 pillows.  No swelling  She has stable shortness of breath on exertion.  No chest pain.  Has been taking Coumadin without any bleeding problems.  Denying any palpitations.    Review of Systems - ROS  Constitution: Negative for weakness, weight gain and weight loss.   HENT: Negative for congestion.    Eyes: Negative for blurred vision.   Cardiovascular: As mentioned above  Respiratory: Negative for cough and hemoptysis.    Endocrine: Negative for polydipsia and polyuria.   Hematologic/Lymphatic: Negative for bleeding problem. Does not bruise/bleed easily.   Skin: Negative for flushing.   Musculoskeletal: Negative for neck pain and stiffness.   Gastrointestinal: Negative for  abdominal pain, diarrhea, jaundice, melena, nausea and vomiting.   Genitourinary: Negative for dysuria and hematuria.   Neurological: Negative for dizziness, focal weakness and numbness.   Psychiatric/Behavioral: Negative for altered mental status and depression.          All other systems were reviewed and were negative.    family history is not on file.     reports that she has quit smoking. She has never used smokeless tobacco. She reports that she does not drink alcohol or use drugs.    Allergies   Allergen Reactions   • Aspirin Hives   • Celebrex [Celecoxib] Hives   • Rythmol [Propafenone] Nausea And Vomiting         Current Outpatient Medications:   •  docusate sodium (COLACE) 100 MG capsule, Take 100 mg by mouth As Needed., Disp: , Rfl:   •  isosorbide mononitrate (IMDUR) 30 MG 24 hr tablet, Take 1 tablet by mouth Daily., Disp: 30 tablet, Rfl: 5  •  levothyroxine (SYNTHROID, LEVOTHROID) 112 MCG tablet, Take 112 mcg by mouth Daily., Disp: , Rfl:   •  Melatonin 10 MG tablet, Take 20 mg by mouth As Needed., Disp: , Rfl:   •  metFORMIN (GLUCOPHAGE) 500 MG tablet, Take 500 mg by mouth 2 (Two) Times a Day., Disp: , Rfl:   •  metoprolol succinate XL (TOPROL-XL) 50 MG 24 hr tablet, TAKE ONE TABLET BY MOUTH TWICE A DAY, Disp: 180 tablet, Rfl: 3  •  mirtazapine (REMERON) 7.5 MG half tablet, Take 7.5 mg by mouth Every Night., Disp: , Rfl:   •  Omega-3 Fatty Acids (OMEGA 3 PO), Take 2,000 mg by mouth Daily., Disp: , Rfl:   •  pravastatin (PRAVACHOL) 40 MG tablet, Take 1 tablet by mouth Daily., Disp: 90 tablet, Rfl: 3  •  Ubiquinone (ULTRA COQ10 PO), Take 1 tablet by mouth Daily., Disp: , Rfl:   •  warfarin (COUMADIN) 5 MG tablet, TAKE ONE TABLET BY MOUTH ONCE NIGHTLY OR AS DIRECTED BY THE COUMADIN CLINIC, Disp: 30 tablet, Rfl: 5    Physical Exam:  Vitals:    02/11/20 1024   BP: 140/80   BP Location: Left arm   Patient Position: Sitting   Cuff Size: Adult   Pulse: 75   SpO2: 96%   Weight: 77.7 kg (171 lb 6.4 oz)   Height:  "172.5 cm (67.91\")   PainSc: 0-No pain     Current Pain Level: none  Pulse Ox: Normal  on room air  General: alert, appears stated age and cooperative     Body Habitus: well-nourished    HEENT: Head: Normocephalic, no lesions, without obvious abnormality. No arcus senilis, xanthelasma or xanthomas.    Neuro: alert, oriented x3  Pulses: 2+ and symmetric  JVP: Volume/Pulsation: Normal.  Normal waveforms.   Appropriate inspiratory decrease.  No Kussmaul's. No Jaylin's.   Carotid Exam: no bruit normal pulsation bilaterally   Carotid Volume: normal.     Respirations: no increased work of breathing   Chest:  Normal    Pulmonary:Normal   Precordium: Normal impulses. P2 is not palpable.  RV Heave: absent  LV Heave: absent  [place]:  normal size and placement  Palpable S4: absent.  Heart rate: normal    Heart Rhythm: irregular     Heart Sounds: S1: normal  S2: normal  S3: absent   S4: absent  Opening Snap: absent    Pericardial Rub:  Absent: .    Abdomen:   Appearance: normal .  Palpation: Soft, non-tender to palpation, bowel sounds positive in all four quadrants; no guarding or rebound tenderness  Extremity: no edema.   LE Skin: no rashes  LE Hair:  normal  LE Pulses: well perfused with normal pulses in the distal extremities  Pallor on elevation: Absent. Rubor on dependency: None      DATA REVIEWED:     EKG. I personally reviewed and interpreted the EKG.  Atrial flutter with variable AV block.    ECG/EMG Results (all)     None        ---------------------------------------------------  TTE/DEENA:  Results for orders placed in visit on 01/02/18   Adult Transthoracic Echo Complete W/ Cont if Necessary Per Protocol    Narrative · Left ventricular wall thickness is consistent with mild concentric   hypertrophy.  · Left ventricular systolic function is normal. Estimated EF = 53%.  · Right ventricular cavity is borderline dilated.  · Left atrial cavity size is severely dilated.  · Mild mitral valve regurgitation is present  · Mild " tricuspid valve regurgitation is present.  · There is a moderate (1-2cm) pericardial effusion.  · No echocardiographic evidence of tamponade          --------------------------------------------------------------------------------------------------  LABS:     The CVD Risk score (Herbie et al., 2008) failed to calculate for the following reasons:    The 2008 CVD risk score is only valid for ages 30 to 74         Lab Results   Component Value Date    GLUCOSE 100 (H) 08/09/2019    BUN 21 08/09/2019    CREATININE 0.79 08/09/2019    EGFRIFNONA 70 08/09/2019    BCR 26.6 (H) 08/09/2019    K 4.8 08/09/2019    CO2 28.0 08/09/2019    CALCIUM 9.3 08/09/2019    ALBUMIN 4.20 08/09/2019    AST 50 (H) 08/09/2019    ALT 13 08/09/2019     Lab Results   Component Value Date    WBC 5.49 08/09/2019    HGB 14.2 08/09/2019    HCT 44.0 08/09/2019    MCV 89.6 08/09/2019     08/09/2019     Lab Results   Component Value Date    CHOL 135 08/09/2019    CHLPL 150 02/09/2016    TRIG 166 (H) 08/09/2019    HDL 43 08/09/2019    LDL 59 08/09/2019     Lab Results   Component Value Date    TSH 1.120 08/09/2019     Lab Results   Component Value Date    CKTOTAL 51 02/10/2014    CKMB 1.0 02/10/2014    TROPONINI <0.012 02/10/2014     Lab Results   Component Value Date    HGBA1C 6.30 (H) 08/09/2019     No results found for: DDIMER  Lab Results   Component Value Date    ALT 13 08/09/2019     Lab Results   Component Value Date    HGBA1C 6.30 (H) 08/09/2019    HGBA1C 6.4 (H) 05/10/2019    HGBA1C 6.6 (H) 10/23/2018     Lab Results   Component Value Date    CREATININE 0.79 08/09/2019     No results found for: IRON, TIBC, FERRITIN  Lab Results   Component Value Date    INR 2.10 01/16/2020    INR 2.20 12/26/2019    INR 2.00 12/12/2019    PROTIME 14.0 02/18/2016    PROTIME 15.2 02/14/2014    PROTIME 16.6 (H) 02/13/2014       Assessment/Plan     CAD prior stent placement doing well no chest pain.  She takes Imdur for mild angina and she is doing  tolerating the isosorbide 30 mg daily, I asked her to exercise regularly     Chronic atrial fibrillation with sick sinus syndrome status post pacemaker on warfarin and rate controlled with Toprol-XL.  I discussed options of atrial flutter ablation and Novacks however patient wants to continue medical therapy and Coumadin at this point.     Hyperlipidemia on pravastatin      Diabetes on metformin and A1c is good.     Hypothyroidism on Synthroid supplements      Prevention:  Patient's Body mass index is 26.13 kg/m². BMI is above normal parameters. Recommendations include: exercise counseling and nutrition counseling.      Kirsten Rosales  reports that she has quit smoking. She has never used smokeless tobacco..    AAA Screening:   Not needed    Return in about 9 months (around 11/11/2020).          This document has been electronically signed by Jayda Myers MD on February 11, 2020 11:07 AM

## 2020-02-13 ENCOUNTER — ANTICOAGULATION VISIT (OUTPATIENT)
Dept: CARDIAC SURGERY | Facility: CLINIC | Age: 80
End: 2020-02-13

## 2020-02-13 DIAGNOSIS — I48.21 PERMANENT ATRIAL FIBRILLATION (HCC): ICD-10-CM

## 2020-02-13 DIAGNOSIS — Z79.01 LONG TERM CURRENT USE OF ANTICOAGULANT THERAPY: ICD-10-CM

## 2020-02-13 LAB — INR PPP: 2.3

## 2020-02-13 PROCEDURE — 93793 ANTICOAG MGMT PT WARFARIN: CPT | Performed by: NURSE PRACTITIONER

## 2020-02-13 NOTE — PROGRESS NOTES
Received INR via pt's home meter. INR is 2.3. PT denies any med changes or bleeding issues. Denies any s/s of blood clot. PT instructed to continue same dose and Coumadin friendly diet. Recheck INR in 1 month. Patient instructed regarding medication; results given and questions answered. Nutritional counseling given.  Dietary factors affecting therapy addressed.  Patient instructed to monitor for excessive bruising or bleeding. PT verbalizes understanding. Findings reported by Caty Truong RN.         This document has been electronically signed by Jeanne Centeno, MARICARMEN @ on February 13, 2020 9:12 AM

## 2020-03-13 ENCOUNTER — ANTICOAGULATION VISIT (OUTPATIENT)
Dept: CARDIAC SURGERY | Facility: CLINIC | Age: 80
End: 2020-03-13

## 2020-03-13 VITALS — SYSTOLIC BLOOD PRESSURE: 125 MMHG | DIASTOLIC BLOOD PRESSURE: 70 MMHG | HEART RATE: 82 BPM | OXYGEN SATURATION: 95 %

## 2020-03-13 DIAGNOSIS — I48.21 PERMANENT ATRIAL FIBRILLATION (HCC): ICD-10-CM

## 2020-03-13 DIAGNOSIS — Z79.01 LONG TERM CURRENT USE OF ANTICOAGULANT THERAPY: ICD-10-CM

## 2020-03-13 LAB — INR PPP: 5.3 (ref 0.9–1.1)

## 2020-03-13 PROCEDURE — 99211 OFF/OP EST MAY X REQ PHY/QHP: CPT | Performed by: NURSE PRACTITIONER

## 2020-03-13 PROCEDURE — 85610 PROTHROMBIN TIME: CPT | Performed by: NURSE PRACTITIONER

## 2020-03-13 NOTE — PROGRESS NOTES
Today's INR is 5.3.  Pt here today after using last test strip at home which gave her an error code. Pt states she finished Medrol dose pack earlier this week. Pt was reminded to always call [facility] with med changes; pt verbalized. Dose adjusted and pt instructed to have a serving of spinach today; pt states she can eat a spinach salad and will eat broccoli tomorrow. Pt denies bleeding problems. Appt made for 3 days. Notify provider if you experience excessive bleeding from the nose, cuts, gums, rectum, urinary tract, or vagina. Reddish or brown urine or stool. Vomiting of blood or hemorrhoidal bleeding. If major injury occurs present to the Emergency Department. Patient instructed regarding medication; results given and questions answered. Nutritional counseling given.  Dietary factors affecting therapy addressed.  Patient instructed to monitor for excessive bruising or bleeding. Findings reported by Annabelle Davis RN.          This document has been electronically signed by Jeanne Centeno, MARICARMEN @ on March 13, 2020 10:28

## 2020-03-17 ENCOUNTER — ANTICOAGULATION VISIT (OUTPATIENT)
Dept: CARDIAC SURGERY | Facility: CLINIC | Age: 80
End: 2020-03-17

## 2020-03-17 VITALS — HEART RATE: 106 BPM | DIASTOLIC BLOOD PRESSURE: 60 MMHG | SYSTOLIC BLOOD PRESSURE: 124 MMHG

## 2020-03-17 DIAGNOSIS — I48.21 PERMANENT ATRIAL FIBRILLATION (HCC): ICD-10-CM

## 2020-03-17 DIAGNOSIS — Z79.01 LONG TERM CURRENT USE OF ANTICOAGULANT THERAPY: ICD-10-CM

## 2020-03-17 LAB — INR PPP: 1.2 (ref 0.9–1.1)

## 2020-03-17 PROCEDURE — 85610 PROTHROMBIN TIME: CPT | Performed by: NURSE PRACTITIONER

## 2020-03-17 NOTE — PROGRESS NOTES
Today's INR is 1.2.   Pt here today for recheck of elevated INR r/t steroids.  I rearranged coumadin dose and instructed pt to limit green intake for four days.  Pt will use home meter and self-test next Thursday, March 26th.  Patient instructed regarding medication; results given and questions answered. Nutritional counseling given.  Dietary factors affecting therapy addressed.  Patient instructed to monitor for excessive bruising or bleeding.  Pt verbalizes.    Findings reported by Baron Herrera RN.       This document has been electronically signed by VEGA Cano @  On March 17, 2020 13:16

## 2020-03-26 ENCOUNTER — ANTICOAGULATION VISIT (OUTPATIENT)
Dept: CARDIAC SURGERY | Facility: CLINIC | Age: 80
End: 2020-03-26

## 2020-03-26 DIAGNOSIS — Z79.01 LONG TERM CURRENT USE OF ANTICOAGULANT THERAPY: ICD-10-CM

## 2020-03-26 DIAGNOSIS — I48.21 PERMANENT ATRIAL FIBRILLATION (HCC): ICD-10-CM

## 2020-03-26 LAB — INR PPP: 3.1

## 2020-03-26 PROCEDURE — 93793 ANTICOAG MGMT PT WARFARIN: CPT | Performed by: NURSE PRACTITIONER

## 2020-03-26 NOTE — PROGRESS NOTES
Today's INR is 3.1 which was received from pt's [employer] home meter. Spoke with pt who denies any med changes, bleeding issues or s/s of blood clot. PT states diet has been consistent. Adjusted pt's dose and instructed to increase vit k today. Recheck INR on 4/2. Patient instructed regarding medication; results given and questions answered. Nutritional counseling given.  Dietary factors affecting therapy addressed.  Patient instructed to monitor for excessive bruising or bleeding. PT verbalizes understanding. Findings reported by Caty Truong RN.         This document has been electronically signed by MARICARMEN Moser @ on March 26, 2020 14:30

## 2020-04-02 ENCOUNTER — ANTICOAGULATION VISIT (OUTPATIENT)
Dept: CARDIAC SURGERY | Facility: CLINIC | Age: 80
End: 2020-04-02

## 2020-04-02 DIAGNOSIS — I48.21 PERMANENT ATRIAL FIBRILLATION (HCC): ICD-10-CM

## 2020-04-02 DIAGNOSIS — Z79.01 LONG TERM CURRENT USE OF ANTICOAGULANT THERAPY: ICD-10-CM

## 2020-04-02 LAB — INR PPP: 2.2

## 2020-04-02 PROCEDURE — 93793 ANTICOAG MGMT PT WARFARIN: CPT | Performed by: NURSE PRACTITIONER

## 2020-04-02 NOTE — PROGRESS NOTES
Received today's INR of 2.2 from pt's [employer] home meter. Spoke with pt who denies any medication changes or bleeding issues. Denies any s/s of blood clot. Instructed to continue same dose and recheck INR on 4/9. Patient instructed regarding medication; results given and questions answered. Nutritional counseling given.  Dietary factors affecting therapy addressed.  Patient instructed to monitor for excessive bruising or bleeding. PT verbalizes understanding. Findings reported by Caty Truong RN.         This document has been electronically signed by Jeanne Centeno, MARICARMEN @ on April 2, 2020 11:27

## 2020-04-07 ENCOUNTER — CLINICAL SUPPORT (OUTPATIENT)
Dept: CARDIOLOGY | Facility: CLINIC | Age: 80
End: 2020-04-07

## 2020-04-07 DIAGNOSIS — I49.5 SSS (SICK SINUS SYNDROME) (HCC): ICD-10-CM

## 2020-04-07 DIAGNOSIS — I48.21 PERMANENT ATRIAL FIBRILLATION (HCC): ICD-10-CM

## 2020-04-07 DIAGNOSIS — Z95.0 PACEMAKER: Primary | ICD-10-CM

## 2020-04-07 PROCEDURE — 93294 REM INTERROG EVL PM/LDLS PM: CPT | Performed by: NURSE PRACTITIONER

## 2020-04-07 NOTE — PROGRESS NOTES
Pacemaker Evaluation Report    April 10, 2020    Primary Cardiologist: Dr. Myers  Implanting MD: Dr. Elam  :[employer] Model: Accent DF RF 2210 Serial Number: [id number]  Implant date: 2/12/2014    Reason for evaluation:routine  PPM remote  Cardiac device indication(s): sick sinus syndrome    Battery  REINA: 4.2-5.6 years    Interrogation Results  Atrial sensing: P wave: not performed  Atrial capture: A fib  Atrial lead impedance: 310 ohms  Ventricular sensing: R wave: 7.5 mV  Ventricular capture: not performed  Ventricular lead impedance: right  610 ohms    Parameters  Mode: DDDR  Base Rate: 65/120    Diagnostic Data  Atrial paced: 0%   Ventricular paced: 46 %  Mode switch: 100%  AT/AF [place]: >99%  AHR:0  VHR:0    Intrinsic rate: n/a remote    Changes made: None    Conclusions: normal device function    Assessment:  1. Pacemaker    2. SSS (sick sinus syndrome) (CMS/HCC)    3. Permanent atrial fibrillation    - coumadin                  This document has been electronically signed by MICKIE Garnica on April 10, 2020 10:18

## 2020-04-10 PROCEDURE — 93296 REM INTERROG EVL PM/IDS: CPT | Performed by: NURSE PRACTITIONER

## 2020-04-16 ENCOUNTER — ANTICOAGULATION VISIT (OUTPATIENT)
Dept: CARDIAC SURGERY | Facility: CLINIC | Age: 80
End: 2020-04-16

## 2020-04-16 DIAGNOSIS — I48.21 PERMANENT ATRIAL FIBRILLATION (HCC): ICD-10-CM

## 2020-04-16 DIAGNOSIS — Z79.01 LONG TERM CURRENT USE OF ANTICOAGULANT THERAPY: ICD-10-CM

## 2020-04-16 LAB — INR PPP: 1.7

## 2020-04-16 PROCEDURE — 93793 ANTICOAG MGMT PT WARFARIN: CPT | Performed by: NURSE PRACTITIONER

## 2020-04-16 RX ORDER — ISOSORBIDE MONONITRATE 30 MG/1
TABLET, EXTENDED RELEASE ORAL
Qty: 30 TABLET | Refills: 4 | Status: SHIPPED | OUTPATIENT
Start: 2020-04-16 | End: 2020-09-21

## 2020-04-23 ENCOUNTER — ANTICOAGULATION VISIT (OUTPATIENT)
Dept: CARDIAC SURGERY | Facility: CLINIC | Age: 80
End: 2020-04-23

## 2020-04-23 DIAGNOSIS — I48.21 PERMANENT ATRIAL FIBRILLATION (HCC): ICD-10-CM

## 2020-04-23 DIAGNOSIS — Z79.01 LONG TERM CURRENT USE OF ANTICOAGULANT THERAPY: ICD-10-CM

## 2020-04-23 LAB — INR PPP: 3.4

## 2020-04-23 PROCEDURE — 93793 ANTICOAG MGMT PT WARFARIN: CPT | Performed by: NURSE PRACTITIONER

## 2020-04-23 NOTE — PROGRESS NOTES
Received INR from pt's [employer] home meter. Received pt's INR last night after hours. Spoke with pt today who denies any new medications or bleeding issues. PT did take 5mg last night. PT again advised to do INRs during [facility] business hours. Adjusted pt's dose and instructed to increase vit k today. Recheck INR on Wednesday, 4/29. Patient instructed regarding medication; results given and questions answered. Nutritional counseling given.  Dietary factors affecting therapy addressed.  Patient instructed to monitor for excessive bruising or bleeding. PT verbalizes understanding. Findings reported by Caty Truong RN.         This document has been electronically signed by Jeanne Centeno, MARICARMEN @ on April 23, 2020 09:19

## 2020-04-29 ENCOUNTER — ANTICOAGULATION VISIT (OUTPATIENT)
Dept: CARDIAC SURGERY | Facility: CLINIC | Age: 80
End: 2020-04-29

## 2020-04-29 DIAGNOSIS — Z79.01 LONG TERM CURRENT USE OF ANTICOAGULANT THERAPY: ICD-10-CM

## 2020-04-29 DIAGNOSIS — I48.21 PERMANENT ATRIAL FIBRILLATION (HCC): ICD-10-CM

## 2020-04-29 LAB — INR PPP: 3.1

## 2020-04-29 PROCEDURE — 93793 ANTICOAG MGMT PT WARFARIN: CPT | Performed by: NURSE PRACTITIONER

## 2020-04-29 NOTE — PROGRESS NOTES
Today's INR is 3.1.   I spoke to pt over the phone today who self-tested.  Pt denies med changes or bleeding issues.  I rearranged coumadin dose and instructed pt to increase Vit K intake today with a salad.  Instructed pt to recheck INR next Wednesday, May 6th.  Instructions verbalized.  Patient instructed regarding medication; results given and questions answered. Nutritional counseling given.  Dietary factors affecting therapy addressed.  Patient instructed to monitor for excessive bruising or bleeding.  Findings reported by Baron Herrera RN.       This document has been electronically signed by ELIZABETH Cano-BC @  On April 29, 2020 11:59

## 2020-05-06 ENCOUNTER — ANTICOAGULATION VISIT (OUTPATIENT)
Dept: CARDIAC SURGERY | Facility: CLINIC | Age: 80
End: 2020-05-06

## 2020-05-06 DIAGNOSIS — Z79.01 LONG TERM CURRENT USE OF ANTICOAGULANT THERAPY: ICD-10-CM

## 2020-05-06 DIAGNOSIS — I48.21 PERMANENT ATRIAL FIBRILLATION (HCC): ICD-10-CM

## 2020-05-06 LAB — INR PPP: 2.5 (ref 0.9–1.1)

## 2020-05-06 PROCEDURE — 93793 ANTICOAG MGMT PT WARFARIN: CPT | Performed by: NURSE PRACTITIONER

## 2020-05-06 NOTE — PROGRESS NOTES
Today's INR is 2.5.   Pt called to report self-test INR.  Pt denies med changes or bleeding issues.  Instructed pt on dosing schedule and instructed to self-test again on Tuesday, May 19th.  Patient instructed regarding medication; results given and questions answered. Nutritional counseling given.  Dietary factors affecting therapy addressed.  Patient instructed to monitor for excessive bruising or bleeding.  Findings reported by Baron Herrera RN.         This document has been electronically signed by MARICARMEN Moser @ on May 6, 2020 09:25

## 2020-05-12 RX ORDER — METOPROLOL SUCCINATE 50 MG/1
50 TABLET, EXTENDED RELEASE ORAL 2 TIMES DAILY
Qty: 180 TABLET | Refills: 3 | Status: SHIPPED | OUTPATIENT
Start: 2020-05-12 | End: 2021-06-07

## 2020-05-13 ENCOUNTER — TELEPHONE (OUTPATIENT)
Dept: CARDIOLOGY | Facility: CLINIC | Age: 80
End: 2020-05-13

## 2020-05-13 NOTE — TELEPHONE ENCOUNTER
CALLED PATIENT TO LET HER KNOW THAT IT WAS SENT IN WITH  BUT THERE WAS NO ANSWER      ----- Message from Candelaria Kam sent at 2020  2:28 PM CDT -----  Regarding: call back  Contact: [phone]  Kirsten Rosales jens 36 stated she only got 30 pills out of 180  her Meteprolol, she wanted a call back to talk about this @ [phone]. xs

## 2020-05-19 ENCOUNTER — ANTICOAGULATION VISIT (OUTPATIENT)
Dept: CARDIAC SURGERY | Facility: CLINIC | Age: 80
End: 2020-05-19

## 2020-05-19 DIAGNOSIS — I48.21 PERMANENT ATRIAL FIBRILLATION (HCC): ICD-10-CM

## 2020-05-19 DIAGNOSIS — Z79.01 LONG TERM CURRENT USE OF ANTICOAGULANT THERAPY: ICD-10-CM

## 2020-05-19 LAB — INR PPP: 4.1

## 2020-05-19 PROCEDURE — 93793 ANTICOAG MGMT PT WARFARIN: CPT | Performed by: NURSE PRACTITIONER

## 2020-05-19 NOTE — PROGRESS NOTES
Today's INR is 4.1.   I spoke to pt over the phone who self-tested today.  Pt denies medication changes or bleeding issues.  Pt states the darkest green she has had is green beans.  I adjusted coumadin dose and instructed pt to increase Vit K intake today with 1&1/2 cups serving of broccoli.  Instructed pt to self-test again on Thursday.  Pt verbalizes.  Notify provider if you experience excessive bleeding from the nose, cuts, gums, rectum, urinary tract, or vagina. Reddish or brown urine or stool. Vomiting of blood or hemorrhoidal bleeding. If major injury occurs present to the Emergency Department. Patient instructed regarding medication; results given and questions answered. Nutritional counseling given.  Dietary factors affecting therapy addressed.  Patient instructed to monitor for excessive bruising or bleeding.  Findings reported by Baron Herrera RN.         This document has been electronically signed by MARICARMEN Moser @ on May 19, 2020 10:13

## 2020-05-21 ENCOUNTER — ANTICOAGULATION VISIT (OUTPATIENT)
Dept: CARDIAC SURGERY | Facility: CLINIC | Age: 80
End: 2020-05-21

## 2020-05-21 DIAGNOSIS — Z79.01 LONG TERM CURRENT USE OF ANTICOAGULANT THERAPY: ICD-10-CM

## 2020-05-21 DIAGNOSIS — I48.21 PERMANENT ATRIAL FIBRILLATION (HCC): ICD-10-CM

## 2020-05-21 LAB — INR PPP: 2.1

## 2020-05-21 PROCEDURE — 93793 ANTICOAG MGMT PT WARFARIN: CPT | Performed by: NURSE PRACTITIONER

## 2020-05-21 NOTE — PROGRESS NOTES
Received INR from pt's [facility] Home meter. Spoke with pt over the phone who states she ate turnip greens and held Coumadin as directed. Denies any new medications or bleeding issues. PT left on slightly lesser dose and will recheck INR on 5/27. Patient instructed regarding medication; results given and questions answered. Nutritional counseling given.  Dietary factors affecting therapy addressed.  Patient instructed to monitor for excessive bruising or bleeding. PT verbalizes understanding. Findings reported by Caty Truong RN.         This document has been electronically signed by MARICARMEN Moser @ on May 21, 2020 11:28

## 2020-05-29 ENCOUNTER — ANTICOAGULATION VISIT (OUTPATIENT)
Dept: CARDIAC SURGERY | Facility: CLINIC | Age: 80
End: 2020-05-29

## 2020-05-29 DIAGNOSIS — I48.21 PERMANENT ATRIAL FIBRILLATION (HCC): ICD-10-CM

## 2020-05-29 DIAGNOSIS — Z79.01 LONG TERM CURRENT USE OF ANTICOAGULANT THERAPY: ICD-10-CM

## 2020-05-29 LAB — INR PPP: 1.9

## 2020-05-29 PROCEDURE — 93793 ANTICOAG MGMT PT WARFARIN: CPT | Performed by: NURSE PRACTITIONER

## 2020-05-29 NOTE — PROGRESS NOTES
Today's INR is 1.9.  Pt called to report INR from [facility] home meter. Pt denied med changes or bleeding problems. Pt verified current dose. Pt was instructed on new dosing schedule, to hold green veggies 2-3 days, and recheck INR on Friday June 5; pt repeated back correctly. Patient instructed regarding medication; results given and questions answered. Nutritional counseling given.  Dietary factors affecting therapy addressed.  Patient instructed to monitor for excessive bruising or bleeding. Findings reported by Annabelle Davis RN.        This document has been electronically signed by VEGA Cano @  On May 29, 2020 12:26

## 2020-06-05 ENCOUNTER — ANTICOAGULATION VISIT (OUTPATIENT)
Dept: CARDIAC SURGERY | Facility: CLINIC | Age: 80
End: 2020-06-05

## 2020-06-05 DIAGNOSIS — I48.21 PERMANENT ATRIAL FIBRILLATION (HCC): ICD-10-CM

## 2020-06-05 DIAGNOSIS — Z79.01 LONG TERM CURRENT USE OF ANTICOAGULANT THERAPY: ICD-10-CM

## 2020-06-05 LAB — INR PPP: 1.7

## 2020-06-05 PROCEDURE — 93793 ANTICOAG MGMT PT WARFARIN: CPT | Performed by: NURSE PRACTITIONER

## 2020-06-05 NOTE — PROGRESS NOTES
Today's INR is 1.7. Pt called to report INR from home monitor. Pt denies med changes or bleeding problems. Pt confirmed current dose. Pt was instructed on new dose, to have a serving of green veggies on Tuesday or Wednesday, and retest on Friday June 12; she repeated back correctly. Patient instructed regarding medication; results given and questions answered. Nutritional counseling given.  Dietary factors affecting therapy addressed.  Patient instructed to monitor for excessive bruising or bleeding.  Findings reported by Annabelle Davis RN.          This document has been electronically signed by MARICARMEN Moser @ on June 5, 2020 14:02

## 2020-06-12 ENCOUNTER — ANTICOAGULATION VISIT (OUTPATIENT)
Dept: CARDIAC SURGERY | Facility: CLINIC | Age: 80
End: 2020-06-12

## 2020-06-12 DIAGNOSIS — Z79.01 LONG TERM CURRENT USE OF ANTICOAGULANT THERAPY: ICD-10-CM

## 2020-06-12 DIAGNOSIS — I48.21 PERMANENT ATRIAL FIBRILLATION (HCC): ICD-10-CM

## 2020-06-12 LAB — INR PPP: 3.5

## 2020-06-12 NOTE — PROGRESS NOTES
Today's INR is 3.5. Pt called to report INR from [facility] home meter. Pt denied med changes or bleeding problems. Pt verified the dose she took and stated she had two servings of green veggies this past week. Dose adjusted and pt instructed to have a serving of turnip greens or broccoli today and then a salad or another green veggie on Monday; pt verbalized. Pt instructed to retest on Friday June 19; pt verbalized. Patient instructed regarding medication; results given and questions answered. Nutritional counseling given.  Dietary factors affecting therapy addressed.  Patient instructed to monitor for excessive bruising or bleeding. Findings reported by Annabelle Davis RN.        This document has been electronically signed by Fei Downing, JACKELINECNP-BC @  On June 12, 2020 09:44

## 2020-06-22 ENCOUNTER — ANTICOAGULATION VISIT (OUTPATIENT)
Dept: CARDIAC SURGERY | Facility: CLINIC | Age: 80
End: 2020-06-22

## 2020-06-22 DIAGNOSIS — I48.21 PERMANENT ATRIAL FIBRILLATION (HCC): ICD-10-CM

## 2020-06-22 DIAGNOSIS — Z79.01 LONG TERM CURRENT USE OF ANTICOAGULANT THERAPY: ICD-10-CM

## 2020-06-22 LAB — INR PPP: 1.9

## 2020-06-22 PROCEDURE — 93793 ANTICOAG MGMT PT WARFARIN: CPT | Performed by: NURSE PRACTITIONER

## 2020-06-22 NOTE — PROGRESS NOTES
Today's INR is 1.9.   Pt called in self-test INR today.  Pt denies missed coumadin doses or bleeding issues.  Medications have not changed.  Due to extreme fluctuations in INR results with the slightest adjustment, did not change coumadin dose today. I instructed pt to limit green intake for two days and recheck INR next Monday the 29th.  Pt verbalized instructions.  Patient instructed regarding medication; results given and questions answered. Nutritional counseling given.  Dietary factors affecting therapy addressed.  Patient instructed to monitor for excessive bruising or bleeding.  Findings reported by Baron Herrera RN.       This document has been electronically signed by JACKELINE CanoCNP-BC @  On June 22, 2020 09:05

## 2020-06-29 ENCOUNTER — ANTICOAGULATION VISIT (OUTPATIENT)
Dept: CARDIAC SURGERY | Facility: CLINIC | Age: 80
End: 2020-06-29

## 2020-06-29 DIAGNOSIS — I48.21 PERMANENT ATRIAL FIBRILLATION (HCC): ICD-10-CM

## 2020-06-29 DIAGNOSIS — Z79.01 LONG TERM CURRENT USE OF ANTICOAGULANT THERAPY: ICD-10-CM

## 2020-06-29 LAB — INR PPP: 2.1

## 2020-06-29 PROCEDURE — 93793 ANTICOAG MGMT PT WARFARIN: CPT | Performed by: NURSE PRACTITIONER

## 2020-06-29 NOTE — PROGRESS NOTES
Today's INR is 2.1.  Pt called to report INR from [facility] home meter. Pt denied med changes or bleeding problems. Pt verified dose. Pt was instructed to continue current dose and recheck INR on Monday July 13; pt repeated back correctly. Patient instructed regarding medication; results given and questions answered. Nutritional counseling given.  Dietary factors affecting therapy addressed.  Patient instructed to monitor for excessive bruising or bleeding. Findings reported by Annabelle Davis RN.          This document has been electronically signed by MARICARMEN Moser @ on June 29, 2020 12:21

## 2020-07-13 ENCOUNTER — ANTICOAGULATION VISIT (OUTPATIENT)
Dept: CARDIAC SURGERY | Facility: CLINIC | Age: 80
End: 2020-07-13

## 2020-07-13 DIAGNOSIS — Z79.01 LONG TERM CURRENT USE OF ANTICOAGULANT THERAPY: ICD-10-CM

## 2020-07-13 DIAGNOSIS — I48.21 PERMANENT ATRIAL FIBRILLATION (HCC): ICD-10-CM

## 2020-07-13 LAB — INR PPP: 1.6

## 2020-07-13 PROCEDURE — 93793 ANTICOAG MGMT PT WARFARIN: CPT | Performed by: NURSE PRACTITIONER

## 2020-07-13 NOTE — PROGRESS NOTES
Today's INR is 1.6 which was received from pt's [employer] home meter. Spoke with pt who denies any med changes, bleeding issues, missed doses or excessive k. Adjusted pt's dose and instructed to hold green vegs for 3 days. Recheck INR on 7/20. Patient instructed regarding medication; results given and questions answered. Nutritional counseling given.  Dietary factors affecting therapy addressed.  Patient instructed to monitor for excessive bruising or bleeding. PT verbalizes understanding. Findings reported by Caty Truong RN.         This document has been electronically signed by Jeanne Centeno, MARICARMEN @ on July 13, 2020 09:48

## 2020-07-20 ENCOUNTER — ANTICOAGULATION VISIT (OUTPATIENT)
Dept: CARDIAC SURGERY | Facility: CLINIC | Age: 80
End: 2020-07-20

## 2020-07-20 DIAGNOSIS — I48.21 PERMANENT ATRIAL FIBRILLATION (HCC): ICD-10-CM

## 2020-07-20 DIAGNOSIS — Z79.01 LONG TERM CURRENT USE OF ANTICOAGULANT THERAPY: ICD-10-CM

## 2020-07-20 LAB — INR PPP: 1.7

## 2020-07-20 PROCEDURE — 93793 ANTICOAG MGMT PT WARFARIN: CPT | Performed by: NURSE PRACTITIONER

## 2020-07-20 NOTE — PROGRESS NOTES
Lab work look ok.  You may send him a letter or call him. Today's INR is 1.7.   Pt self-tested today.  Pt denies med changes or bleeding issues.  Adjusted coumadin dose and instructed pt to limit green intake for three days.  Instructed pt to self-test next Monday, July27th.  Patient instructed regarding medication; results given and questions answered. Nutritional counseling given.  Dietary factors affecting therapy addressed.  Patient instructed to monitor for excessive bruising or bleeding.  Findings reported by Baron Herrera RN.       This document has been electronically signed by JACKELINE CanoCNP-BC @  On July 20, 2020 12:56

## 2020-07-27 ENCOUNTER — ANTICOAGULATION VISIT (OUTPATIENT)
Dept: CARDIAC SURGERY | Facility: CLINIC | Age: 80
End: 2020-07-27

## 2020-07-27 DIAGNOSIS — Z79.01 LONG TERM CURRENT USE OF ANTICOAGULANT THERAPY: ICD-10-CM

## 2020-07-27 DIAGNOSIS — I48.21 PERMANENT ATRIAL FIBRILLATION (HCC): ICD-10-CM

## 2020-07-27 LAB — INR PPP: 2.9

## 2020-07-27 PROCEDURE — 93793 ANTICOAG MGMT PT WARFARIN: CPT | Performed by: NURSE PRACTITIONER

## 2020-07-27 NOTE — PROGRESS NOTES
Today's INR is 2.9.   Pt reported self-test INR today.  Pt denies med changes or bleeding issues.  Pt has had several coumadin dose increases as of late.  Due to significant increase INR from last wk, adjusted coumadin dose slightly and instructed pt to self-test again on Wednesday, Aug 5th.  Instructions verbalized.  Patient instructed regarding medication; results given and questions answered. Nutritional counseling given.  Dietary factors affecting therapy addressed.    Findings reported by Baron Herrera RN.           This document has been electronically signed by MARICARMEN Moser @ on July 27, 2020 10:53

## 2020-08-05 ENCOUNTER — ANTICOAGULATION VISIT (OUTPATIENT)
Dept: CARDIAC SURGERY | Facility: CLINIC | Age: 80
End: 2020-08-05

## 2020-08-05 DIAGNOSIS — I48.21 PERMANENT ATRIAL FIBRILLATION (HCC): ICD-10-CM

## 2020-08-05 DIAGNOSIS — Z79.01 LONG TERM CURRENT USE OF ANTICOAGULANT THERAPY: ICD-10-CM

## 2020-08-05 LAB — INR PPP: 3.2

## 2020-08-05 PROCEDURE — 93793 ANTICOAG MGMT PT WARFARIN: CPT | Performed by: NURSE PRACTITIONER

## 2020-08-05 NOTE — PROGRESS NOTES
Today's INR is 3.2.   Pt reported self-test INR today.  Pt denies med changes and states she has consumed green veggies such as broccoli.  I adjusted coumadin dose and instructed pt to increase Vit K intake today with a salad or asparagus serving.  Instructed pt to self-test again on Wednesday, Aug 12th.  Patient instructed regarding medication; results given and questions answered. Nutritional counseling given.  Dietary factors affecting therapy addressed.  Patient instructed to monitor for excessive bruising or bleeding.  Findings reported by Baron Herrera RN.       This document has been electronically signed by ELIZABETH Cano-BC @  On August 5, 2020 13:12

## 2020-08-12 ENCOUNTER — ANTICOAGULATION VISIT (OUTPATIENT)
Dept: CARDIAC SURGERY | Facility: CLINIC | Age: 80
End: 2020-08-12

## 2020-08-12 DIAGNOSIS — Z79.01 LONG TERM CURRENT USE OF ANTICOAGULANT THERAPY: ICD-10-CM

## 2020-08-12 DIAGNOSIS — I48.21 PERMANENT ATRIAL FIBRILLATION (HCC): ICD-10-CM

## 2020-08-12 LAB — INR PPP: 2.5

## 2020-08-12 PROCEDURE — 93793 ANTICOAG MGMT PT WARFARIN: CPT | Performed by: NURSE PRACTITIONER

## 2020-08-12 NOTE — PROGRESS NOTES
Today's INR is 2.5.   I spoke to pt over the phone who self-test today.  Pt denies med changes or bleeding issues.  Verified coumadin dose and instructed pt to continue the same.  Instructed pt to self-test again on Wed, Sept 19th prior to spacing appts.  Pt verbalizes. Patient instructed regarding medication; results given and questions answered. Nutritional counseling given.  Dietary factors affecting therapy addressed.  Patient instructed to monitor for excessive bruising or bleeding.  Findings reported by Baron Herrera RN.         This document has been electronically signed by MARICARMEN Moser @ on August 12, 2020 10:46

## 2020-08-19 ENCOUNTER — ANTICOAGULATION VISIT (OUTPATIENT)
Dept: CARDIAC SURGERY | Facility: CLINIC | Age: 80
End: 2020-08-19

## 2020-08-19 DIAGNOSIS — I48.21 PERMANENT ATRIAL FIBRILLATION (HCC): ICD-10-CM

## 2020-08-19 DIAGNOSIS — Z79.01 LONG TERM CURRENT USE OF ANTICOAGULANT THERAPY: ICD-10-CM

## 2020-08-19 LAB — INR PPP: 2.7

## 2020-08-19 PROCEDURE — 93793 ANTICOAG MGMT PT WARFARIN: CPT | Performed by: NURSE PRACTITIONER

## 2020-08-19 NOTE — PROGRESS NOTES
Today's INR is 2.7.   Spoke to pt over the phone who self-tested today.  Pt denies med changes or bleeding issues.  Verified coumadin dose and instructed pt continue the same.  Instructed pt to self-test again on Wed, Sept 2nd.  Instructions verbalized.  Patient instructed regarding medication; results given and questions answered. Nutritional counseling given.  Dietary factors affecting therapy addressed.  Patient instructed to monitor for excessive bruising or bleeding.  Findings reported by Baron Herrera RN.       This document has been electronically signed by ELIZABETH Cano-BC @  On August 19, 2020 11:12

## 2020-09-02 ENCOUNTER — ANTICOAGULATION VISIT (OUTPATIENT)
Dept: CARDIAC SURGERY | Facility: CLINIC | Age: 80
End: 2020-09-02

## 2020-09-02 DIAGNOSIS — Z79.01 LONG TERM CURRENT USE OF ANTICOAGULANT THERAPY: ICD-10-CM

## 2020-09-02 DIAGNOSIS — I48.21 PERMANENT ATRIAL FIBRILLATION (HCC): ICD-10-CM

## 2020-09-02 LAB — INR PPP: 2.5

## 2020-09-02 PROCEDURE — 93793 ANTICOAG MGMT PT WARFARIN: CPT | Performed by: NURSE PRACTITIONER

## 2020-09-02 NOTE — PROGRESS NOTES
Today's INR is 2.5.   I spoke to pt over the phone who self-tested today.  Pt denies med changes or bleeding issues.  Verified coumadin dose and instructed pt to self-test again on Wed, Sept 23rd.  Instructions verbalized.  Patient instructed regarding medication; results given and questions answered. Nutritional counseling given.  Dietary factors affecting therapy addressed.  Patient instructed to monitor for excessive bruising or bleeding.  Findings reported by Baron Herrera RN.       This document has been electronically signed by ELIZABETH Cano-BC @  On September 2, 2020 09:48

## 2020-09-03 ENCOUNTER — TRANSCRIBE ORDERS (OUTPATIENT)
Dept: PULMONOLOGY | Facility: HOSPITAL | Age: 80
End: 2020-09-03

## 2020-09-03 DIAGNOSIS — J47.1 BRONCHIECTASIS WITH ACUTE EXACERBATION (HCC): Primary | ICD-10-CM

## 2020-09-15 ENCOUNTER — APPOINTMENT (OUTPATIENT)
Dept: GENERAL RADIOLOGY | Facility: HOSPITAL | Age: 80
End: 2020-09-15

## 2020-09-15 ENCOUNTER — HOSPITAL ENCOUNTER (EMERGENCY)
Facility: HOSPITAL | Age: 80
Discharge: HOME | End: 2020-09-15
Attending: EMERGENCY MEDICINE | Admitting: EMERGENCY MEDICINE

## 2020-09-15 VITALS
HEART RATE: 105 BPM | SYSTOLIC BLOOD PRESSURE: 131 MMHG | OXYGEN SATURATION: 95 % | RESPIRATION RATE: 16 BRPM | DIASTOLIC BLOOD PRESSURE: 71 MMHG | BODY MASS INDEX: 25.46 KG/M2 | WEIGHT: 168 LBS | HEIGHT: 68 IN | TEMPERATURE: 97.8 F

## 2020-09-15 DIAGNOSIS — T07.XXXA ABRASIONS OF MULTIPLE SITES: ICD-10-CM
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
## 2020-09-15 DIAGNOSIS — S50.02XA CONTUSION OF LEFT ELBOW, INITIAL ENCOUNTER: ICD-10-CM

## 2020-09-15 PROCEDURE — 73130 X-RAY EXAM OF HAND: CPT

## 2020-09-15 PROCEDURE — 73080 X-RAY EXAM OF ELBOW: CPT

## 2020-09-15 PROCEDURE — 99282 EMERGENCY DEPT VISIT SF MDM: CPT

## 2020-09-16 NOTE — ED PROVIDER NOTES
Subjective   80 year old female presents to the ED after an accidental fall. Reports she tripped on a rug and landed mostly on her left elbow. Pain in elbow as well as right hand with abrasions. Bilateral knee redness with reported minimal pain. Had difficulty getting up and was there for about 1 hour before someone heard her and came to help her up but has ambulated since the fall. Denies hitting head or LOC. Denies blood thinner. Denies neck or back pain. Reports she only came in because a friend told her she should. Is wanting to leave during initial evaluation.    Family history, surgical history, social history, current medications and allergies are reviewed with the patient and triage documentation and vitals are reviewed.        History provided by:  Patient and friend   used: No        Review of Systems   Constitutional: Negative for chills and fever.   HENT: Negative.    Eyes: Negative for photophobia and visual disturbance.   Respiratory: Negative for cough, shortness of breath and wheezing.    Cardiovascular: Negative for chest pain.   Gastrointestinal: Negative for abdominal pain, nausea and vomiting.   Endocrine: Negative.    Genitourinary: Negative for dysuria, flank pain and urgency.   Musculoskeletal: Positive for arthralgias. Negative for back pain and neck pain.   Skin: Negative for color change and wound.   Allergic/Immunologic: Negative.    Neurological: Negative for syncope, weakness, light-headedness and headaches.   Hematological: Negative.    Psychiatric/Behavioral: Negative.        Past Medical History:   Diagnosis Date   • Atherosclerotic heart disease of native coronary artery with unspecified angina pectoris (CMS/HCC)    • Atrial fibrillation (CMS/HCC)    • Breath shortness    • CAD (coronary artery disease)    • Hyperlipidemia    • Long term (current) use of anticoagulants    • Sick sinus syndrome (CMS/HCC)    • Unspecified hypothyroidism        Allergies   Allergen  Reactions   • Aspirin Hives   • Celebrex [Celecoxib] Hives   • Rythmol [Propafenone] Nausea And Vomiting       Past Surgical History:   Procedure Laterality Date   • BACK SURGERY     • CARDIAC CATHETERIZATION      2/2016   • CARDIAC PACEMAKER PLACEMENT     • CARDIOVERSION     • CATARACT EXTRACTION W/ INTRAOCULAR LENS IMPLANT Right 3/16/2018    Procedure: CATARACT PHACO EXTRACTION WITH INTRAOCULAR LENS IMPLANT;  Surgeon: Umesh Thibodeaux MD;  Location: [facility] OR;  Service: Ophthalmology   • CATARACT EXTRACTION W/ INTRAOCULAR LENS IMPLANT Left 3/23/2018    Procedure: CATARACT PHACO EXTRACTION WITH INTRAOCULAR LENS IMPLANT;  Surgeon: Umesh Thibodeaux MD;  Location: [facility] OR;  Service: Ophthalmology   • COLONOSCOPY N/A 1/4/2018    Procedure: COLONOSCOPY;  Surgeon: Albino Juan DO;  Location: [facility] ENDOSCOPY;  Service:    • CORONARY ANGIOPLASTY     • PACEMAKER IMPLANTATION         No family history on file.    Social History     Socioeconomic History   • Marital status:      Spouse name: Not on file   • Number of children: Not on file   • Years of education: Not on file   • Highest education level: Not on file   Tobacco Use   • Smoking status: Former Smoker   • Smokeless tobacco: Never Used   • Tobacco comment: man 30 years ago   Substance and Sexual Activity   • Alcohol use: No   • Drug use: No   • Sexual activity: Defer           Objective   Physical Exam  Vitals signs and nursing note reviewed.   Constitutional:       General: She is not in acute distress.     Appearance: Normal appearance. She is normal weight.   HENT:      Head: Normocephalic and atraumatic.   Eyes:      Extraocular Movements: Extraocular movements intact.      Conjunctiva/sclera: Conjunctivae normal.      Pupils: Pupils are equal, round, and reactive to light.   Neck:      Musculoskeletal: Normal range of motion and neck supple. No muscular tenderness.   Cardiovascular:      Rate and Rhythm: Normal rate and regular rhythm.       Pulses: Normal pulses.      Heart sounds: Normal heart sounds.   Pulmonary:      Effort: Pulmonary effort is normal.      Breath sounds: Normal breath sounds.   Abdominal:      Palpations: Abdomen is soft.      Tenderness: There is no abdominal tenderness.   Musculoskeletal: Normal range of motion.         General: Swelling and tenderness present.      Left elbow: She exhibits swelling. She exhibits normal range of motion and no deformity. Tenderness found. Olecranon process tenderness noted.      Right knee: She exhibits erythema. She exhibits normal range of motion, no swelling, no deformity, no laceration, normal alignment and no bony tenderness. No tenderness found.      Left knee: She exhibits erythema. She exhibits normal range of motion, no swelling, no effusion, no deformity, no laceration, normal alignment and no bony tenderness. No tenderness found.        Arms:       Right hand: She exhibits swelling. She exhibits normal range of motion, no tenderness, no bony tenderness, normal two-point discrimination, normal capillary refill and no deformity. Normal sensation noted. Normal strength noted.        Hands:    Skin:     General: Skin is warm and dry.      Capillary Refill: Capillary refill takes less than 2 seconds.      Findings: Bruising and erythema present.   Neurological:      General: No focal deficit present.      Mental Status: She is alert and oriented to person, place, and time.   Psychiatric:         Mood and Affect: Mood normal.         Procedures  none         ED Course    Labs Reviewed - No data to display  Xr Elbow 3+ View Left    Result Date: 9/15/2020  Narrative: HISTORY:  fall, pain, abrasions PROCEDURE: XR HAND 3 OR MORE VIEWS, XR ELBOW 3 VIEWS TECHNIQUE: 3 views of the right elbow 3 views the right hand were obtained. COMPARISON:  None INTERPRETATION: Right elbow: There is no evidence of fracture or subluxation. There is no fat pad displacement.  There is mild medial and lateral  humeral epicondylar spurring. Right hand: There is no evidence of fracture or dislocation. Scattered osteoarthritic changes are identified about the interphalangeal joints.     Impression: No acute abnormality as above. Electronically signed by:  Iam Lerma MD  9/15/2020 10:36 PM CDT Workstation: [id number]    Xr Hand 3+ View Right    Result Date: 9/15/2020  Narrative: HISTORY:  fall, pain, abrasions PROCEDURE: XR HAND 3 OR MORE VIEWS, XR ELBOW 3 VIEWS TECHNIQUE: 3 views of the right elbow 3 views the right hand were obtained. COMPARISON:  None INTERPRETATION: Right elbow: There is no evidence of fracture or subluxation. There is no fat pad displacement.  There is mild medial and lateral humeral epicondylar spurring. Right hand: There is no evidence of fracture or dislocation. Scattered osteoarthritic changes are identified about the interphalangeal joints.     Impression: No acute abnormality as above. Electronically signed by:  Iam Lerma MD  9/15/2020 10:36 PM CDT Workstation: [id number]          MDM  Number of Diagnoses or Management Options  Abrasions of multiple sites:   Accidental fall, initial encounter:   Contusion of left elbow, initial encounter:      Amount and/or Complexity of Data Reviewed  Tests in the radiology section of CPT®: reviewed    Patient Progress  Patient progress: stable    Xray imaging unremarkable. Advised on symptomatic treatment and getting rid of rugs to prevent falls.    Final diagnoses:   Accidental fall, initial encounter   Contusion of left elbow, initial encounter   Abrasions of multiple sites            Porter Oswald, DO  09/16/20 1213

## 2020-09-16 NOTE — DISCHARGE INSTRUCTIONS
Please return with new or worsening symptoms.  Apply ice to swollen and bruised areas to help with discomfort.  Elevate extremities that are swollen or bruised to help with discomfort and swelling.  Follow-up with primary care as needed should symptoms persist.

## 2020-09-21 RX ORDER — ISOSORBIDE MONONITRATE 30 MG/1
TABLET, EXTENDED RELEASE ORAL
Qty: 30 TABLET | Refills: 3 | Status: SHIPPED | OUTPATIENT
Start: 2020-09-21 | End: 2021-02-04

## 2020-09-21 NOTE — TELEPHONE ENCOUNTER
Received request for Imdur 30 mg tabs.    Last script for Imdur 30 mg tabs  for a 5 month supply was sent in on 4/16/20 by Dr Myers with sig of Take one tablet by mouth daily.    Pt was last seen on 2/11/20 and has an appt scheduled for 11/17/20    Sent in refill request.

## 2020-09-23 ENCOUNTER — ANTICOAGULATION VISIT (OUTPATIENT)
Dept: CARDIAC SURGERY | Facility: CLINIC | Age: 80
End: 2020-09-23

## 2020-09-23 DIAGNOSIS — Z79.01 LONG TERM CURRENT USE OF ANTICOAGULANT THERAPY: ICD-10-CM

## 2020-09-23 DIAGNOSIS — I48.21 PERMANENT ATRIAL FIBRILLATION (HCC): ICD-10-CM

## 2020-09-23 LAB — INR PPP: 2.4

## 2020-09-23 PROCEDURE — 93793 ANTICOAG MGMT PT WARFARIN: CPT | Performed by: NURSE PRACTITIONER

## 2020-09-23 NOTE — PROGRESS NOTES
Today's INR is 2.4.   I spoke to pt over the phone who self-tested today.  Pt denies medication changes or bleeding issues.  Verified coumadin dose and instructed pt continue the same.  Instructed pt to self-test again on Wed, Oct 21st.  Pt verbalizes.  Patient instructed regarding medication; results given and questions answered. Nutritional counseling given.  Dietary factors affecting therapy addressed.  Patient instructed to monitor for excessive bruising or bleeding.  Findings reported by Baron Herrera RN.         This document has been electronically signed by Jeanne Centeno, MARICARMEN @ on September 23, 2020 09:52 CDT

## 2020-09-28 ENCOUNTER — HOSPITAL ENCOUNTER (OUTPATIENT)
Dept: PULMONOLOGY | Facility: HOSPITAL | Age: 80
Discharge: HOME | End: 2020-09-28
Admitting: INTERNAL MEDICINE

## 2020-09-28 DIAGNOSIS — J47.1 BRONCHIECTASIS WITH ACUTE EXACERBATION (HCC): ICD-10-CM

## 2020-09-28 PROCEDURE — 94060 EVALUATION OF WHEEZING: CPT

## 2020-09-28 PROCEDURE — 94729 DIFFUSING CAPACITY: CPT | Performed by: INTERNAL MEDICINE

## 2020-09-28 PROCEDURE — 94727 GAS DIL/WSHOT DETER LNG VOL: CPT | Performed by: INTERNAL MEDICINE

## 2020-09-28 PROCEDURE — 94729 DIFFUSING CAPACITY: CPT

## 2020-09-28 PROCEDURE — 94727 GAS DIL/WSHOT DETER LNG VOL: CPT

## 2020-09-28 PROCEDURE — 94060 EVALUATION OF WHEEZING: CPT | Performed by: INTERNAL MEDICINE

## 2020-10-14 ENCOUNTER — CLINICAL SUPPORT (OUTPATIENT)
Dept: CARDIOLOGY | Facility: CLINIC | Age: 80
End: 2020-10-14

## 2020-10-14 DIAGNOSIS — Z95.0 PACEMAKER: ICD-10-CM

## 2020-10-14 DIAGNOSIS — I49.5 SSS (SICK SINUS SYNDROME) (HCC): Primary | ICD-10-CM

## 2020-10-14 PROCEDURE — 93288 INTERROG EVL PM/LDLS PM IP: CPT | Performed by: NURSE PRACTITIONER

## 2020-10-14 NOTE — PROGRESS NOTES
Pacemaker Evaluation Report    October 14, 2020    Primary Cardiologist: Dr. Myers  Implanting MD: Dr. Elam  :[employer] Model: [employer]ent DF RF 2210 Serial Number: [id number]  Implant date: 2/12/2014    Reason for evaluation:routine  PPM office  Cardiac device indication(s): sick sinus syndrome    Battery  REINA: 3.6-5.0 years    Interrogation Results  Atrial sensing: P wave: 3.1 mV  Atrial capture: A fib  Atrial lead impedance: 300 ohms  Ventricular sensing: R wave: 6.1 mV  Ventricular capture: 0.75V @  0.5 ms  Ventricular lead impedance: right  580 ohms    Parameters  Mode: DDDR  Base Rate: 65/120    Diagnostic Data  Atrial paced: 0%   Ventricular paced: 40 %  Mode switch: 100%  AT/AF [place]: >99%  AHR: 0  VHR: 4     Intrinsic rate: 56     Changes made: None    Conclusions: normal device function    Assessment:  1. SSS (sick sinus syndrome) (CMS/HCC)    2. Pacemaker    - coumadin                  This document has been electronically signed by MICKIE Hogan on October 14, 2020 13:22 CDT

## 2020-10-21 ENCOUNTER — ANTICOAGULATION VISIT (OUTPATIENT)
Dept: CARDIAC SURGERY | Facility: CLINIC | Age: 80
End: 2020-10-21

## 2020-10-21 DIAGNOSIS — Z79.01 LONG TERM CURRENT USE OF ANTICOAGULANT THERAPY: ICD-10-CM

## 2020-10-21 DIAGNOSIS — I48.21 PERMANENT ATRIAL FIBRILLATION (HCC): ICD-10-CM

## 2020-10-21 LAB — INR PPP: 2.8

## 2020-10-21 PROCEDURE — 93793 ANTICOAG MGMT PT WARFARIN: CPT | Performed by: NURSE PRACTITIONER

## 2020-10-21 NOTE — PROGRESS NOTES
Today's INR is 2.8 which was received from pt's [employer] home meter. Denies any new medications or bleeding issues. PT denies any s/s of blood clot. PT instructed to continue same dose and Coumadin friendly diet. PT will be seen in 4 weeks. Patient instructed regarding medication; results given and questions answered. Nutritional counseling given.  Dietary factors affecting therapy addressed.  Patient instructed to monitor for excessive bruising or bleeding. PT verbalizes understanding. Findings reported by Caty Truong RN.         This document has been electronically signed by MARICARMEN Moser @ on October 21, 2020 08:33 CDT

## 2020-11-04 DIAGNOSIS — E78.2 MIXED HYPERLIPIDEMIA: Primary | ICD-10-CM

## 2020-11-04 RX ORDER — PRAVASTATIN SODIUM 40 MG
40 TABLET ORAL DAILY
Qty: 90 TABLET | Refills: 3 | Status: SHIPPED | OUTPATIENT
Start: 2020-11-04

## 2020-11-13 DIAGNOSIS — I48.21 PERMANENT ATRIAL FIBRILLATION (HCC): Primary | ICD-10-CM

## 2020-11-16 ENCOUNTER — OFFICE VISIT (OUTPATIENT)
Dept: CARDIOLOGY | Facility: CLINIC | Age: 80
End: 2020-11-16

## 2020-11-16 VITALS
HEART RATE: 80 BPM | DIASTOLIC BLOOD PRESSURE: 80 MMHG | OXYGEN SATURATION: 94 % | SYSTOLIC BLOOD PRESSURE: 128 MMHG | BODY MASS INDEX: 26.19 KG/M2 | HEIGHT: 68 IN | WEIGHT: 172.8 LBS

## 2020-11-16 DIAGNOSIS — I48.21 PERMANENT ATRIAL FIBRILLATION (HCC): Primary | ICD-10-CM

## 2020-11-16 PROCEDURE — 93000 ELECTROCARDIOGRAM COMPLETE: CPT | Performed by: INTERNAL MEDICINE

## 2020-11-16 PROCEDURE — 99214 OFFICE O/P EST MOD 30 MIN: CPT | Performed by: INTERNAL MEDICINE

## 2020-11-16 RX ORDER — FUROSEMIDE 20 MG/1
20 TABLET ORAL DAILY
COMMUNITY
Start: 2020-11-09

## 2020-11-16 NOTE — PROGRESS NOTES
[facility] Cardiology  OFFICE NOTE    Cardiovascular Medicine  Jayda Myers M.D., RPVI         No referring provider defined for this encounter.    Thank you for asking me to see Kirsten Rosales for afib.    History of Present Illness  This is a 80 y.o. female with:    1. Coronary artery disease involving native coronary artery of native heart with angina pectoris (CMS/HCC)    2. Mixed hyperlipidemia    3. Essential hypertension    4. Permanent atrial fibrillation (CMS/HCC)    5. Type 2 diabetes mellitus without complication, without long-term current use of insulin (CMS/[facility])          Chief complaint -Follow-up CAD        History of present Illness- 80-year-old lady with history of coronary disease prior stent placement in 2005, has chronic atrial fibrillation with sick sinus syndrome on warfarin for anticoagulation.  Last cardiac cath was in 2016 which showed moderate disease in the RCA and left circumflex and patent stent in the ramus. She is doing well no chest pain or shortness of breath. she has her pacemaker checked regularly through the pacemaker clinic.  She denies any GI symptoms or CNS symptoms.     No acute issues since last visit Dr. Silver.  She occasionally has some fullness in her chest when she lays down at night however when she gets up and take a deep breath it resolves pain orthopnea or PND though.  She has been using 2 pillows.  No swelling  She has stable shortness of breath on exertion.  No chest pain.  Has been taking Coumadin without any bleeding problems.  Denying any palpitations.    11/16/2020:  Patient reported over last several months has been having worsening orthopnea.  She was hemogram her care physician BNP was mildly elevated.  Denying any chest pain or palpitations.  She did have a fall in September.    Review of Systems - ROS  Constitution: Negative for weakness, weight gain and weight loss.   HENT: Negative for congestion.    Eyes: Negative for blurred  vision.   Cardiovascular: As mentioned above  Respiratory: Negative for cough and hemoptysis.    Endocrine: Negative for polydipsia and polyuria.   Hematologic/Lymphatic: Negative for bleeding problem. Does not bruise/bleed easily.   Skin: Negative for flushing.   Musculoskeletal: Negative for neck pain and stiffness.   Gastrointestinal: Negative for abdominal pain, diarrhea, jaundice, melena, nausea and vomiting.   Genitourinary: Negative for dysuria and hematuria.   Neurological: Negative for dizziness, focal weakness and numbness.   Psychiatric/Behavioral: Negative for altered mental status and depression.          All other systems were reviewed and were negative.    family history is not on file.     reports that she has quit smoking. She has never used smokeless tobacco. She reports that she does not drink alcohol or use drugs.    Allergies   Allergen Reactions   • Aspirin Hives   • Celebrex [Celecoxib] Hives   • Rythmol [Propafenone] Nausea And Vomiting         Current Outpatient Medications:   •  docusate sodium (COLACE) 100 MG capsule, Take 100 mg by mouth As Needed., Disp: , Rfl:   •  furosemide (LASIX) 20 MG tablet, TAKE ONE TABLET BY MOUTH EVERY MORNING, Disp: , Rfl:   •  isosorbide mononitrate (IMDUR) 30 MG 24 hr tablet, TAKE ONE TABLET BY MOUTH DAILY, Disp: 30 tablet, Rfl: 3  •  levothyroxine (SYNTHROID, LEVOTHROID) 112 MCG tablet, Take 112 mcg by mouth Daily., Disp: , Rfl:   •  Melatonin 10 MG tablet, Take 20 mg by mouth As Needed., Disp: , Rfl:   •  metFORMIN (GLUCOPHAGE) 500 MG tablet, Take 500 mg by mouth 2 (Two) Times a Day., Disp: , Rfl:   •  metoprolol succinate XL (TOPROL-XL) 50 MG 24 hr tablet, Take 1 tablet by mouth 2 (Two) Times a Day., Disp: 180 tablet, Rfl: 3  •  mirtazapine (REMERON) 7.5 MG half tablet, Take 7.5 mg by mouth Every Night., Disp: , Rfl:   •  Omega-3 Fatty Acids (OMEGA 3 PO), Take 2,000 mg by mouth Daily., Disp: , Rfl:   •  pravastatin (PRAVACHOL) 40 MG tablet, Take 1 tablet  "by mouth Daily., Disp: 90 tablet, Rfl: 3  •  Ubiquinone (ULTRA COQ10 PO), Take 1 tablet by mouth Daily., Disp: , Rfl:   •  warfarin (COUMADIN) 5 MG tablet, TAKE ONE TABLET BY MOUTH ONCE NIGHTLY OR AS DIRECTED BY THE COUMADIN CLINIC, Disp: 30 tablet, Rfl: 5    Physical Exam:  Vitals:    11/16/20 1033   BP: 128/80   Pulse: 80   SpO2: 94%   Weight: 78.4 kg (172 lb 12.8 oz)   Height: 172.7 cm (68\")   PainSc: 0-No pain     Current Pain Level: none  Pulse Ox: Normal  on room air  General: alert, appears stated age and cooperative     Body Habitus: well-nourished    HEENT: Head: Normocephalic, no lesions, without obvious abnormality. No arcus senilis, xanthelasma or xanthomas.    Neuro: alert, oriented x3  Pulses: 2+ and symmetric  JVP: Volume/Pulsation: Normal.  Normal waveforms.   Appropriate inspiratory decrease.  No Kussmaul's. No Jaylin's.   Carotid Exam: no bruit normal pulsation bilaterally   Carotid Volume: normal.     Respirations: no increased work of breathing   Chest:  Normal    Pulmonary:Normal   Precordium: Normal impulses. P2 is not palpable.  RV Heave: absent  LV Heave: absent  Mackey:  normal size and placement  Palpable S4: absent.  Heart rate: normal    Heart Rhythm: irregular     Heart Sounds: S1: normal  S2: normal  S3: absent   S4: absent  Opening Snap: absent    Pericardial Rub:  Absent: .    Abdomen:   Appearance: normal .  Palpation: Soft, non-tender to palpation, bowel sounds positive in all four quadrants; no guarding or rebound tenderness  Extremity: no edema.   LE Skin: no rashes  LE Hair:  normal  LE Pulses: well perfused with normal pulses in the distal extremities  Pallor on elevation: Absent. Rubor on dependency: None      DATA REVIEWED:     EKG. I personally reviewed and interpreted the EKG.  Atrial flutter with variable AV block.    ECG/EMG Results (all)     None        ---------------------------------------------------  TTE/DEENA:  Results for orders placed in visit on 01/02/18   Adult " Transthoracic Echo Complete W/ Cont if Necessary Per Protocol    Narrative · Left ventricular wall thickness is consistent with mild concentric   hypertrophy.  · Left ventricular systolic function is normal. Estimated EF = 53%.  · Right ventricular cavity is borderline dilated.  · Left atrial cavity size is severely dilated.  · Mild mitral valve regurgitation is present  · Mild tricuspid valve regurgitation is present.  · There is a moderate (1-2cm) pericardial effusion.  · No echocardiographic evidence of tamponade          --------------------------------------------------------------------------------------------------  LABS:     The CVD Risk score (Herbie et al., 2008) failed to calculate for the following reasons:    The 2008 CVD risk score is only valid for ages 30 to 74         Lab Results   Component Value Date    GLUCOSE 100 (H) 08/09/2019    BUN 21 08/09/2019    CREATININE 0.79 08/09/2019    EGFRIFNONA 70 08/09/2019    BCR 26.6 (H) 08/09/2019    K 4.8 08/09/2019    CO2 28.0 08/09/2019    CALCIUM 9.3 08/09/2019    ALBUMIN 4.20 08/09/2019    AST 50 (H) 08/09/2019    ALT 13 08/09/2019     Lab Results   Component Value Date    WBC 5.49 08/09/2019    HGB 14.2 08/09/2019    HCT 44.0 08/09/2019    MCV 89.6 08/09/2019     08/09/2019     Lab Results   Component Value Date    CHOL 165 06/13/2020    CHLPL 150 02/09/2016    TRIG 191 (H) 06/13/2020    HDL 49 06/13/2020    LDL 60 06/13/2020     Lab Results   Component Value Date    TSH 1.120 08/09/2019     Lab Results   Component Value Date    CKTOTAL 51 02/10/2014    CKMB 1.0 02/10/2014    TROPONINI <0.012 02/10/2014     Lab Results   Component Value Date    HGBA1C 6.5 (H) 09/02/2020     No results found for: DDIMER  Lab Results   Component Value Date    ALT 13 08/09/2019     Lab Results   Component Value Date    HGBA1C 6.5 (H) 09/02/2020    HGBA1C 6.7 (H) 06/13/2020    HGBA1C 6.30 (H) 08/09/2019     Lab Results   Component Value Date    CREATININE 0.79  08/09/2019     No results found for: IRON, TIBC, FERRITIN  Lab Results   Component Value Date    INR 2.80 10/21/2020    INR 2.40 09/23/2020    INR 2.50 09/02/2020    PROTIME 14.0 02/18/2016    PROTIME 15.2 02/14/2014    PROTIME 16.6 (H) 02/13/2014       Assessment/Plan     CAD prior stent placement doing well no chest pain.  She takes Imdur for mild angina and she is doing tolerating the isosorbide 30 mg daily, I asked her to exercise regularly     Chronic atrial fibrillation with sick sinus syndrome status post pacemaker on warfarin and rate controlled with Toprol-XL.  I discussed options of atrial flutter ablation and Novacks however patient wants to continue medical therapy and Coumadin at this point.  Following echocardiogram, if patient has developed a cardiomyopathy and will reconsider options for cardioversion and A. fib ablation.     Hyperlipidemia on pravastatin      Diabetes on metformin and A1c is good.     Hypothyroidism on Synthroid supplements    Orthopnea:  We will repeat echocardiogram to assess for left atrial size as well as congestive heart failure.      Prevention:  Patient's Body mass index is 26.27 kg/m². BMI is above normal parameters. Recommendations include: exercise counseling and nutrition counseling.      Kirsten Rosales  reports that she has quit smoking. She has never used smokeless tobacco..    AAA Screening:   Not needed    No follow-ups on file.          This document has been electronically signed by Jayda Myers MD on November 16, 2020 10:52 CST

## 2020-11-17 LAB
QT INTERVAL: 394 MS
QTC INTERVAL: 454 MS

## 2020-11-18 ENCOUNTER — ANTICOAGULATION VISIT (OUTPATIENT)
Dept: CARDIAC SURGERY | Facility: CLINIC | Age: 80
End: 2020-11-18

## 2020-11-18 DIAGNOSIS — I48.21 PERMANENT ATRIAL FIBRILLATION (HCC): ICD-10-CM

## 2020-11-18 DIAGNOSIS — Z79.01 LONG TERM CURRENT USE OF ANTICOAGULANT THERAPY: ICD-10-CM

## 2020-11-18 DIAGNOSIS — Z51.81 ENCOUNTER FOR THERAPEUTIC DRUG MONITORING: ICD-10-CM

## 2020-11-18 LAB — INR PPP: 2.5

## 2020-11-18 PROCEDURE — 93793 ANTICOAG MGMT PT WARFARIN: CPT | Performed by: NURSE PRACTITIONER

## 2020-11-18 NOTE — PROGRESS NOTES
Code 1  I spoke to pt over the phone who self-tested today.  Pt denies medication changes or bleeding issues.  Verified coumadin dose and instructed to continue the same.  Instructed pt to self-test again on Wed, Dec 16th.  Instructions verbalized.  Patient instructed regarding medication; results given and questions answered. Nutritional counseling given.  Dietary factors affecting therapy addressed.  Patient instructed to monitor for excessive bruising or bleeding.  Findings reported by Baron Herrera RN.   Today's INR is   Lab Results - Last 18 Months   Lab Units 11/18/20   INR  2.50

## 2020-12-09 DIAGNOSIS — I48.91 ATRIAL FIBRILLATION, UNSPECIFIED TYPE (HCC): Primary | ICD-10-CM

## 2020-12-09 RX ORDER — WARFARIN SODIUM 5 MG/1
TABLET ORAL
Qty: 30 TABLET | Refills: 5 | Status: SHIPPED | OUTPATIENT
Start: 2020-12-09 | End: 2021-09-23

## 2020-12-10 LAB
BH CV ECHO MEAS - ACS: 1.9 CM
BH CV ECHO MEAS - AO MAX PG (FULL): -0.43 MMHG
BH CV ECHO MEAS - AO MAX PG: 2.5 MMHG
BH CV ECHO MEAS - AO MEAN PG (FULL): -1 MMHG
BH CV ECHO MEAS - AO MEAN PG: 1 MMHG
BH CV ECHO MEAS - AO ROOT AREA (BSA CORRECTED): 1.7
BH CV ECHO MEAS - AO ROOT AREA: 8.6 CM^2
BH CV ECHO MEAS - AO ROOT DIAM: 3.3 CM
BH CV ECHO MEAS - AO V2 MAX: 78.9 CM/SEC
BH CV ECHO MEAS - AO V2 MEAN: 53.3 CM/SEC
BH CV ECHO MEAS - AO V2 VTI: 15 CM
BH CV ECHO MEAS - ASC AORTA: 3.3 CM
BH CV ECHO MEAS - AVA(I,A): 2.6 CM^2
BH CV ECHO MEAS - AVA(I,D): 2.6 CM^2
BH CV ECHO MEAS - AVA(V,A): 2.8 CM^2
BH CV ECHO MEAS - AVA(V,D): 2.8 CM^2
BH CV ECHO MEAS - BSA(HAYCOCK): 1.9 M^2
BH CV ECHO MEAS - BSA: 1.9 M^2
BH CV ECHO MEAS - BZI_BMI: 26.2 KILOGRAMS/M^2
BH CV ECHO MEAS - BZI_METRIC_HEIGHT: 172.7 CM
BH CV ECHO MEAS - BZI_METRIC_WEIGHT: 78 KG
BH CV ECHO MEAS - EDV(CUBED): 91.1 ML
BH CV ECHO MEAS - EDV(MOD-SP2): 52 ML
BH CV ECHO MEAS - EDV(MOD-SP4): 49.2 ML
BH CV ECHO MEAS - EDV(TEICH): 92.4 ML
BH CV ECHO MEAS - EF(CUBED): 69.5 %
BH CV ECHO MEAS - EF(MOD-SP2): 62.1 %
BH CV ECHO MEAS - EF(MOD-SP4): 50 %
BH CV ECHO MEAS - EF(TEICH): 61.2 %
BH CV ECHO MEAS - EPSS: 0.8 CM
BH CV ECHO MEAS - ESV(CUBED): 27.8 ML
BH CV ECHO MEAS - ESV(MOD-SP2): 19.7 ML
BH CV ECHO MEAS - ESV(MOD-SP4): 24.6 ML
BH CV ECHO MEAS - ESV(TEICH): 35.9 ML
BH CV ECHO MEAS - FS: 32.7 %
BH CV ECHO MEAS - IVS/LVPW: 0.83
BH CV ECHO MEAS - IVSD: 1 CM
BH CV ECHO MEAS - LA DIMENSION: 5.4 CM
BH CV ECHO MEAS - LA/AO: 1.6
BH CV ECHO MEAS - LV DIASTOLIC VOL/BSA (35-75): 25.7 ML/M^2
BH CV ECHO MEAS - LV MASS(C)D: 180.7 GRAMS
BH CV ECHO MEAS - LV MASS(C)DI: 94.2 GRAMS/M^2
BH CV ECHO MEAS - LV MAX PG: 2.9 MMHG
BH CV ECHO MEAS - LV MEAN PG: 2 MMHG
BH CV ECHO MEAS - LV SYSTOLIC VOL/BSA (12-30): 12.8 ML/M^2
BH CV ECHO MEAS - LV V1 MAX: 85.5 CM/SEC
BH CV ECHO MEAS - LV V1 MEAN: 56.4 CM/SEC
BH CV ECHO MEAS - LV V1 VTI: 15.6 CM
BH CV ECHO MEAS - LVIDD: 4.5 CM
BH CV ECHO MEAS - LVIDS: 3 CM
BH CV ECHO MEAS - LVLD AP2: 6.2 CM
BH CV ECHO MEAS - LVLD AP4: 5.8 CM
BH CV ECHO MEAS - LVLS AP2: 5.7 CM
BH CV ECHO MEAS - LVLS AP4: 5.3 CM
BH CV ECHO MEAS - LVOT AREA (M): 2.5 CM^2
BH CV ECHO MEAS - LVOT AREA: 2.5 CM^2
BH CV ECHO MEAS - LVOT DIAM: 1.8 CM
BH CV ECHO MEAS - LVPWD: 1.2 CM
BH CV ECHO MEAS - MR MAX PG: 107.7 MMHG
BH CV ECHO MEAS - MR MAX VEL: 519 CM/SEC
BH CV ECHO MEAS - MV A MAX VEL: 56.2 CM/SEC
BH CV ECHO MEAS - MV DEC SLOPE: 785 CM/SEC^2
BH CV ECHO MEAS - MV E MAX VEL: 155 CM/SEC
BH CV ECHO MEAS - MV E/A: 2.8
BH CV ECHO MEAS - MV MAX PG: 10.6 MMHG
BH CV ECHO MEAS - MV MEAN PG: 3 MMHG
BH CV ECHO MEAS - MV P1/2T MAX VEL: 167 CM/SEC
BH CV ECHO MEAS - MV P1/2T: 62.3 MSEC
BH CV ECHO MEAS - MV V2 MAX: 163 CM/SEC
BH CV ECHO MEAS - MV V2 MEAN: 75.5 CM/SEC
BH CV ECHO MEAS - MV V2 VTI: 34.1 CM
BH CV ECHO MEAS - MVA P1/2T LCG: 1.3 CM^2
BH CV ECHO MEAS - MVA(P1/2T): 3.5 CM^2
BH CV ECHO MEAS - MVA(VTI): 1.2 CM^2
BH CV ECHO MEAS - PA MAX PG (FULL): 0.62 MMHG
BH CV ECHO MEAS - PA MAX PG: 2.2 MMHG
BH CV ECHO MEAS - PA MEAN PG (FULL): 0 MMHG
BH CV ECHO MEAS - PA MEAN PG: 1 MMHG
BH CV ECHO MEAS - PA V2 MAX: 74.6 CM/SEC
BH CV ECHO MEAS - PA V2 MEAN: 54.7 CM/SEC
BH CV ECHO MEAS - PA V2 VTI: 15.9 CM
BH CV ECHO MEAS - RAP SYSTOLE: 3 MMHG
BH CV ECHO MEAS - RV MAX PG: 1.6 MMHG
BH CV ECHO MEAS - RV MEAN PG: 1 MMHG
BH CV ECHO MEAS - RV V1 MAX: 63.3 CM/SEC
BH CV ECHO MEAS - RV V1 MEAN: 42.5 CM/SEC
BH CV ECHO MEAS - RV V1 VTI: 12.8 CM
BH CV ECHO MEAS - RVDD: 3.2 CM
BH CV ECHO MEAS - RVSP: 31.1 MMHG
BH CV ECHO MEAS - SI(AO): 66.9 ML/M^2
BH CV ECHO MEAS - SI(CUBED): 33 ML/M^2
BH CV ECHO MEAS - SI(LVOT): 20.7 ML/M^2
BH CV ECHO MEAS - SI(MOD-SP2): 16.8 ML/M^2
BH CV ECHO MEAS - SI(MOD-SP4): 12.8 ML/M^2
BH CV ECHO MEAS - SI(TEICH): 29.5 ML/M^2
BH CV ECHO MEAS - SV(AO): 128.3 ML
BH CV ECHO MEAS - SV(CUBED): 63.3 ML
BH CV ECHO MEAS - SV(LVOT): 39.7 ML
BH CV ECHO MEAS - SV(MOD-SP2): 32.3 ML
BH CV ECHO MEAS - SV(MOD-SP4): 24.6 ML
BH CV ECHO MEAS - SV(TEICH): 56.6 ML
BH CV ECHO MEAS - TR MAX VEL: 265 CM/SEC
BH CV VAS BP LEFT ARM: NORMAL MMHG

## 2020-12-11 ENCOUNTER — DOCUMENTATION (OUTPATIENT)
Dept: CARDIOLOGY | Facility: CLINIC | Age: 80
End: 2020-12-11

## 2020-12-11 NOTE — PROGRESS NOTES
Echo results per Dr Myers    EF normal  Mitral valve with some calcifium with mild leaking    Recommendations. Optimal Blood pressure control     Patient verbalized understanding

## 2020-12-16 ENCOUNTER — ANTICOAGULATION VISIT (OUTPATIENT)
Dept: CARDIAC SURGERY | Facility: CLINIC | Age: 80
End: 2020-12-16

## 2020-12-16 DIAGNOSIS — I48.91 ATRIAL FIBRILLATION, UNSPECIFIED TYPE (HCC): ICD-10-CM

## 2020-12-16 DIAGNOSIS — Z51.81 ENCOUNTER FOR THERAPEUTIC DRUG MONITORING: ICD-10-CM

## 2020-12-16 DIAGNOSIS — Z79.01 LONG TERM CURRENT USE OF ANTICOAGULANT THERAPY: ICD-10-CM

## 2020-12-16 LAB — INR PPP: 2.3

## 2020-12-16 NOTE — PROGRESS NOTES
I spoke to pt over the phone who self-tested today.  Pt denies medication changes or bleeding issues.  Verified coumadin dose and instructed for pt to continue the same.  Instructed pt to self-test again on Wednesday, Jan 13th.  Instructions verbalized.  Patient instructed regarding medication; results given and questions answered. Nutritional counseling given.  Dietary factors affecting therapy addressed.  Patient instructed to monitor for excessive bruising or bleeding.  Findings reported by Baron Herrera RN.   Today's INR is   Lab Results - Last 18 Months   Lab Units 12/16/20   INR  2.30

## 2021-01-13 ENCOUNTER — ANTICOAGULATION VISIT (OUTPATIENT)
Dept: CARDIAC SURGERY | Facility: CLINIC | Age: 81
End: 2021-01-13

## 2021-01-13 DIAGNOSIS — I48.91 ATRIAL FIBRILLATION, UNSPECIFIED TYPE (HCC): ICD-10-CM

## 2021-01-13 DIAGNOSIS — Z51.81 ENCOUNTER FOR THERAPEUTIC DRUG MONITORING: ICD-10-CM

## 2021-01-13 DIAGNOSIS — Z79.01 LONG TERM CURRENT USE OF ANTICOAGULANT THERAPY: ICD-10-CM

## 2021-01-13 LAB — INR PPP: 2.2

## 2021-01-13 NOTE — PROGRESS NOTES
Spoke to pt over the phone who self-tested today.  Pt denies med changes or bleeding issues.  Verified coumadin dose and instructed pt continue the same.  Instructed to self-test again on Wed, Feb 10th.  Instructions verbalized.  Patient instructed regarding medication; results given and questions answered. Nutritional counseling given.  Dietary factors affecting therapy addressed.  Patient instructed to monitor for excessive bruising or bleeding.  Findings reported by Baron Herrera RN.   Today's INR is   Lab Results - Last 18 Months   Lab Units 01/13/21   INR  2.20

## 2021-02-03 ENCOUNTER — OFFICE VISIT (OUTPATIENT)
Dept: CARDIOLOGY | Facility: CLINIC | Age: 81
End: 2021-02-03

## 2021-02-03 VITALS
HEART RATE: 68 BPM | WEIGHT: 175 LBS | BODY MASS INDEX: 26.52 KG/M2 | OXYGEN SATURATION: 96 % | SYSTOLIC BLOOD PRESSURE: 122 MMHG | DIASTOLIC BLOOD PRESSURE: 80 MMHG | HEIGHT: 68 IN

## 2021-02-03 DIAGNOSIS — I48.91 ATRIAL FIBRILLATION, UNSPECIFIED TYPE (HCC): Primary | ICD-10-CM

## 2021-02-03 PROCEDURE — 93000 ELECTROCARDIOGRAM COMPLETE: CPT | Performed by: INTERNAL MEDICINE

## 2021-02-03 PROCEDURE — 99214 OFFICE O/P EST MOD 30 MIN: CPT | Performed by: INTERNAL MEDICINE

## 2021-02-03 RX ORDER — MAGNESIUM OXIDE 400 MG/1
400 TABLET ORAL
COMMUNITY
End: 2022-03-18 | Stop reason: SDUPTHER

## 2021-02-03 RX ORDER — ALBUTEROL SULFATE 90 UG/1
2 AEROSOL, METERED RESPIRATORY (INHALATION)
COMMUNITY
Start: 2020-11-27 | End: 2021-11-28

## 2021-02-03 RX ORDER — OXYBUTYNIN CHLORIDE 10 MG/1
TABLET, EXTENDED RELEASE ORAL
COMMUNITY
Start: 2020-11-30 | End: 2022-07-20

## 2021-02-04 RX ORDER — ISOSORBIDE MONONITRATE 30 MG/1
TABLET, EXTENDED RELEASE ORAL
Qty: 30 TABLET | Refills: 2 | Status: SHIPPED | OUTPATIENT
Start: 2021-02-04 | End: 2021-05-06

## 2021-02-07 LAB
QT INTERVAL: 388 MS
QTC INTERVAL: 412 MS

## 2021-02-09 ENCOUNTER — ANTICOAGULATION VISIT (OUTPATIENT)
Dept: CARDIAC SURGERY | Facility: CLINIC | Age: 81
End: 2021-02-09

## 2021-02-09 DIAGNOSIS — Z51.81 ENCOUNTER FOR THERAPEUTIC DRUG MONITORING: ICD-10-CM

## 2021-02-09 DIAGNOSIS — I48.91 ATRIAL FIBRILLATION, UNSPECIFIED TYPE (HCC): ICD-10-CM

## 2021-02-09 DIAGNOSIS — Z79.01 LONG TERM CURRENT USE OF ANTICOAGULANT THERAPY: ICD-10-CM

## 2021-02-09 LAB — INR PPP: 2

## 2021-02-09 NOTE — PROGRESS NOTES
Pt called to report INR from [facility]. Pt denied med changes or bleeding problems. Pt verified current dose. Pt was instructed to continue current dose and retest on 3/9; pt repeated back correctly. Patient instructed regarding medication; results given and questions answered. Nutritional counseling given.  Dietary factors affecting therapy addressed.  Patient instructed to monitor for excessive bruising or bleeding. Findings reported by Annabelle Davis RN.    Today's INR is   Lab Results - Last 18 Months   Lab Units 02/09/21   INR  2.00

## 2021-03-16 ENCOUNTER — ANTICOAGULATION VISIT (OUTPATIENT)
Dept: CARDIAC SURGERY | Facility: CLINIC | Age: 81
End: 2021-03-16

## 2021-03-16 DIAGNOSIS — I48.91 ATRIAL FIBRILLATION, UNSPECIFIED TYPE (HCC): ICD-10-CM

## 2021-03-16 DIAGNOSIS — Z79.01 LONG TERM CURRENT USE OF ANTICOAGULANT THERAPY: ICD-10-CM

## 2021-03-16 DIAGNOSIS — Z51.81 ENCOUNTER FOR THERAPEUTIC DRUG MONITORING: ICD-10-CM

## 2021-03-16 LAB — INR PPP: 1.8

## 2021-03-16 NOTE — PROGRESS NOTES
Pt called to report overdue INR from [facility]. Pt denies med changes or bleeding problems. Pt verified dosing. Dose adjusted today only and pt instructed to hold green veggies 2-3 days; pt verbalized. Pt instructed to recheck INR on Tuesday March 30; she repeated back correctly. Patient instructed regarding medication; results given and questions answered. Nutritional counseling given.  Dietary factors affecting therapy addressed.  Patient instructed to monitor for excessive bruising or bleeding. Findings reported by Annabelle Davis RN.    Today's INR is   Lab Results - Last 18 Months   Lab Units 03/16/21  0000   INR  1.80

## 2021-03-17 ENCOUNTER — OFFICE VISIT (OUTPATIENT)
Dept: SLEEP MEDICINE | Facility: HOSPITAL | Age: 81
End: 2021-03-17

## 2021-03-17 VITALS
OXYGEN SATURATION: 98 % | HEIGHT: 68 IN | BODY MASS INDEX: 27.28 KG/M2 | DIASTOLIC BLOOD PRESSURE: 85 MMHG | SYSTOLIC BLOOD PRESSURE: 160 MMHG | WEIGHT: 180 LBS | HEART RATE: 107 BPM

## 2021-03-17 DIAGNOSIS — J47.9 BRONCHIECTASIS WITHOUT COMPLICATION (HCC): ICD-10-CM

## 2021-03-17 DIAGNOSIS — G47.33 OSA (OBSTRUCTIVE SLEEP APNEA): Primary | ICD-10-CM

## 2021-03-17 DIAGNOSIS — G47.09 OTHER INSOMNIA: ICD-10-CM

## 2021-03-17 DIAGNOSIS — R06.01 ORTHOPNEA: ICD-10-CM

## 2021-03-17 PROCEDURE — 99202 OFFICE O/P NEW SF 15 MIN: CPT | Performed by: INTERNAL MEDICINE

## 2021-03-17 RX ORDER — ZOLPIDEM TARTRATE 5 MG/1
5 TABLET ORAL NIGHTLY PRN
Qty: 1 TABLET | Refills: 0 | Status: SHIPPED | OUTPATIENT
Start: 2021-03-17 | End: 2021-09-15

## 2021-03-17 NOTE — PROGRESS NOTES
"      [facility]  [address] [place] [address]  Phone: [phone]  Fax: [phone]      New Patient Sleep Medicine Consultation  Sleep Visit Only    Encounter Date: 3/17/2021         Patient's PCP: Donis Leija MD  Referring provider: Dre Rasheed,*  Reason for consultation chief complaint: Orthopnea and Sleep disturbances    CHIEF COMPLAINT:  :   Chief Complaint   Patient presents with   • Fatigue     neck--- 14\"   • Unable To Fall Asleep   • Unable To Stay Asleep   • Nocturia   • Dry Mouth   • Snoring        Encounter Date: 3/17/2021         HISTORY OF PRESENT ILLNESS:  Referred by Provider for Sleep Evaluation for Sleep Disturbances    Pleasant 81-year-old lady that was referred by , patient's cardiology for a sleep apnea evaluation,  patient with multiple cardiac risks and sleep complaints including but not limited to sleep fragmentation and insomnia, patient with history of coronary artery disease, atrial fibrillation sick sinus syndrome, patient on chronic anticoagulation and history of pacemaker implanted, patient with hypothyroidism, type 2 diabetes mellitus, patient has diastolic dysfunction, history of sick sinus syndrome, patient with a heavy smoking history and 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   CATARINA Rosales is a 81 y.o. female whose bedtime is ~ 11-12 pm. She  falls asleep after 60 minutes, and is up 1-2 times per night. She wakes up ~ 8 am. She endorses 5-6 hours of sleep. She drinks 1 cups of coffee, 0 teas, and 0 sodas per day. She drinks 0 alcoholic beverages per week.  Patient reports a sleep fragmentation and wake after sleep onset can be more than 30 minutes, patient denies caffeine seeking behavior, and denies family history of a sleep apnea.    Kirsten Rosales admits to snoring, unrestful sleep, High blod pressure, excessive daytime sleepiness, sleeping " less than 6 hours per night, memory loss and Up to the bathroom at night. She denies cataplexy, sleep paralysis, or hypnagogic hallucinations. She does not take sedatives or hypnotics. She has No sleepiness with driving. She does not nap.  Patient denies having a motor vehicle accident related to hypersomnia.  Patient due to the COVID-19 pandemic is not exercising and she has expressed her energy level is down, patient reported history of falls.    Patient reports fatigue, lack of energy, no stridor, no epistaxis, no sinus pressure, she reports shortness of breath and dyspnea on exertion and occasional cough, no chest pain she does report orthopnea, no vomiting, no hematemesis, no seizure, no syncope.     She used to smoke, but has not smoked for years. Smoking history: smoked 1/2 ppds 20 years  Quit in 1991  Nicotine vaping: no  No POT smoker: no    Occupation:   at [employer]    No family history of JOSEPH    PAST MEDICAL AND SURGICAL HISTORIES:    Past Medical History:   Diagnosis Date   • Atherosclerotic heart disease of native coronary artery with unspecified angina pectoris (CMS/HCC)    • Atrial fibrillation (CMS/HCC)    • Breath shortness    • CAD (coronary artery disease)    • Hyperlipidemia    • Long term (current) use of anticoagulants    • Sick sinus syndrome (CMS/HCC)    • Unspecified hypothyroidism      Past Surgical History:   Procedure Laterality Date   • BACK SURGERY     • CARDIAC CATHETERIZATION      2/2016   • CARDIAC PACEMAKER PLACEMENT     • CARDIOVERSION     • CATARACT EXTRACTION W/ INTRAOCULAR LENS IMPLANT Right 3/16/2018    Procedure: CATARACT PHACO EXTRACTION WITH INTRAOCULAR LENS IMPLANT;  Surgeon: Umesh Thibodeaux MD;  Location: [facility];  Service: Ophthalmology   • CATARACT EXTRACTION W/ INTRAOCULAR LENS IMPLANT Left 3/23/2018    Procedure: CATARACT PHACO EXTRACTION WITH INTRAOCULAR LENS IMPLANT;  Surgeon: Umesh Thibodeaux MD;  Location: [facility];  Service:  Ophthalmology   • COLONOSCOPY N/A 1/4/2018    Procedure: COLONOSCOPY;  Surgeon: Albino Juan DO;  Location: [facility] ENDOSCOPY;  Service:    • CORONARY ANGIOPLASTY     • PACEMAKER IMPLANTATION         Allergies   Allergen Reactions   • Aspirin Hives   • Celebrex [Celecoxib] Hives   • Rythmol [Propafenone] Nausea And Vomiting         No family history on file.  Social History     Socioeconomic History   • Marital status:      Spouse name: Not on file   • Number of children: Not on file   • Years of education: Not on file   • Highest education level: Not on file   Tobacco Use   • Smoking status: Former Smoker   • Smokeless tobacco: Never Used   • Tobacco comment: man 30 years ago   Substance and Sexual Activity   • Alcohol use: No   • Drug use: No   • Sexual activity: Defer       MEDICATIONS:    Current Outpatient Medications:   •  albuterol sulfate  (90 Base) MCG/ACT inhaler, Inhale 2 puffs., Disp: , Rfl:   •  furosemide (LASIX) 20 MG tablet, TAKE ONE TABLET BY MOUTH EVERY MORNING, Disp: , Rfl:   •  isosorbide mononitrate (IMDUR) 30 MG 24 hr tablet, TAKE ONE TABLET BY MOUTH DAILY, Disp: 30 tablet, Rfl: 2  •  levothyroxine (SYNTHROID, LEVOTHROID) 112 MCG tablet, Take 112 mcg by mouth Daily., Disp: , Rfl:   •  magnesium oxide (MAG-OX) 400 MG tablet, Take 400 mg by mouth., Disp: , Rfl:   •  metFORMIN (GLUCOPHAGE) 500 MG tablet, Take 500 mg by mouth 2 (Two) Times a Day., Disp: , Rfl:   •  metoprolol succinate XL (TOPROL-XL) 50 MG 24 hr tablet, Take 1 tablet by mouth 2 (Two) Times a Day., Disp: 180 tablet, Rfl: 3  •  Omega-3 Fatty Acids (OMEGA 3 PO), Take 2,000 mg by mouth Daily., Disp: , Rfl:   •  oxybutynin XL (DITROPAN-XL) 10 MG 24 hr tablet, TAKE ONE TABLET BY MOUTH DAILY, Disp: , Rfl:   •  pravastatin (PRAVACHOL) 40 MG tablet, Take 1 tablet by mouth Daily., Disp: 90 tablet, Rfl: 3  •  Ubiquinone (ULTRA COQ10 PO), Take 1 tablet by mouth Daily., Disp: , Rfl:   •  warfarin (COUMADIN) 5 MG tablet, TAKE  "ONE TABLET BY MOUTH ONCE NIGHTLY OR AS DIRECTED BY THE COUMADIN CLINIC, Disp: 30 tablet, Rfl: 5  •  zolpidem (Ambien) 5 MG tablet, Take 1 tablet by mouth At Night As Needed for Sleep (To bring Pill Night of Sleep Study) for up to 1 dose., Disp: 1 tablet, Rfl: 0    Review of Systems  As history of present illness    OBJECTIVE:    Vitals:    03/17/21 1312   BP: 160/85   Pulse: 107   SpO2: 98%         03/17/21  1312   Weight: 81.6 kg (180 lb)     Body mass index is 27.38 kg/m². Patient's Body mass index is 27.38 kg/m².  Neck circumference: 14\"  ESS: 2 ?    PHYSICAL EXAM:  Physical Exam  Constitutional:       General: She is not in acute distress.     Appearance: Normal appearance.   HENT:      Head: Normocephalic and atraumatic.      Nose: Nose normal. No congestion.      Mouth/Throat:      Mouth: Mucous membranes are moist.      Pharynx: Oropharynx is clear. No oropharyngeal exudate.      Comments: Mildly Crowded  Eyes:      General: No scleral icterus.  Cardiovascular:      Rate and Rhythm: Normal rate and regular rhythm.      Heart sounds: No murmur heard.   No friction rub.   Pulmonary:      Effort: Pulmonary effort is normal.      Breath sounds: Normal breath sounds. No wheezing.   Chest:      Chest wall: No tenderness.   Abdominal:      General: Bowel sounds are normal.      Palpations: Abdomen is soft.   Musculoskeletal:         General: Deformity present. No swelling or tenderness.      Comments: PP+, No edema, No Cyanosis   Lymphadenopathy:      Cervical: No cervical adenopathy.   Skin:     Coloration: Skin is not jaundiced.   Neurological:      General: No focal deficit present.      Mental Status: She is alert and oriented to person, place, and time.   Psychiatric:         Mood and Affect: Mood normal.         Thought Content: Thought content normal.       IMPRESSION AND RECOMMENDATIONS:  Pleasant 81-year-old lady who looks very well for stated age, referred by cardiologist for evaluation of sleep apnea, " patient with multiple cardiac risk factors and complaining of orthopnea with no evidence of decompensated heart failure, possible due to dynamic airway collapse on flat position, patient with smoking history, suspected COPD and nocturnal oxygen desaturation, that may trigger tachybradycardia syndrome, will be set toward the diagnostic polysomnogram for obstructive or central event as well to monitor oxygen saturation overnight.    1. JOSPEH (obstructive sleep apnea)-Suspected  - We will request diagnostic polysomnogram, we will give Ambien 5 mg to facilitate first night effect, patient with complaint of maintenance insomnia  - Polysomnography 4 or More Parameters; Future  - zolpidem (Ambien) 5 MG tablet; Take 1 tablet by mouth At Night As Needed for Sleep (To bring Pill Night of Sleep Study) for up to 1 dose.  Dispense: 1 tablet; Refill: 0  - I have explained the patient the nature and intention of the study, education regarding what is the sleep apnea was done.    2. Other insomnia  -Possible related to JOSEPH due to a sleep fragmentation and adrenergic state    3. Bronchiectasis without complication (CMS/[facility])  -Recommend pulmonologist follow-up/evaluation as per PCP choice and discretion  -Patient was advised to exercise as tolerated I discussed with her regarding fall precautions, advised at least to get a hiking pole as walking support  -Suspect underlying COPD    4. Orthopnea, patient with diastolic dysfunction,   - she sleep with 3 pillows, possible of JOSEPH, she does not seem to be on cardiac decompensation at this visit    Contraindications to home sleep test: Permanent pacemaker and Hypoventilation    FOLLOW UP:  Patient has been instructed to call with any question or concerns, otherwise we will see her back in 4 to 5 weeks with a sleep study result  Return in about 4 weeks (around 4/14/2021) for Follow up after study.      Thank you very much for letting us to participate in the care of your  patient.      Daniel Lackey MD, FACP, FCCP  Diplomate in Pulmonary & Sleep Medicine    This document has been electronically signed by Daniel Lackey MD on March 17, 2021 14:13 CDT      Instructions provided as documented in the After Visit Summary.  The patient indicated understanding of the diagnosis and agreed with the plan of care    EMR Dragon/Transcription disclaimer:   Some of this note may be an electronic transcription/translation of spoken language to printed text. The electronic translation of spoken language may permit erroneous, or at times, nonsensical words or phrases to be inadvertently transcribed; Although I have reviewed the note for such errors, some may still exist.      CC: Donis Leija MD Heatherly, Steven Joel,*

## 2021-03-30 ENCOUNTER — ANTICOAGULATION VISIT (OUTPATIENT)
Dept: CARDIAC SURGERY | Facility: CLINIC | Age: 81
End: 2021-03-30

## 2021-03-30 DIAGNOSIS — Z51.81 ENCOUNTER FOR THERAPEUTIC DRUG MONITORING: ICD-10-CM

## 2021-03-30 DIAGNOSIS — Z79.01 LONG TERM CURRENT USE OF ANTICOAGULANT THERAPY: ICD-10-CM

## 2021-03-30 DIAGNOSIS — I48.91 ATRIAL FIBRILLATION, UNSPECIFIED TYPE (HCC): ICD-10-CM

## 2021-03-30 LAB — INR PPP: 2.1

## 2021-03-30 NOTE — PROGRESS NOTES
Spoke to pt over the phone who self-tested today.  Pt denies medication changes or bleeding issues.  Verified coumadin dose and instructed pt to self-test again on Tuesday, April 27.  Pt verbalized instructions.  Patient instructed regarding medication; results given and questions answered. Nutritional counseling given.  Dietary factors affecting therapy addressed.  Patient instructed to monitor for excessive bruising or bleeding.  Findings reported by Baron Herrera RN.   Today's INR is   Lab Results - Last 18 Months   Lab Units 03/30/21  0000   INR  2.10

## 2021-04-20 PROCEDURE — 93294 REM INTERROG EVL PM/LDLS PM: CPT | Performed by: NURSE PRACTITIONER

## 2021-04-20 PROCEDURE — 93296 REM INTERROG EVL PM/IDS: CPT | Performed by: NURSE PRACTITIONER

## 2021-04-21 ENCOUNTER — HOSPITAL ENCOUNTER (OUTPATIENT)
Dept: SLEEP MEDICINE | Facility: HOSPITAL | Age: 81
Discharge: HOME | End: 2021-04-21
Admitting: INTERNAL MEDICINE

## 2021-04-21 DIAGNOSIS — G47.33 OSA (OBSTRUCTIVE SLEEP APNEA): ICD-10-CM

## 2021-04-21 PROCEDURE — 95810 POLYSOM 6/> YRS 4/> PARAM: CPT | Performed by: INTERNAL MEDICINE

## 2021-04-21 PROCEDURE — 95810 POLYSOM 6/> YRS 4/> PARAM: CPT

## 2021-04-27 ENCOUNTER — TELEPHONE (OUTPATIENT)
Dept: SLEEP MEDICINE | Facility: HOSPITAL | Age: 81
End: 2021-04-27

## 2021-04-27 ENCOUNTER — OFFICE VISIT (OUTPATIENT)
Dept: SLEEP MEDICINE | Facility: HOSPITAL | Age: 81
End: 2021-04-27

## 2021-04-27 ENCOUNTER — ANTICOAGULATION VISIT (OUTPATIENT)
Dept: CARDIAC SURGERY | Facility: CLINIC | Age: 81
End: 2021-04-27

## 2021-04-27 VITALS
OXYGEN SATURATION: 95 % | BODY MASS INDEX: 27.28 KG/M2 | WEIGHT: 180 LBS | HEART RATE: 76 BPM | DIASTOLIC BLOOD PRESSURE: 71 MMHG | HEIGHT: 68 IN | SYSTOLIC BLOOD PRESSURE: 131 MMHG

## 2021-04-27 DIAGNOSIS — G47.33 OSA (OBSTRUCTIVE SLEEP APNEA): Primary | ICD-10-CM

## 2021-04-27 DIAGNOSIS — Z51.81 ENCOUNTER FOR THERAPEUTIC DRUG MONITORING: ICD-10-CM

## 2021-04-27 DIAGNOSIS — I48.91 ATRIAL FIBRILLATION, UNSPECIFIED TYPE (HCC): ICD-10-CM

## 2021-04-27 DIAGNOSIS — Z79.01 LONG TERM CURRENT USE OF ANTICOAGULANT THERAPY: Primary | ICD-10-CM

## 2021-04-27 DIAGNOSIS — F51.04 PSYCHOPHYSIOLOGICAL INSOMNIA: ICD-10-CM

## 2021-04-27 LAB — INR PPP: 1.8

## 2021-04-27 PROCEDURE — 93793 ANTICOAG MGMT PT WARFARIN: CPT | Performed by: NURSE PRACTITIONER

## 2021-04-27 PROCEDURE — 99213 OFFICE O/P EST LOW 20 MIN: CPT | Performed by: NURSE PRACTITIONER

## 2021-04-27 NOTE — PROGRESS NOTES
I spoke to pt over the phone who self-tested today.  Pt denies missed coumadin doses or consuming too much green.  Pt states medications have not changed; no bleeding issues.  Adjusted coumadin dose and instructed pt to limit green intake for two days.  Instructed pt to self-test again on Tuesday, May 4th.  Pt verbalized instructions.  Patient instructed regarding medication; results given and questions answered. Nutritional counseling given.  Dietary factors affecting therapy addressed.  Patient instructed to monitor for excessive bruising or bleeding.  Findings reported by Baron Herrera RN.   Today's INR is   Lab Results - Last 18 Months   Lab Units 04/27/21  0000   INR  1.80

## 2021-04-27 NOTE — TELEPHONE ENCOUNTER
Called in one time script for Ambien 5mg 1 tablet PO @ hs.  Tablet is to be taken in lab prior to sleep study.  Quantity 1 tablet no refills per Dr. KELLY Lackey

## 2021-04-27 NOTE — PROGRESS NOTES
Sleep Clinic Follow-Up    CHIEF COMPLAINT: follow up sleep testing    LAST OV: 03/17/2021    HPI:  The patient is a 81 y.o. female.  I discussed the results of the recent diagnostic polysomnography performed on 04/21/2021.  The AHI/HANNAH was 30.  The patient had a periodic limb movement index of 74.1. She denies restless leg symptoms.  The patient's EKG showed atrial fibrillation with PVCs. Snoring was audible. Mean O2 was 94% with a erwin of 87%.       INTERVAL MEDICAL HISTORY: No change since last office visit in regard to the patient's bedtime routine, medications, or diagnosis.    PAP Data:  Currently not on therapy       Bed time: 6826-8614  Sleep latency: 0-60 minutes  Number of times awakens during the night: 2-3  Wake time: 0800  Estimated total sleep time at night: 5 hours  Caffeine intake: up to 4 glasses of tea daily   Alcohol intake: denies   Nap time: denies    Sleepiness with Driving: denies     Reno Score: 2      MEDICATIONS:   Current Outpatient Medications:   •  albuterol sulfate  (90 Base) MCG/ACT inhaler, Inhale 2 puffs., Disp: , Rfl:   •  furosemide (LASIX) 20 MG tablet, TAKE ONE TABLET BY MOUTH EVERY MORNING, Disp: , Rfl:   •  isosorbide mononitrate (IMDUR) 30 MG 24 hr tablet, TAKE ONE TABLET BY MOUTH DAILY, Disp: 30 tablet, Rfl: 2  •  levothyroxine (SYNTHROID, LEVOTHROID) 112 MCG tablet, Take 112 mcg by mouth Daily., Disp: , Rfl:   •  magnesium oxide (MAG-OX) 400 MG tablet, Take 400 mg by mouth., Disp: , Rfl:   •  metFORMIN (GLUCOPHAGE) 500 MG tablet, Take 500 mg by mouth 2 (Two) Times a Day., Disp: , Rfl:   •  metoprolol succinate XL (TOPROL-XL) 50 MG 24 hr tablet, Take 1 tablet by mouth 2 (Two) Times a Day., Disp: 180 tablet, Rfl: 3  •  Omega-3 Fatty Acids (OMEGA 3 PO), Take 2,000 mg by mouth Daily., Disp: , Rfl:   •  oxybutynin XL (DITROPAN-XL) 10 MG 24 hr tablet, TAKE ONE TABLET BY MOUTH DAILY, Disp: , Rfl:   •  pravastatin (PRAVACHOL) 40 MG tablet, Take 1 tablet by mouth Daily.,  Disp: 90 tablet, Rfl: 3  •  Ubiquinone (ULTRA COQ10 PO), Take 1 tablet by mouth Daily., Disp: , Rfl:   •  warfarin (COUMADIN) 5 MG tablet, TAKE ONE TABLET BY MOUTH ONCE NIGHTLY OR AS DIRECTED BY THE COUMADIN CLINIC, Disp: 30 tablet, Rfl: 5  •  zolpidem (Ambien) 5 MG tablet, Take 1 tablet by mouth At Night As Needed for Sleep (To bring Pill Night of Sleep Study) for up to 1 dose., Disp: 1 tablet, Rfl: 0    PHYSICAL EXAM  Vitals:    04/27/21 1006   BP: 131/71   Pulse: 76   SpO2: 95%           04/27/21  1006   Weight: 81.6 kg (180 lb)       Body mass index is 27.38 kg/m². Patient's Body mass index is 27.38 kg/m². BMI is above normal parameters. Recommendations include: referral to primary care.      Gen:  No acute distress heard in voice, alert, oriented      Assessment and Plan:    1. Severe obstructive sleep apnea - Established, stable (1)  1. In lab titration- COVID swab prior to study   2. Sleepy driving tips   3. Return to clinic 2 weeks after study with results   2.   Insomnia - sleep onset and or maintenance - Established, stable (1)   1.  Sleep hygiene    2.  Address after started on PAP therapy    3.  Ambien 5 mg on night of study - she had 5 mg Ambien prior to PSG and states it helped her sleep     3.  Sick sinus syndrome   4. Afib   5. CAD     The sleep results were discussed in detail with the patient.  The risks of untreated sleep apnea were reviewed.  Treatment options for sleep apnea were discussed in detail. The patient wants to pursue PAP therapy and agreeable to titration study.  I counseled patient on sleep hygiene, including regular sleep wake schedule and stimulus control therapy, the importance of weight reduction, safe driving and abstaining from smoking and alcohol consumption, as well as other sedatives.     All of the patient's questions were answered. She states understanding and agreement with my assessment and plan as above.     I obtained a brief history from the patient, reviewed the  medical problems and current medications, and made medical decisions regarding treatment based on that information. Total visit time 25 min, with total of 20 minutes spent counseling patient regarding: PAP therapy, PAP maintenance, Lifestyle modifications, Sleep hygiene, Study results and Further testing.       RTC 2 weeks after testing   She agrees to return sooner on a prn basis if sx change.           This document has been electronically signed by MICKIE Newell on April 27, 2021 10:08 CDT            CC: Donis Leija MD          No ref. provider found

## 2021-05-04 ENCOUNTER — ANTICOAGULATION VISIT (OUTPATIENT)
Dept: CARDIAC SURGERY | Facility: CLINIC | Age: 81
End: 2021-05-04

## 2021-05-04 DIAGNOSIS — Z79.01 LONG TERM CURRENT USE OF ANTICOAGULANT THERAPY: Primary | ICD-10-CM

## 2021-05-04 DIAGNOSIS — Z51.81 ENCOUNTER FOR THERAPEUTIC DRUG MONITORING: ICD-10-CM

## 2021-05-04 DIAGNOSIS — I48.91 ATRIAL FIBRILLATION, UNSPECIFIED TYPE (HCC): ICD-10-CM

## 2021-05-04 LAB — INR PPP: 2

## 2021-05-04 NOTE — PROGRESS NOTES
I spoke to pt over the phone who self-tested today.  Pt denies medication changes or bleeding issues.  Verified coumadin dose and instructed for pt to continue the same.  Instructed pt to self-test again on Tuesday, May 18th.  Instructions verbalized.  Patient instructed regarding medication; results given and questions answered. Nutritional counseling given.  Dietary factors affecting therapy addressed.  Patient instructed to monitor for excessive bruising or bleeding.  Findings reported by Baron Herrera RN.   Today's INR is   Lab Results - Last 18 Months   Lab Units 05/04/21  0000   INR  2.00

## 2021-05-06 RX ORDER — ISOSORBIDE MONONITRATE 30 MG/1
TABLET, EXTENDED RELEASE ORAL
Qty: 90 TABLET | Refills: 3 | Status: SHIPPED | OUTPATIENT
Start: 2021-05-06 | End: 2022-05-20 | Stop reason: SDUPTHER

## 2021-05-18 ENCOUNTER — ANTICOAGULATION VISIT (OUTPATIENT)
Dept: CARDIAC SURGERY | Facility: CLINIC | Age: 81
End: 2021-05-18

## 2021-05-18 DIAGNOSIS — I48.91 ATRIAL FIBRILLATION, UNSPECIFIED TYPE (HCC): ICD-10-CM

## 2021-05-18 DIAGNOSIS — Z51.81 ENCOUNTER FOR THERAPEUTIC DRUG MONITORING: ICD-10-CM

## 2021-05-18 DIAGNOSIS — Z79.01 LONG TERM CURRENT USE OF ANTICOAGULANT THERAPY: Primary | ICD-10-CM

## 2021-05-18 LAB — INR PPP: 1.9

## 2021-05-18 PROCEDURE — 93793 ANTICOAG MGMT PT WARFARIN: CPT | Performed by: NURSE PRACTITIONER

## 2021-05-18 NOTE — PROGRESS NOTES
I spoke to pt over the phone who self-tested today.  Pt denies missed coumadin doses or consuming too much green.  Pt states medications have not changed; no bleeding issues.  Adjusted coumadin dose for today only and instructed pt to limit green intake for two days.  Instructed pt to self-test again on Wednesday, June 2nd.  Instructions verbalized.  Patient instructed regarding medication; results given and questions answered. Nutritional counseling given.  Dietary factors affecting therapy addressed.  Patient instructed to monitor for excessive bruising or bleeding.  Findings reported by Baron Herrera RN.   Today's INR is   Lab Results - Last 18 Months   Lab Units 05/18/21  0000   INR  1.90

## 2021-05-25 ENCOUNTER — LAB (OUTPATIENT)
Dept: LAB | Facility: HOSPITAL | Age: 81
End: 2021-05-25

## 2021-05-25 DIAGNOSIS — G47.33 OSA (OBSTRUCTIVE SLEEP APNEA): ICD-10-CM

## 2021-05-25 LAB — SARS-COV-2 N GENE RESP QL NAA+PROBE: NOT DETECTED

## 2021-05-25 PROCEDURE — 87635 SARS-COV-2 COVID-19 AMP PRB: CPT

## 2021-05-25 PROCEDURE — C9803 HOPD COVID-19 SPEC COLLECT: HCPCS

## 2021-05-27 ENCOUNTER — HOSPITAL ENCOUNTER (OUTPATIENT)
Dept: SLEEP MEDICINE | Facility: HOSPITAL | Age: 81
Discharge: HOME | End: 2021-05-27
Admitting: NURSE PRACTITIONER

## 2021-05-27 DIAGNOSIS — G47.33 OSA (OBSTRUCTIVE SLEEP APNEA): ICD-10-CM

## 2021-05-27 PROCEDURE — 95811 POLYSOM 6/>YRS CPAP 4/> PARM: CPT | Performed by: INTERNAL MEDICINE

## 2021-05-27 PROCEDURE — 95811 POLYSOM 6/>YRS CPAP 4/> PARM: CPT

## 2021-06-03 ENCOUNTER — ANTICOAGULATION VISIT (OUTPATIENT)
Dept: CARDIAC SURGERY | Facility: CLINIC | Age: 81
End: 2021-06-03

## 2021-06-03 DIAGNOSIS — Z79.01 LONG TERM CURRENT USE OF ANTICOAGULANT THERAPY: Primary | ICD-10-CM

## 2021-06-03 DIAGNOSIS — Z51.81 ENCOUNTER FOR THERAPEUTIC DRUG MONITORING: ICD-10-CM

## 2021-06-03 DIAGNOSIS — I48.91 ATRIAL FIBRILLATION, UNSPECIFIED TYPE (HCC): ICD-10-CM

## 2021-06-03 LAB — INR PPP: 1.9

## 2021-06-03 NOTE — PROGRESS NOTES
Pt called to report INR from [facility]. Pt denied med changes, bleeding problems, missed doses, or excessive vitamin K intake. Pt verified current dose. Pt was instructed on new dosing plan and to recheck INR next Thursday Maribel 10; she repeated back correctly. Patient instructed regarding medication; results given and questions answered. Nutritional counseling given.  Dietary factors affecting therapy addressed.  Patient instructed to monitor for excessive bruising or bleeding. Findings reported by Annabelle Davis RN.    Today's INR is   Lab Results - Last 18 Months   Lab Units 06/03/21  0000   INR  1.90

## 2021-06-07 RX ORDER — METOPROLOL SUCCINATE 50 MG/1
TABLET, EXTENDED RELEASE ORAL
Qty: 180 TABLET | Refills: 2 | Status: SHIPPED | OUTPATIENT
Start: 2021-06-07 | End: 2022-02-24

## 2021-06-10 ENCOUNTER — OFFICE VISIT (OUTPATIENT)
Dept: SLEEP MEDICINE | Facility: HOSPITAL | Age: 81
End: 2021-06-10

## 2021-06-10 ENCOUNTER — ANTICOAGULATION VISIT (OUTPATIENT)
Dept: CARDIAC SURGERY | Facility: CLINIC | Age: 81
End: 2021-06-10

## 2021-06-10 VITALS
BODY MASS INDEX: 27.84 KG/M2 | WEIGHT: 183.7 LBS | HEIGHT: 68 IN | HEART RATE: 68 BPM | SYSTOLIC BLOOD PRESSURE: 144 MMHG | OXYGEN SATURATION: 95 % | DIASTOLIC BLOOD PRESSURE: 81 MMHG

## 2021-06-10 DIAGNOSIS — G47.33 OBSTRUCTIVE SLEEP APNEA, ADULT: Primary | ICD-10-CM

## 2021-06-10 DIAGNOSIS — Z51.81 ENCOUNTER FOR THERAPEUTIC DRUG MONITORING: ICD-10-CM

## 2021-06-10 DIAGNOSIS — Z79.01 LONG TERM CURRENT USE OF ANTICOAGULANT THERAPY: Primary | ICD-10-CM

## 2021-06-10 DIAGNOSIS — I48.91 ATRIAL FIBRILLATION, UNSPECIFIED TYPE (HCC): ICD-10-CM

## 2021-06-10 DIAGNOSIS — G47.31 COMPLEX SLEEP APNEA SYNDROME: ICD-10-CM

## 2021-06-10 LAB — INR PPP: 2.3

## 2021-06-10 PROCEDURE — 99214 OFFICE O/P EST MOD 30 MIN: CPT | Performed by: NURSE PRACTITIONER

## 2021-06-10 NOTE — PROGRESS NOTES
Sleep Clinic Follow-Up    CHIEF COMPLAINT: follow up sleep testing    LAST OV: 04/27/2021    HPI:  The patient is a 81 y.o. female.  I discussed the results of the recent CPAP titration performed on 05/27/2021. Report reviewed in detail and scanned into chart.  Recommendations include BiPAP 17/8 with breath rate of 12/min. The patient did not have any significant cardiac arrhythmias. Snoring was eliminated. Mean SaO2 was 90% with a erwin of 83%.       INTERVAL MEDICAL HISTORY: No change since last office visit in regard to the patient's bedtime routine, medications, or diagnosis.    PAP Data:  Currently not on therapy   Mask type: mask per patient's choice   DME: [facility]       [place] Score: 2     MEDICATIONS:   Current Outpatient Medications:   •  albuterol sulfate  (90 Base) MCG/ACT inhaler, Inhale 2 puffs., Disp: , Rfl:   •  furosemide (LASIX) 20 MG tablet, TAKE ONE TABLET BY MOUTH EVERY MORNING, Disp: , Rfl:   •  isosorbide mononitrate (IMDUR) 30 MG 24 hr tablet, TAKE ONE TABLET BY MOUTH DAILY, Disp: 90 tablet, Rfl: 3  •  levothyroxine (SYNTHROID, LEVOTHROID) 112 MCG tablet, Take 112 mcg by mouth Daily., Disp: , Rfl:   •  magnesium oxide (MAG-OX) 400 MG tablet, Take 400 mg by mouth., Disp: , Rfl:   •  metFORMIN (GLUCOPHAGE) 500 MG tablet, Take 500 mg by mouth 2 (Two) Times a Day., Disp: , Rfl:   •  metoprolol succinate XL (TOPROL-XL) 50 MG 24 hr tablet, TAKE ONE TABLET BY MOUTH TWICE A DAY, Disp: 180 tablet, Rfl: 2  •  Omega-3 Fatty Acids (OMEGA 3 PO), Take 2,000 mg by mouth Daily., Disp: , Rfl:   •  oxybutynin XL (DITROPAN-XL) 10 MG 24 hr tablet, TAKE ONE TABLET BY MOUTH DAILY, Disp: , Rfl:   •  pravastatin (PRAVACHOL) 40 MG tablet, Take 1 tablet by mouth Daily., Disp: 90 tablet, Rfl: 3  •  Ubiquinone (ULTRA COQ10 PO), Take 1 tablet by mouth Daily., Disp: , Rfl:   •  warfarin (COUMADIN) 5 MG tablet, TAKE ONE TABLET BY MOUTH ONCE NIGHTLY OR AS DIRECTED BY THE COUMADIN CLINIC, Disp: 30 tablet, Rfl: 5  •   zolpidem (Ambien) 5 MG tablet, Take 1 tablet by mouth At Night As Needed for Sleep (To bring Pill Night of Sleep Study) for up to 1 dose., Disp: 1 tablet, Rfl: 0    PHYSICAL EXAM  Vitals:    06/10/21 1131   BP: 144/81   Pulse: 68   SpO2: 95%           06/10/21  1131   Weight: 83.3 kg (183 lb 11.2 oz)       Body mass index is 27.94 kg/m². Patient's Body mass index is 27.94 kg/m². indicating that she is overweight (BMI 25-29.9). Obesity-related health conditions include the following: obstructive sleep apnea. Obesity is unchanged. BMI is is above average; BMI management plan is completed. We discussed portion control and increasing exercise..      Gen:  No acute distress heard in voice, alert, oriented      Assessment and Plan:    1. Complex/Obstructive sleep apnea - Established, stable (1)  1. Start on BiPAP 17/8 cm H2O with breath rate of 12 with mask fitting per DME and PAP supplies   2. Continue PAP as prescribed  3. Graded exposure   4. Will check overnight oximetry once optimal control of apnea on PAP therapy   5. Return to clinic in 6 weeks with compliance report, or sooner if needed   2.   Sick sinus syndrome   3. Afib   4.   CAD      The titration results were discussed in detail with the patient.  The risks of untreated sleep apnea were reviewed.  She wants to pursue PAP therapy.  I counseled patient on PAP management, maintenance, compliance, sleep hygiene, including regular sleep wake schedule and stimulus control therapy, the importance of weight reduction, safe driving and abstaining from smoking and alcohol consumption, as well as other sedatives.       All of the patient's questions were answered. She states understanding and agreement with my assessment and plan as above.     I obtained a brief history from the patient, reviewed the medical problems and current medications, and made medical decisions regarding treatment based on that information. Total visit time 35 min, with total of 25 minutes spent  counseling patient regarding: PAP therapy, PAP compliance, PAP maintenance, Sleep hygiene, Study results and Further testing.       RTC in 2 months  She agrees to return sooner on a prn basis if sx change.           This document has been electronically signed by MICKIE Newell on Maribel 10, 2021 11:33 CDT            CC: Donis Leija MD          No ref. provider found

## 2021-06-10 NOTE — PROGRESS NOTES
Pt called to report INR from [facility]. Pt denies med changes or bleeding problems. Pt is having colonoscopy on 6/15 and will hold coumadin 3 nigths prior. Pt instructed on dosing and to rehceck INR on Weednesday June 30; pt verbalized. Patient instructed regarding medication; results given and questions answered. Nutritional counseling given.  Dietary factors affecting therapy addressed.  Patient instructed to monitor for excessive bruising or bleeding. Findings reported by Annabelle Davis RN.    Today's INR is   Lab Results - Last 18 Months   Lab Units 06/10/21  0000   INR  2.30

## 2021-06-12 ENCOUNTER — APPOINTMENT (OUTPATIENT)
Dept: LAB | Facility: HOSPITAL | Age: 81
End: 2021-06-12

## 2021-06-15 ENCOUNTER — ANESTHESIA (OUTPATIENT)
Dept: GASTROENTEROLOGY | Facility: HOSPITAL | Age: 81
End: 2021-06-15

## 2021-06-15 ENCOUNTER — HOSPITAL ENCOUNTER (OUTPATIENT)
Facility: HOSPITAL | Age: 81
Setting detail: HOSPITAL OUTPATIENT SURGERY
Discharge: HOME | End: 2021-06-15
Attending: INTERNAL MEDICINE | Admitting: INTERNAL MEDICINE

## 2021-06-15 ENCOUNTER — ANESTHESIA EVENT (OUTPATIENT)
Dept: GASTROENTEROLOGY | Facility: HOSPITAL | Age: 81
End: 2021-06-15

## 2021-06-15 VITALS
HEART RATE: 70 BPM | RESPIRATION RATE: 18 BRPM | OXYGEN SATURATION: 93 % | DIASTOLIC BLOOD PRESSURE: 68 MMHG | WEIGHT: 183 LBS | TEMPERATURE: 97.5 F | HEIGHT: 68 IN | SYSTOLIC BLOOD PRESSURE: 116 MMHG | BODY MASS INDEX: 27.74 KG/M2

## 2021-06-15 DIAGNOSIS — Z86.010 HX OF COLONIC POLYPS: ICD-10-CM

## 2021-06-15 LAB — GLUCOSE BLDC GLUCOMTR-MCNC: 123 MG/DL (ref 70–130)

## 2021-06-15 PROCEDURE — 82962 GLUCOSE BLOOD TEST: CPT

## 2021-06-15 PROCEDURE — 25010000002 PROPOFOL 10 MG/ML EMULSION: Performed by: NURSE ANESTHETIST, CERTIFIED REGISTERED

## 2021-06-15 PROCEDURE — 88305 TISSUE EXAM BY PATHOLOGIST: CPT

## 2021-06-15 RX ORDER — LIDOCAINE HYDROCHLORIDE 20 MG/ML
INJECTION, SOLUTION INTRAVENOUS AS NEEDED
Status: DISCONTINUED | OUTPATIENT
Start: 2021-06-15 | End: 2021-06-15 | Stop reason: SURG

## 2021-06-15 RX ORDER — CHOLECALCIFEROL (VITAMIN D3) 125 MCG
10 CAPSULE ORAL NIGHTLY
COMMUNITY
End: 2021-09-15

## 2021-06-15 RX ORDER — DEXTROSE AND SODIUM CHLORIDE 5; .45 G/100ML; G/100ML
30 INJECTION, SOLUTION INTRAVENOUS CONTINUOUS PRN
Status: DISCONTINUED | OUTPATIENT
Start: 2021-06-15 | End: 2021-06-15 | Stop reason: HOSPADM

## 2021-06-15 RX ORDER — PROPOFOL 10 MG/ML
VIAL (ML) INTRAVENOUS AS NEEDED
Status: DISCONTINUED | OUTPATIENT
Start: 2021-06-15 | End: 2021-06-15 | Stop reason: SURG

## 2021-06-15 RX ORDER — ONDANSETRON 2 MG/ML
4 INJECTION INTRAMUSCULAR; INTRAVENOUS ONCE AS NEEDED
Status: DISCONTINUED | OUTPATIENT
Start: 2021-06-15 | End: 2021-06-15 | Stop reason: HOSPADM

## 2021-06-15 RX ADMIN — LIDOCAINE HYDROCHLORIDE 50 MG: 20 INJECTION, SOLUTION INTRAVENOUS at 10:00

## 2021-06-15 RX ADMIN — PROPOFOL 70 MG: 10 INJECTION, EMULSION INTRAVENOUS at 10:00

## 2021-06-15 RX ADMIN — PROPOFOL 20 MG: 10 INJECTION, EMULSION INTRAVENOUS at 10:06

## 2021-06-15 RX ADMIN — DEXTROSE AND SODIUM CHLORIDE 30 ML/HR: 5; 450 INJECTION, SOLUTION INTRAVENOUS at 09:56

## 2021-06-15 NOTE — ANESTHESIA POSTPROCEDURE EVALUATION
Patient: Kirsten Rosales    Procedure Summary     Date: 06/15/21 Room / Location: [facility] ENDOSCOPY 2 / [facility] ENDOSCOPY    Anesthesia Start: 0956 Anesthesia Stop: 1014    Procedure: COLONOSCOPY (N/A ) Diagnosis:       Hx of colonic polyps      (Hx of colonic polyps [Z86.010])    Surgeons: Albino Juan DO Provider: Mary Toledo CRNA    Anesthesia Type: MAC ASA Status: 3          Anesthesia Type: MAC    Vitals  No vitals data found for the desired time range.          Post Anesthesia Care and Evaluation    Patient location during evaluation: bedside  Patient participation: complete - patient participated  Level of consciousness: awake  Pain score: 0  Pain management: adequate  Airway patency: patent  Anesthetic complications: No anesthetic complications  PONV Status: none  Cardiovascular status: acceptable  Respiratory status: acceptable  Hydration status: acceptable    Comments: ---------------------------               06/15/21                      1014         ---------------------------   BP:          107/57         Pulse:         70           Resp:          18           Temp:   97.2 °F (36.2 °C)   SpO2:          99%         ---------------------------

## 2021-06-15 NOTE — H&P
iMsael Charles DO,[facility]  Gastroenterology  Hepatology  Endoscopy  Board Certified in Internal Medicine and gastroenterology  [address], [address]  [place]. [address]  T- [phone]   F - [phone]     GASTROENTEROLOGY HISTORY AND PHYSICAL  NOTE   MISAEL CHARLES DO.         SUBJECTIVE:   6/15/2021    Name: Kirsten Rosales  DOD: 1940        Chief Complaint:       Subjective : Personal history of colon polyps    Patient is 81 y.o. female presents with desire for elective colonoscopy.      ROS/HISTORY/ CURRENT MEDICATIONS/OBJECTIVE/VS/PE:   Review of Systems:  All systems unremarkable unless specified below.  Constitutional   HENT  Eyes   Respiratory    Cardiovascular  Gastrointestinal   Endocrine  Genitourinary    Musculoskeletal   Skin  Allergic/Immunologic    Neurological    Hematological  Psychiatric/Behavioral    History:     Past Medical History:   Diagnosis Date   • Atherosclerotic heart disease of native coronary artery with unspecified angina pectoris (CMS/HCC)    • Atrial fibrillation (CMS/HCC)    • Breath shortness    • CAD (coronary artery disease)    • Hyperlipidemia    • Long term (current) use of anticoagulants    • Sick sinus syndrome (CMS/HCC)    • Unspecified hypothyroidism      Past Surgical History:   Procedure Laterality Date   • BACK SURGERY     • CARDIAC CATHETERIZATION      2/2016   • CARDIAC PACEMAKER PLACEMENT     • CARDIOVERSION     • CATARACT EXTRACTION W/ INTRAOCULAR LENS IMPLANT Right 3/16/2018    Procedure: CATARACT PHACO EXTRACTION WITH INTRAOCULAR LENS IMPLANT;  Surgeon: Umesh Thibodeaux MD;  Location: [facility];  Service: Ophthalmology   • CATARACT EXTRACTION W/ INTRAOCULAR LENS IMPLANT Left 3/23/2018    Procedure: CATARACT PHACO EXTRACTION WITH INTRAOCULAR LENS IMPLANT;  Surgeon: Umehs Thibodeaux MD;  Location: [facility];  Service: Ophthalmology   • COLONOSCOPY N/A 1/4/2018    Procedure: COLONOSCOPY;  Surgeon: Misael Charles DO;   Location: [facility];  Service:    • CORONARY ANGIOPLASTY     • PACEMAKER IMPLANTATION       History reviewed. No pertinent family history.  Social History     Tobacco Use   • Smoking status: Former Smoker   • Smokeless tobacco: Never Used   • Tobacco comment: man 30 years ago   Substance Use Topics   • Alcohol use: No   • Drug use: No     Prior to Admission medications    Medication Sig Start Date End Date Taking? Authorizing Provider   albuterol sulfate  (90 Base) MCG/ACT inhaler Inhale 2 puffs. 11/27/20 11/28/21 Yes Alejo Vincent MD   furosemide (LASIX) 20 MG tablet TAKE ONE TABLET BY MOUTH EVERY MORNING 11/9/20  Yes Alejo Vincent MD   isosorbide mononitrate (IMDUR) 30 MG 24 hr tablet TAKE ONE TABLET BY MOUTH DAILY 5/6/21  Yes Zahra Guthrie MD   levothyroxine (SYNTHROID, LEVOTHROID) 112 MCG tablet Take 112 mcg by mouth Daily.   Yes Alejo Vincent MD   magnesium oxide (MAG-OX) 400 MG tablet Take 400 mg by mouth.   Yes ProviderAlejo MD   melatonin 5 MG tablet tablet Take 10 mg by mouth Every Night.   Yes ProviderAlejo MD   metFORMIN (GLUCOPHAGE) 500 MG tablet Take 500 mg by mouth 2 (Two) Times a Day. 4/12/16  Yes Alejo Vincent MD   metoprolol succinate XL (TOPROL-XL) 50 MG 24 hr tablet TAKE ONE TABLET BY MOUTH TWICE A DAY 6/7/21  Yes Jayda Myers MD   Omega-3 Fatty Acids (OMEGA 3 PO) Take 2,000 mg by mouth Daily.   Yes Alejo Vincent MD   oxybutynin XL (DITROPAN-XL) 10 MG 24 hr tablet TAKE ONE TABLET BY MOUTH DAILY 11/30/20  Yes Alejo Vincent MD   pravastatin (PRAVACHOL) 40 MG tablet Take 1 tablet by mouth Daily. 11/4/20  Yes Jayda Myers MD   Ubiquinone (ULTRA COQ10 PO) Take 1 tablet by mouth Daily.   Yes ProviderAlejo MD   zolpidem (Ambien) 5 MG tablet Take 1 tablet by mouth At Night As Needed for Sleep (To bring Pill Night of Sleep Study) for up to 1 dose. 3/17/21  Yes Daniel Lackey MD   warfarin (COUMADIN)  5 MG tablet TAKE ONE TABLET BY MOUTH ONCE NIGHTLY OR AS DIRECTED BY THE COUMADIN CLINIC 12/9/20   Jeanne Centeno APRN     Allergies:  Aspirin, Celebrex [celecoxib], and Rythmol [propafenone]    I have reviewed the patients medical history, surgical history and family history in the available medical record system.     Current Medications:     Current Facility-Administered Medications   Medication Dose Route Frequency Provider Last Rate Last Admin   • ondansetron (ZOFRAN) injection 4 mg  4 mg Intravenous Once PRN Mary Toledo CRNA           Objective     Physical Exam:   Temp:  [97.2 °F (36.2 °C)] 97.2 °F (36.2 °C)  Heart Rate:  [83] 83  Resp:  [18] 18  BP: (136)/(72) 136/72    Physical Exam:  General Appearance:    Alert, cooperative, in no acute distress   Head:    Normocephalic, without obvious abnormality, atraumatic   Eyes:            Lids and lashes normal, conjunctivae and sclerae normal, no icterus, no pallor, corneas clear, PERRLA   Ears:    Ears appear intact with no abnormalities noted   Throat:   No oral lesions, no thrush, oral mucosa moist   Neck:   No adenopathy, supple, trachea midline, no thyromegaly, no  carotid bruit, no JVD   Back:     No kyphosis present, no scoliosis present, no skin lesions,   erythema or scars, no tenderness to percussion or                 palpation,  range of motion normal   Lungs:     Clear to auscultation,respirations regular, even and         unlabored    Heart:    Regular rhythm and normal rate, normal S1 and S2, no  murmur, no gallop, no rub, no click   Breast Exam:    Deferred   Abdomen:     Normal bowel sounds, no masses, no organomegaly, soft  nontender, nondistended, no guarding, no rebound                 tenderness   Genitalia:    Deferred   Extremities:   Moves all extremities well, no edema, no cyanosis, no          redness   Pulses:   Pulses palpable and equal bilaterally   Skin:   No bleeding, bruising or rash   Lymph nodes:   No palpable adenopathy    Neurologic:   Cranial nerves 2 - 12 grossly intact, sensation intact, DTR     present and equal bilaterally      Results Review:     Lab Results   Component Value Date    WBC 5.49 08/09/2019    WBC 6.45 07/06/2018    WBC 5.72 07/07/2017    HGB 14.2 08/09/2019    HGB 14.5 07/06/2018    HGB 14.5 07/07/2017    HCT 44.0 08/09/2019    HCT 42.8 07/06/2018    HCT 44.7 07/07/2017     08/09/2019     07/06/2018     07/07/2017             No results found for: LIPASE  Lab Results   Component Value Date    INR 2.30 06/10/2021    INR 1.90 06/03/2021    INR 1.90 05/18/2021     No results found for: CULTURE    Radiology Review:  Imaging Results (Last 72 Hours)     ** No results found for the last 72 hours. **           I reviewed the patient's new clinical results.  I reviewed the patient's new imaging results and agree with the interpretation.     ASSESSMENT/PLAN:   ASSESSMENT:  1.  Personal history of colon polyps    PLAN:  1.  Colonoscopy    Risk and benefits associated with the procedure are reviewed with the patient.  The patient wished to proceed     Albino Juan DO  06/15/21  09:48 CDT

## 2021-06-15 NOTE — ANESTHESIA PREPROCEDURE EVALUATION
Anesthesia Evaluation     Patient summary reviewed and Nursing notes reviewed   no history of anesthetic complications:  NPO Solid Status: > 8 hours  NPO Liquid Status: > 2 hours           Airway   Mallampati: II  TM distance: >3 FB  Neck ROM: limited  Possible difficult intubation  Dental    (+) lower dentures and upper dentures    Pulmonary - normal exam    breath sounds clear to auscultation  (+) COPD moderate, sleep apnea (being fitted tomorrow for mask),   Cardiovascular - normal exam  Exercise tolerance: poor (<4 METS)    PT is on anticoagulation therapy  Patient on routine beta blocker and Beta blocker given within 24 hours of surgery  Rhythm: regular  Rate: normal    (+) pacemaker pacemaker interrogated unknown, hypertension well controlled 2 medications or greater, CAD, cardiac stents more than 12 months ago dysrhythmias (SSS) Atrial Fib, hyperlipidemia,   (-) angina, murmur    ROS comment: · Left ventricular wall thickness is consistent with mild concentric hypertrophy.  · Left ventricular systolic function is normal. Estimated EF = 53%.  · Right ventricular cavity is borderline dilated.  · Left atrial cavity size is severely dilated.  · Mild mitral valve regurgitation is present  · Mild tricuspid valve regurgitation is present.  · There is a moderate (1-2cm) pericardial effusion.  · No echocardiographic evidence of tamponade    Neuro/Psych  (+) psychiatric history Anxiety,     (-) seizures, CVA  GI/Hepatic/Renal/Endo    (+)   diabetes mellitus type 2 well controlled, thyroid problem hypothyroidism    Musculoskeletal     (+) arthralgias,   Abdominal   (+) obese,    Substance History - negative use  (-) alcohol use, drug use     OB/GYN negative ob/gyn ROS         Other   arthritis,                      Anesthesia Plan    ASA 3     MAC     intravenous induction     Anesthetic plan, all risks, benefits, and alternatives have been provided, discussed and informed consent has been obtained with:  patient.    Plan discussed with CRNA.

## 2021-06-17 LAB
LAB AP CASE REPORT: NORMAL
PATH REPORT.FINAL DX SPEC: NORMAL

## 2021-06-30 ENCOUNTER — ANTICOAGULATION VISIT (OUTPATIENT)
Dept: CARDIAC SURGERY | Facility: CLINIC | Age: 81
End: 2021-06-30

## 2021-06-30 DIAGNOSIS — Z51.81 ENCOUNTER FOR THERAPEUTIC DRUG MONITORING: ICD-10-CM

## 2021-06-30 DIAGNOSIS — I48.91 ATRIAL FIBRILLATION, UNSPECIFIED TYPE (HCC): ICD-10-CM

## 2021-06-30 DIAGNOSIS — Z79.01 LONG TERM CURRENT USE OF ANTICOAGULANT THERAPY: Primary | ICD-10-CM

## 2021-06-30 LAB — INR PPP: 3.4

## 2021-06-30 NOTE — PROGRESS NOTES
Pt called to report her INR from [facility]. Pt denies med changes or bleeding problems. Pt was instructed on new dosing schedule, to have a serving of green veggies today, and retest on Friday July 2; she repeated back correctly. Patient instructed regarding medication; results given and questions answered. Nutritional counseling given.  Dietary factors affecting therapy addressed.  Patient instructed to monitor for excessive bruising or bleeding. Findings reported by Annabelle Davis RN.    Today's INR is   Lab Results - Last 18 Months   Lab Units 06/30/21  0000   INR  3.40

## 2021-07-09 ENCOUNTER — ANTICOAGULATION VISIT (OUTPATIENT)
Dept: CARDIAC SURGERY | Facility: CLINIC | Age: 81
End: 2021-07-09

## 2021-07-09 DIAGNOSIS — I48.91 ATRIAL FIBRILLATION, UNSPECIFIED TYPE (HCC): ICD-10-CM

## 2021-07-09 DIAGNOSIS — Z79.01 LONG TERM CURRENT USE OF ANTICOAGULANT THERAPY: Primary | ICD-10-CM

## 2021-07-09 DIAGNOSIS — Z51.81 ENCOUNTER FOR THERAPEUTIC DRUG MONITORING: ICD-10-CM

## 2021-07-09 LAB — INR PPP: 2.5

## 2021-07-09 NOTE — PROGRESS NOTES
Pt called to report INR from [facility]. Pt denied med changes or bleeding problems. Pt reported current dose and eating green veggies twice since last week. Pt was instructed to continue current dose and eat a serving of green veggies once weekly; py verbalized. Patient instructed regarding medication; results given and questions answered. Nutritional counseling given.  Dietary factors affecting therapy addressed.  Patient instructed to monitor for excessive bruising or bleeding. Pt instructed to recheck INR on Friday 7/16; pt repeated back correctly. Findings reported by Annabelle Davis RN.    Today's INR is   Lab Results - Last 18 Months   Lab Units 07/09/21  0000   INR  2.50

## 2021-07-19 ENCOUNTER — ANTICOAGULATION VISIT (OUTPATIENT)
Dept: CARDIAC SURGERY | Facility: CLINIC | Age: 81
End: 2021-07-19

## 2021-07-19 DIAGNOSIS — Z51.81 ENCOUNTER FOR THERAPEUTIC DRUG MONITORING: ICD-10-CM

## 2021-07-19 DIAGNOSIS — Z79.01 LONG TERM CURRENT USE OF ANTICOAGULANT THERAPY: Primary | ICD-10-CM

## 2021-07-19 DIAGNOSIS — I48.91 ATRIAL FIBRILLATION, UNSPECIFIED TYPE (HCC): ICD-10-CM

## 2021-07-19 LAB — INR PPP: 2.8

## 2021-07-19 NOTE — PROGRESS NOTES
I spoke to pt over the phone who self-tested today.  Pt denies med changes or bleeding issues.  I verified coumadin dose and instructed pt to continue the same.  Instructed pt to self-test again on Tuesday, Aug 3rd.  Pt verbalized.  Patient instructed regarding medication; results given and questions answered. Nutritional counseling given.  Dietary factors affecting therapy addressed.  Patient instructed to monitor for excessive bruising or bleeding.  Findings reported by Baron Herrera RN.   Today's INR is   Lab Results - Last 18 Months   Lab Units 07/19/21  0000   INR  2.80

## 2021-07-20 PROCEDURE — 93294 REM INTERROG EVL PM/LDLS PM: CPT | Performed by: NURSE PRACTITIONER

## 2021-07-20 PROCEDURE — 93296 REM INTERROG EVL PM/IDS: CPT | Performed by: NURSE PRACTITIONER

## 2021-08-03 ENCOUNTER — ANTICOAGULATION VISIT (OUTPATIENT)
Dept: CARDIAC SURGERY | Facility: CLINIC | Age: 81
End: 2021-08-03

## 2021-08-03 DIAGNOSIS — Z79.01 LONG TERM CURRENT USE OF ANTICOAGULANT THERAPY: Primary | ICD-10-CM

## 2021-08-03 DIAGNOSIS — I48.91 ATRIAL FIBRILLATION, UNSPECIFIED TYPE (HCC): ICD-10-CM

## 2021-08-03 DIAGNOSIS — Z51.81 ENCOUNTER FOR THERAPEUTIC DRUG MONITORING: ICD-10-CM

## 2021-08-03 LAB — INR PPP: 2.9

## 2021-08-03 NOTE — PROGRESS NOTES
Spoke to pt over the phone who self-test today. Pt denies medication changes or bleeding issues.  Verified coumadin dose and instructed pt to self-test again on Tuesday, August 24th.  Instructions verbalized.  Patient instructed regarding medication; results given and questions answered. Nutritional counseling given.  Dietary factors affecting therapy addressed.  Patient instructed to monitor for excessive bruising or bleeding.  Findings reported by Baron Herrera RN.   Today's INR is   Lab Results - Last 18 Months   Lab Units 08/03/21  0000   INR  2.90

## 2021-08-11 ENCOUNTER — OFFICE VISIT (OUTPATIENT)
Dept: SLEEP MEDICINE | Facility: HOSPITAL | Age: 81
End: 2021-08-11

## 2021-08-11 VITALS
SYSTOLIC BLOOD PRESSURE: 151 MMHG | HEART RATE: 84 BPM | BODY MASS INDEX: 27.83 KG/M2 | DIASTOLIC BLOOD PRESSURE: 92 MMHG | WEIGHT: 183.6 LBS | HEIGHT: 68 IN | OXYGEN SATURATION: 99 %

## 2021-08-11 DIAGNOSIS — G47.31 COMPLEX SLEEP APNEA SYNDROME: ICD-10-CM

## 2021-08-11 DIAGNOSIS — G47.33 OBSTRUCTIVE SLEEP APNEA, ADULT: Primary | ICD-10-CM

## 2021-08-11 PROCEDURE — 99214 OFFICE O/P EST MOD 30 MIN: CPT | Performed by: NURSE PRACTITIONER

## 2021-08-11 NOTE — PROGRESS NOTES
Sleep Clinic Follow Up    Date: 2021  Primary Care Provider: Donis Leija MD    Last office visit: 06/10/2021 (I reviewed this note)    CC: Follow up: JOSEPH started on BiPAP S/T      Interim History:  Since the last visit:    1) severe JOSEPH -  Kirsten Rosales has mostly compliant with BiPAP. She denies machine issues or mornings headaches, . She denies abnormal dreams, sleep paralysis, nasal congestion, URI sx.  She was not able to tolerate pressure of 17/8 cm H2O. Currently at 9/4 cm H2O and plan is to slowly titrate up as she can tolerate. She still struggles with pressure intolerance but thinks it is more due to breath rate of 12. She feels like she is breathing against the machine and feels like she is smothering.   Reports occasional dry mouth.  She is also having mask issues. She has been to DME several times to try different mask. Currently wearing nasal pillows.   Overall she cannot tell a difference in how she feels since starting on CPAP. She wants to continue to try to wear it.     2) Patient denies RLS symptoms.       Sleep Testin. Psg on 3032, AHI of 30   2. CPAP titration on 2021, recommended 17/8 cm H2O with breath rate of 12  3. Currently on 9/4 cm H2O with breath rate of 12    PAP Data:    Time frame: 2021-08/10/2021   Compliance: 78 %  Average use on days used: 3hrs 56 min  Percent of days with usage greater than or equal to 4 hours: 32%  PAP range: 9/4 cm H2O with breath rate 12  Leak: 39.9 L/ minutes  Average AHI: 12.6 events/hr- detailed report does not give central apnea/clear airway index   Mask type: Nasal pillows  DME: [facility]     Bed time: 2330  Sleep latency:  minutes  Number of times awakens during the night: 2-3  Wake time: 4900-6956  Estimated total sleep time at night: 5-6 hours  Caffeine intake: 0-1 cups of coffee, 1-2 cups of tea, and 0 sodas per day  Alcohol intake: 0 drinks per week  Nap time: denies    Sleepiness with Driving: denies       [place] - 3        PMHx, FH, SH reviewed and pertinent changes are:  unchanged from last office visit on 06/10/2021      REVIEW OF SYSTEMS:   Positive: SOA with exertion   Negative for chest pain, fever, chills, cough, N/V/D, abdominal pain.    Smoking:none         Exam:  Vitals:    08/11/21 1637   Pulse: 84   SpO2: 99%           08/11/21  1637   Weight: 83.3 kg (183 lb 9.6 oz)     Body mass index is 27.92 kg/m². Patient's Body mass index is 27.92 kg/m². indicating that she is overweight (BMI 25-29.9). Obesity-related health conditions include the following: obstructive sleep apnea. Obesity is unchanged. BMI is is above average; BMI management plan is completed. We discussed portion control and increasing exercise..      Gen:                No distress, conversant, pleasant, appears stated age, alert, oriented  Eyes:               Anicteric sclera, moist conjunctiva, no lid lag                           EOMI   Lungs:             normal effort, non-labored breathing                          Clear to auscultation bilaterally          CV:                  irregular rhythm, no murmur                          no lower extremity edema               Psych:             Appropriate affect  Neuro:             CN 2-12 appear intact    Past Medical History:   Diagnosis Date   • Atherosclerotic heart disease of native coronary artery with unspecified angina pectoris (CMS/HCC)    • Atrial fibrillation (CMS/HCC)    • Breath shortness    • CAD (coronary artery disease)    • Hyperlipidemia    • Long term (current) use of anticoagulants    • Sick sinus syndrome (CMS/HCC)    • Unspecified hypothyroidism        Current Outpatient Medications:   •  albuterol sulfate  (90 Base) MCG/ACT inhaler, Inhale 2 puffs., Disp: , Rfl:   •  furosemide (LASIX) 20 MG tablet, TAKE ONE TABLET BY MOUTH EVERY MORNING, Disp: , Rfl:   •  isosorbide mononitrate (IMDUR) 30 MG 24 hr tablet, TAKE ONE TABLET BY MOUTH DAILY, Disp: 90 tablet, Rfl: 3  •   levothyroxine (SYNTHROID, LEVOTHROID) 112 MCG tablet, Take 112 mcg by mouth Daily., Disp: , Rfl:   •  magnesium oxide (MAG-OX) 400 MG tablet, Take 400 mg by mouth., Disp: , Rfl:   •  melatonin 5 MG tablet tablet, Take 10 mg by mouth Every Night., Disp: , Rfl:   •  metFORMIN (GLUCOPHAGE) 500 MG tablet, Take 500 mg by mouth 2 (Two) Times a Day., Disp: , Rfl:   •  metoprolol succinate XL (TOPROL-XL) 50 MG 24 hr tablet, TAKE ONE TABLET BY MOUTH TWICE A DAY, Disp: 180 tablet, Rfl: 2  •  Omega-3 Fatty Acids (OMEGA 3 PO), Take 2,000 mg by mouth Daily., Disp: , Rfl:   •  oxybutynin XL (DITROPAN-XL) 10 MG 24 hr tablet, TAKE ONE TABLET BY MOUTH DAILY, Disp: , Rfl:   •  pravastatin (PRAVACHOL) 40 MG tablet, Take 1 tablet by mouth Daily., Disp: 90 tablet, Rfl: 3  •  Ubiquinone (ULTRA COQ10 PO), Take 1 tablet by mouth Daily., Disp: , Rfl:   •  warfarin (COUMADIN) 5 MG tablet, TAKE ONE TABLET BY MOUTH ONCE NIGHTLY OR AS DIRECTED BY THE COUMADIN CLINIC, Disp: 30 tablet, Rfl: 5  •  zolpidem (Ambien) 5 MG tablet, Take 1 tablet by mouth At Night As Needed for Sleep (To bring Pill Night of Sleep Study) for up to 1 dose., Disp: 1 tablet, Rfl: 0      Assessment and Plan:    1. Obstructive sleep apnea/Complex  -Established, stable (1)  1. Mostly compliant with PAP therapy- discussed health effects of uncontrolled sleep apnea   2. Continue PAP as prescribed- I reviewed titration study again. I will increase pressure to 10/6 cm h2O. Breath rate turned off. Will check compliance report in 1 week. If she is able to tolerate better and AHI improved will slowly increase IPAP and EPAP. If unable to tolerate will consider switching to ASV. However detailed [facility] report does not indicate central apnea index.   3. Script for PAP supplies  4. Biotene oral rinse for dry mouth   5. Drowsy driving tips- do not drive if feeling sleepy   6. Return to clinic in 1 month with compliance report unless change in symptoms in interim period          I spent  39 minutes caring for Kirsten on this date of service. This time includes time spent by me in the following activities: preparing for the visit, obtaining and/or reviewing a separately obtained history, performing a medically appropriate examination and/or evaluation, counseling and educating the patient/family/caregiver, ordering medications, tests, or procedures and documenting information in the medical record.           This document has been electronically signed by MICKIE Newell on August 11, 2021 16:37 CDT            CC: Donis Leija MD          No ref. provider found

## 2021-08-13 DIAGNOSIS — G47.33 OSA (OBSTRUCTIVE SLEEP APNEA): Primary | ICD-10-CM

## 2021-08-13 DIAGNOSIS — G47.31 COMPLEX SLEEP APNEA SYNDROME: ICD-10-CM

## 2021-08-13 NOTE — PROGRESS NOTES
PAP Data:    Time frame: 08/12/2021-08/13/2021   Compliance 100 %  Average use on days used: 5hrs 57 min  Percent of days with usage greater than or equal to 4 hours: 50%  PAP range : 10/6 cm H2O  Average 90% pressure: 10/6 cmH2O  Leak: 57.6 minutes  Average AHI 36.4 events/hr- apnea events not able to tell central vs obstructive   Mask type: Nasal pillows  DME: [facility]     I called and spoke with patient on the phone. Increase pressure to 12/7 cm H2O with breath rate of 6.  She is going to DME today to get chin strap. She needs chin strap added to nasal pillows to reduce leak .She sleeps with her mouth open. Not able to tolerate FFM due to claustrophobia. Verbalized understanding. Will check AHI next week.

## 2021-08-24 ENCOUNTER — ANTICOAGULATION VISIT (OUTPATIENT)
Dept: CARDIAC SURGERY | Facility: CLINIC | Age: 81
End: 2021-08-24

## 2021-08-24 DIAGNOSIS — I48.91 ATRIAL FIBRILLATION, UNSPECIFIED TYPE (HCC): ICD-10-CM

## 2021-08-24 DIAGNOSIS — Z51.81 ENCOUNTER FOR THERAPEUTIC DRUG MONITORING: ICD-10-CM

## 2021-08-24 DIAGNOSIS — Z79.01 LONG TERM CURRENT USE OF ANTICOAGULANT THERAPY: Primary | ICD-10-CM

## 2021-08-24 LAB — INR PPP: 2.9

## 2021-08-24 NOTE — PROGRESS NOTES
Pt called to report INR from [facility] home meter. Pt denied med changes or bleeding problems. Pt was instructed to continue current dose and eat a salad today; pt verbalized. Patient instructed regarding medication; results given and questions answered. Nutritional counseling given.  Dietary factors affecting therapy addressed.  Patient instructed to monitor for excessive bruising or bleeding. Pt instructed to recheck INR on Tuesday September 21; pt repeated back correctly. Findings reported by Annabelle Davis RN.    Today's INR is   Lab Results - Last 18 Months   Lab Units 08/24/21  0000   INR  2.90

## 2021-09-15 ENCOUNTER — OFFICE VISIT (OUTPATIENT)
Dept: SLEEP MEDICINE | Facility: HOSPITAL | Age: 81
End: 2021-09-15

## 2021-09-15 VITALS
OXYGEN SATURATION: 96 % | HEIGHT: 68 IN | WEIGHT: 183.2 LBS | HEART RATE: 79 BPM | BODY MASS INDEX: 27.77 KG/M2 | SYSTOLIC BLOOD PRESSURE: 140 MMHG | DIASTOLIC BLOOD PRESSURE: 68 MMHG

## 2021-09-15 DIAGNOSIS — G47.33 OBSTRUCTIVE SLEEP APNEA, ADULT: ICD-10-CM

## 2021-09-15 DIAGNOSIS — G47.33 OSA (OBSTRUCTIVE SLEEP APNEA): Primary | ICD-10-CM

## 2021-09-15 DIAGNOSIS — F51.04 PSYCHOPHYSIOLOGICAL INSOMNIA: ICD-10-CM

## 2021-09-15 PROCEDURE — 99213 OFFICE O/P EST LOW 20 MIN: CPT | Performed by: NURSE PRACTITIONER

## 2021-09-15 RX ORDER — RAMELTEON 8 MG/1
8 TABLET ORAL NIGHTLY
Qty: 30 TABLET | Refills: 0 | Status: SHIPPED | OUTPATIENT
Start: 2021-09-15 | End: 2022-07-20

## 2021-09-15 NOTE — PROGRESS NOTES
Sleep Clinic Follow Up    Date: 9/15/2021  Primary Care Provider: Donis Leija MD    Last office visit: 2021 (I reviewed this note)    CC: Follow up: JOSEPH on BiPAP S/T      Interim History:  Since the last visit:    1) severe JOSEPH -  Kirsten Rosales has mostly remained compliant with BiPAP. She denies machine issues,  headaches. She denies abnormal dreams, sleep paralysis, nasal congestion, URI sx.  Reports occasional dry mouth.     Since her last visit:  Able to tolerate pressure better than at the beginning. Still has nights where she feels like she is smothering. Feels like she is only getting 5-6 hours of sleep per night. She struggles to fall asleep. States some nights she is still laying in bed awake at 0300. She has taken OTC sleep aid but not helping. She has taken Restoril in the past but has been years since she was on it.   She does not want to wear FFM because too much headgear. She has nasal mask and chin strap but has only tried chin strap a handful of nights.     2) Patient denies RLS symptoms.       Sleep Testin. Psg on 3032, AHI of 30   2. CPAP titration on 2021, recommended 17/8 cm H2O with breath rate of 12  3. Currently on 12/7 cm H2O with breath rate of 6    PAP Data:    Time frame: 2021-09/15/2021   Compliance: 97 %  Average use on days used: 3hrs 34 min  Percent of days with usage greater than or equal to 4 hours: 33%  PAP range: 12/7 cm H2O breath rate 6  Leak: 49.4 L/ minutes  Average AHI: 23.7 events/hr- download does not show clear airway vs obstructive events   Mask type: Nasal mask  DME: [facility]     Bed time: 2330  Sleep latency:  minutes  Number of times awakens during the night: 2-3  Wake time: 5176-4375  Estimated total sleep time at night: 5-6 hours  Caffeine intake: 0-1 cups of coffee, 1-2 cups of tea, and 0 sodas per day  Alcohol intake: 0 drinks per week  Nap time: denies    Sleepiness with Driving: denies      [place] - 3        PMHx, FH,  SH reviewed and pertinent changes are:  unchanged from last office visit on 08/11/2021      REVIEW OF SYSTEMS:   Positive: SOA with exertion   Negative for chest pain,  fever, chills, cough, N/V/D, abdominal pain.    Smoking:none         Exam:  Vitals:    09/15/21 1340   BP: 140/68   Pulse: 79   SpO2: 96%           09/15/21  1340   Weight: 83.1 kg (183 lb 3.2 oz)     Body mass index is 27.86 kg/m². Patient's Body mass index is 27.86 kg/m². indicating that she is overweight (BMI 25-29.9). Obesity-related health conditions include the following: obstructive sleep apnea. Obesity is unchanged. BMI is is above average; BMI management plan is completed. We discussed portion control and increasing exercise..      Gen:                No distress, conversant, pleasant, appears stated age, alert, oriented  Eyes:               Anicteric sclera, moist conjunctiva, no lid lag                           EOMI   Lungs:             normal effort, non-labored breathing                             CV:                  no lower extremity edema                Psych:             Appropriate affect  Neuro:             CN 2-12 appear intact    Past Medical History:   Diagnosis Date   • Atherosclerotic heart disease of native coronary artery with unspecified angina pectoris (CMS/HCC)    • Atrial fibrillation (CMS/HCC)    • Breath shortness    • CAD (coronary artery disease)    • Hyperlipidemia    • Long term (current) use of anticoagulants    • Sick sinus syndrome (CMS/HCC)    • Unspecified hypothyroidism        Current Outpatient Medications:   •  albuterol sulfate  (90 Base) MCG/ACT inhaler, Inhale 2 puffs., Disp: , Rfl:   •  furosemide (LASIX) 20 MG tablet, TAKE ONE TABLET BY MOUTH EVERY MORNING, Disp: , Rfl:   •  isosorbide mononitrate (IMDUR) 30 MG 24 hr tablet, TAKE ONE TABLET BY MOUTH DAILY, Disp: 90 tablet, Rfl: 3  •  levothyroxine (SYNTHROID, LEVOTHROID) 112 MCG tablet, Take 112 mcg by mouth Daily., Disp: , Rfl:   •   magnesium oxide (MAG-OX) 400 MG tablet, Take 400 mg by mouth., Disp: , Rfl:   •  melatonin 5 MG tablet tablet, Take 10 mg by mouth Every Night., Disp: , Rfl:   •  metFORMIN (GLUCOPHAGE) 500 MG tablet, Take 500 mg by mouth 2 (Two) Times a Day., Disp: , Rfl:   •  metoprolol succinate XL (TOPROL-XL) 50 MG 24 hr tablet, TAKE ONE TABLET BY MOUTH TWICE A DAY, Disp: 180 tablet, Rfl: 2  •  Omega-3 Fatty Acids (OMEGA 3 PO), Take 2,000 mg by mouth Daily., Disp: , Rfl:   •  oxybutynin XL (DITROPAN-XL) 10 MG 24 hr tablet, TAKE ONE TABLET BY MOUTH DAILY, Disp: , Rfl:   •  pravastatin (PRAVACHOL) 40 MG tablet, Take 1 tablet by mouth Daily., Disp: 90 tablet, Rfl: 3  •  Ubiquinone (ULTRA COQ10 PO), Take 1 tablet by mouth Daily., Disp: , Rfl:   •  warfarin (COUMADIN) 5 MG tablet, TAKE ONE TABLET BY MOUTH ONCE NIGHTLY OR AS DIRECTED BY THE COUMADIN CLINIC, Disp: 30 tablet, Rfl: 5      Assessment and Plan:    1. Obstructive sleep apnea   1. Mostly compliant with PAP therapy- increase hours used per night   2. Continue PAP as prescribed, increase pressure to 15/8 cm H2O with breath rate 10   3. Script for PAP supplies  4. Consider ASV - download does not show clear airway events - will reach out to DME  5. Wear nasal mask with chin strap every night- discussed leak contributing to elevated AHI and importance of reducing leak   6. Drowsy driving tips- do not drive if feeling sleepy   7. Return to clinic in 1 month with compliance report unless change in symptoms in interim period  3. Insomnia - sleep onset and or maintenance - Established, worsening (2)   1. Start ramelteon 8mg nightly. Educated on dosage, usage and possible side effects   2. Good sleep hygiene             I spent 25minutes caring for Kirsten on this date of service. This time includes time spent by me in the following activities: preparing for the visit, obtaining and/or reviewing a separately obtained history, performing a medically appropriate examination and/or  evaluation, counseling and educating the patient/family/caregiver, ordering medications, tests, or procedures and documenting information in the medical record. All of her questions were answered and she verbalized understanding. Patient has large leak. Correcting leak will decrease AHI. She needs chin strap with nasal mask or to wear FFM. She states she will wear chin strap every night. I will call her in one week to check on her and check compliance report.           This document has been electronically signed by MICKIE Newell on September 15, 2021 13:43 CDT            CC: Donis Leija MD          No ref. provider found

## 2021-09-21 ENCOUNTER — ANTICOAGULATION VISIT (OUTPATIENT)
Dept: CARDIAC SURGERY | Facility: CLINIC | Age: 81
End: 2021-09-21

## 2021-09-21 DIAGNOSIS — Z51.81 ENCOUNTER FOR THERAPEUTIC DRUG MONITORING: ICD-10-CM

## 2021-09-21 DIAGNOSIS — Z79.01 LONG TERM CURRENT USE OF ANTICOAGULANT THERAPY: Primary | ICD-10-CM

## 2021-09-21 DIAGNOSIS — I48.91 ATRIAL FIBRILLATION, UNSPECIFIED TYPE (HCC): ICD-10-CM

## 2021-09-21 LAB — INR PPP: 2.1

## 2021-09-21 NOTE — PROGRESS NOTES
Pt called to report INR from [facility] home meter. Pt denied med changes or bleeding problems. Pt confirmed current dose. Pt was instructed to continue current dose and appt made in office on 10/13 when pt has pacer check for her yearly appt; pt verbalized. Patient instructed regarding medication; results given and questions answered. Nutritional counseling given.  Dietary factors affecting therapy addressed.  Patient instructed to monitor for excessive bruising or bleeding. Findings reported by Annabelle Davis RN.    Today's INR is   Lab Results - Last 18 Months   Lab Units 09/21/21  0000   INR  2.10

## 2021-09-23 ENCOUNTER — DOCUMENTATION (OUTPATIENT)
Dept: CARDIAC SURGERY | Facility: CLINIC | Age: 81
End: 2021-09-23

## 2021-09-23 DIAGNOSIS — I48.91 ATRIAL FIBRILLATION, UNSPECIFIED TYPE (HCC): ICD-10-CM

## 2021-09-23 RX ORDER — WARFARIN SODIUM 5 MG/1
TABLET ORAL
Qty: 30 TABLET | Refills: 5 | Status: SHIPPED | OUTPATIENT
Start: 2021-09-23 | End: 2022-06-20 | Stop reason: SDUPTHER

## 2021-10-11 DIAGNOSIS — G47.31 COMPLEX SLEEP APNEA SYNDROME: Primary | ICD-10-CM

## 2021-10-11 NOTE — PROGRESS NOTES
PAP Data:    Time frame: 09/12/2021-10/11/2021   Compliance 97 %  Average use on days used: 3hrs 15 min  Percent of days with usage greater than or equal to 4 hours: 27%  PAP range : 15/8 cm H2O, breath rate 10  Leak: 50.8 L/ minutes  Average AHI 21.9 events/hr      Increase pressure to 18/10 cm h2O with breath rate 10. If AHI still elevated at follow-up next week, will recommend BiPAP/ASV titration study.

## 2021-10-13 ENCOUNTER — CLINICAL SUPPORT (OUTPATIENT)
Dept: CARDIOLOGY | Facility: CLINIC | Age: 81
End: 2021-10-13

## 2021-10-13 ENCOUNTER — ANTICOAGULATION VISIT (OUTPATIENT)
Dept: CARDIAC SURGERY | Facility: CLINIC | Age: 81
End: 2021-10-13

## 2021-10-13 DIAGNOSIS — I49.5 SSS (SICK SINUS SYNDROME) (HCC): Primary | ICD-10-CM

## 2021-10-13 DIAGNOSIS — Z51.81 ENCOUNTER FOR THERAPEUTIC DRUG MONITORING: ICD-10-CM

## 2021-10-13 DIAGNOSIS — Z95.0 PACEMAKER: ICD-10-CM

## 2021-10-13 DIAGNOSIS — Z79.01 LONG TERM CURRENT USE OF ANTICOAGULANT THERAPY: Primary | ICD-10-CM

## 2021-10-13 LAB — INR PPP: 2.7 (ref 0.9–1.1)

## 2021-10-13 PROCEDURE — 36416 COLLJ CAPILLARY BLOOD SPEC: CPT | Performed by: NURSE PRACTITIONER

## 2021-10-13 PROCEDURE — 93793 ANTICOAG MGMT PT WARFARIN: CPT | Performed by: NURSE PRACTITIONER

## 2021-10-13 PROCEDURE — 85610 PROTHROMBIN TIME: CPT | Performed by: NURSE PRACTITIONER

## 2021-10-13 PROCEDURE — 93288 INTERROG EVL PM/LDLS PM IP: CPT | Performed by: NURSE PRACTITIONER

## 2021-10-13 NOTE — PROGRESS NOTES
Pt here today for yearly check of home meter with our [place] meter. Pt denied med changes or bleeding problems. Pt verified current dose. Pt meter read 2.4. Pt instructed to continue current dose and recheck INR Wednesday 11/10; pt repeated back correctly. Patient instructed regarding medication; results given and questions answered. Nutritional counseling given.  Dietary factors affecting therapy addressed.  Patient instructed to monitor for excessive bruising or bleeding. Findings reported by Annabelle Davis RN.    Today's INR is Lab Results - Last 18 Months   Lab Units 10/13/21  0000   INR  2.70*

## 2021-10-13 NOTE — PROGRESS NOTES
Pacemaker Evaluation Report    October 13, 2021    Primary Cardiologist: Dr. Myers  Implanting MD: Dr. Elam  :[employer] Model: [employer]ent DF [id number] Serial Number: [id number]  Implant date: 2/12/2014    Reason for evaluation:routine  PPM office  Cardiac device indication(s): sick sinus syndrome    Battery  REINA: 2-2.8 years    Interrogation Results  Atrial sensing: P wave: 1.7 mV  Atrial capture: A fib  Atrial lead impedance: 300 ohms  Ventricular sensing: R wave: 6.3 mV  Ventricular capture: 0.75V @  0.5 ms  Ventricular lead impedance: right  600 ohms    Parameters  Mode: DDDR  Base Rate: 65/120    Diagnostic Data  Atrial paced: <1%   Ventricular paced: 49 %  Mode switch: 100%  AT/AF [place]: 100%  AHR: 0  VHR: 4     Intrinsic rate: 80 in VVI    Changes made: None    Conclusions: normal device function. Follow up 1 year. Remotes.     Assessment:  1. SSS (sick sinus syndrome) (HCC)    2. Pacemaker    - coumadin                  This document has been electronically signed by MICKIE Hogan on October 13, 2021 12:57 CDT

## 2021-10-18 ENCOUNTER — OFFICE VISIT (OUTPATIENT)
Dept: CARDIOLOGY | Facility: CLINIC | Age: 81
End: 2021-10-18

## 2021-10-18 ENCOUNTER — OFFICE VISIT (OUTPATIENT)
Dept: SLEEP MEDICINE | Facility: HOSPITAL | Age: 81
End: 2021-10-18

## 2021-10-18 VITALS
BODY MASS INDEX: 28.49 KG/M2 | DIASTOLIC BLOOD PRESSURE: 94 MMHG | SYSTOLIC BLOOD PRESSURE: 173 MMHG | WEIGHT: 188 LBS | HEIGHT: 68 IN | OXYGEN SATURATION: 90 % | HEART RATE: 84 BPM

## 2021-10-18 VITALS
SYSTOLIC BLOOD PRESSURE: 138 MMHG | HEART RATE: 72 BPM | BODY MASS INDEX: 28.55 KG/M2 | HEIGHT: 68 IN | TEMPERATURE: 97.1 F | OXYGEN SATURATION: 97 % | WEIGHT: 188.4 LBS | DIASTOLIC BLOOD PRESSURE: 80 MMHG

## 2021-10-18 DIAGNOSIS — G47.31 COMPLEX SLEEP APNEA SYNDROME: Primary | ICD-10-CM

## 2021-10-18 DIAGNOSIS — F51.04 PSYCHOPHYSIOLOGICAL INSOMNIA: ICD-10-CM

## 2021-10-18 DIAGNOSIS — I48.92 ATRIAL FLUTTER, UNSPECIFIED TYPE (HCC): Primary | ICD-10-CM

## 2021-10-18 PROCEDURE — 99214 OFFICE O/P EST MOD 30 MIN: CPT | Performed by: INTERNAL MEDICINE

## 2021-10-18 PROCEDURE — 93000 ELECTROCARDIOGRAM COMPLETE: CPT | Performed by: INTERNAL MEDICINE

## 2021-10-18 PROCEDURE — 99213 OFFICE O/P EST LOW 20 MIN: CPT | Performed by: NURSE PRACTITIONER

## 2021-10-18 RX ORDER — TRAZODONE HYDROCHLORIDE 50 MG/1
50 TABLET ORAL NIGHTLY
Qty: 30 TABLET | Refills: 0 | Status: SHIPPED | OUTPATIENT
Start: 2021-10-18 | End: 2021-11-29 | Stop reason: DRUGHIGH

## 2021-10-18 NOTE — PROGRESS NOTES
[facility]  OFFICE NOTE    Cardiovascular Medicine  Jayda Myers M.D., RPVI         No referring provider defined for this encounter.    Thank you for asking me to see Kirsten Rosales for afib.    History of Present Illness  This is a 81 y.o. female with:    1. Coronary artery disease involving native coronary artery of native heart with angina pectoris (CMS/HCC)    2. Mixed hyperlipidemia    3. Essential hypertension    4. Permanent atrial fibrillation (CMS/HCC)    5. Type 2 diabetes mellitus without complication, without long-term current use of insulin (CMS/[facility])          Chief complaint -Follow-up CAD        History of present Illness- 81 y.o.-year-old lady with history of coronary disease prior stent placement in 2005, has chronic atrial fibrillation with sick sinus syndrome on warfarin for anticoagulation.  Last cardiac cath was in 2016 which showed moderate disease in the RCA and left circumflex and patent stent in the ramus. She is doing well no chest pain or shortness of breath. she has her pacemaker checked regularly through the pacemaker clinic.  She denies any GI symptoms or CNS symptoms.     No acute issues since last visit Dr. Silver.  She occasionally has some fullness in her chest when she lays down at night however when she gets up and take a deep breath it resolves pain orthopnea or PND though.  She has been using 2 pillows.  No swelling  She has stable shortness of breath on exertion.  No chest pain.  Has been taking Coumadin without any bleeding problems.  Denying any palpitations.    11/16/2020:  Patient reported over last several months has been having worsening orthopnea.  She was hemogram her care physician BNP was mildly elevated.  Denying any chest pain or palpitations.  She did have a fall in September.    02/03/2021:  No acute issues since last visit continues to have intermittent orthopnea.  She is taking Lasix with improvement in her symptoms.   Echocardiogram is normal LV systolic function.  No bleeding problems from Coumadin.    10/18/2021:  She was found to have severe sleep apnea and has been started on BiPAP with some improvement in her symptoms.    Review of Systems - ROS  Constitution: Negative for weakness, weight gain and weight loss.   HENT: Negative for congestion.    Eyes: Negative for blurred vision.   Cardiovascular: As mentioned above  Respiratory: Negative for cough and hemoptysis.    Endocrine: Negative for polydipsia and polyuria.   Hematologic/Lymphatic: Negative for bleeding problem. Does not bruise/bleed easily.   Skin: Negative for flushing.   Musculoskeletal: Negative for neck pain and stiffness.   Gastrointestinal: Negative for abdominal pain, diarrhea, jaundice, melena, nausea and vomiting.   Genitourinary: Negative for dysuria and hematuria.   Neurological: Negative for dizziness, focal weakness and numbness.   Psychiatric/Behavioral: Negative for altered mental status and depression.          All other systems were reviewed and were negative.    family history is not on file.     reports that she has quit smoking. She has never used smokeless tobacco. She reports that she does not drink alcohol and does not use drugs.    Allergies   Allergen Reactions   • Aspirin Hives   • Celebrex [Celecoxib] Hives   • Rythmol [Propafenone] Nausea And Vomiting         Current Outpatient Medications:   •  albuterol sulfate  (90 Base) MCG/ACT inhaler, Inhale 2 puffs., Disp: , Rfl:   •  furosemide (LASIX) 20 MG tablet, TAKE ONE TABLET BY MOUTH EVERY MORNING, Disp: , Rfl:   •  isosorbide mononitrate (IMDUR) 30 MG 24 hr tablet, TAKE ONE TABLET BY MOUTH DAILY, Disp: 90 tablet, Rfl: 3  •  levothyroxine (SYNTHROID, LEVOTHROID) 112 MCG tablet, Take 112 mcg by mouth Daily., Disp: , Rfl:   •  magnesium oxide (MAG-OX) 400 MG tablet, Take 400 mg by mouth., Disp: , Rfl:   •  metFORMIN (GLUCOPHAGE) 500 MG tablet, Take 500 mg by mouth 2 (Two) Times a  "Day., Disp: , Rfl:   •  metoprolol succinate XL (TOPROL-XL) 50 MG 24 hr tablet, TAKE ONE TABLET BY MOUTH TWICE A DAY, Disp: 180 tablet, Rfl: 2  •  Omega-3 Fatty Acids (OMEGA 3 PO), Take 2,000 mg by mouth Daily., Disp: , Rfl:   •  oxybutynin XL (DITROPAN-XL) 10 MG 24 hr tablet, TAKE ONE TABLET BY MOUTH DAILY, Disp: , Rfl:   •  pravastatin (PRAVACHOL) 40 MG tablet, Take 1 tablet by mouth Daily., Disp: 90 tablet, Rfl: 3  •  ramelteon (Rozerem) 8 MG tablet, Take 1 tablet by mouth Every Night., Disp: 30 tablet, Rfl: 0  •  Ubiquinone (ULTRA COQ10 PO), Take 1 tablet by mouth Daily., Disp: , Rfl:   •  warfarin (COUMADIN) 5 MG tablet, TAKE ONE TABLET BY MOUTH ONCE NIGHTLY OR AS DIRECTED BY THE COUMADIN CLINIC, Disp: 30 tablet, Rfl: 5    Physical Exam:  Vitals:    10/18/21 1036   BP: 138/80   BP Location: Left arm   Patient Position: Sitting   Cuff Size: Adult   Pulse: 72   Temp: 97.1 °F (36.2 °C)   SpO2: 97%   Weight: 85.5 kg (188 lb 6.4 oz)   Height: 172.7 cm (68\")   PainSc: 0-No pain     Current Pain Level: none  Pulse Ox: Normal  on room air  General: alert, appears stated age and cooperative     Body Habitus: well-nourished    HEENT: Head: Normocephalic, no lesions, without obvious abnormality. No arcus senilis, xanthelasma or xanthomas.    Neuro: alert, oriented x3  Pulses: 2+ and symmetric  JVP: Volume/Pulsation: Normal.  Normal waveforms.   Appropriate inspiratory decrease.  No Kussmaul's. No Jaylin's.   Carotid Exam: no bruit normal pulsation bilaterally   Carotid Volume: normal.     Respirations: no increased work of breathing   Chest:  Normal    Pulmonary:Normal   Precordium: Normal impulses. P2 is not palpable.  RV Heave: absent  LV Heave: absent  [place]:  normal size and placement  Palpable S4: absent.  Heart rate: normal    Heart Rhythm: irregular     Heart Sounds: S1: normal  S2: normal  S3: absent   S4: absent  Opening Snap: absent    Pericardial Rub:  Absent: .    Abdomen:   Appearance: normal .  Palpation: " Soft, non-tender to palpation, bowel sounds positive in all four quadrants; no guarding or rebound tenderness  Extremity: no edema.   LE Skin: no rashes  LE Hair:  normal  LE Pulses: well perfused with normal pulses in the distal extremities  Pallor on elevation: Absent. Rubor on dependency: None      DATA REVIEWED:     EKG. I personally reviewed and interpreted the EKG.  Atrial flutter with variable AV block and intermittent v paced rhythm    ECG/EMG Results (all)     None        ---------------------------------------------------  TTE/DEENA:  Results for orders placed in visit on 11/16/20    Adult Transthoracic Echo Complete W/ Cont if Necessary Per Protocol    Interpretation Summary  · Left ventricular ejection fraction appears to be 56 - 60%. Left ventricular systolic function is normal.  · Estimated right ventricular systolic pressure from tricuspid regurgitation is normal (<35 mmHg).  · Left atrial volume is moderately increased.  · Left ventricular diastolic function was indeterminate.  · Mild mitral annular calcification is present. Mild mitral valve regurgitation is present with a posteriorly-directed jet noted. No significant mitral valve stenosis is present.      --------------------------------------------------------------------------------------------------  LABS:     The CVD Risk score (D'Agostino, et al., 2008) failed to calculate for the following reasons:    The 2008 CVD risk score is only valid for ages 30 to 74         Lab Results   Component Value Date    GLUCOSE 100 (H) 08/09/2019    BUN 20 06/08/2021    CREATININE 0.7 06/08/2021    EGFRIFNONA 70 08/09/2019    EGFRIFAFRI 97 06/08/2021    BCR 26.6 (H) 08/09/2019    K 4.2 06/08/2021    CO2 28 06/08/2021    CALCIUM 9.3 06/08/2021    ALBUMIN 4.2 06/08/2021    AST 53 (H) 06/08/2021    ALT 17 06/08/2021     Lab Results   Component Value Date    WBC 5.49 08/09/2019    HGB 14.2 08/09/2019    HCT 44.0 08/09/2019    MCV 89.6 08/09/2019      08/09/2019     Lab Results   Component Value Date    CHOL 165 06/13/2020    CHLPL 179 06/08/2021    TRIG 207 (H) 06/08/2021    HDL 41 06/08/2021    LDL 97 06/08/2021     Lab Results   Component Value Date    TSH 1.32 06/08/2021     Lab Results   Component Value Date    CKTOTAL 51 02/10/2014    CKMB 1.0 02/10/2014    TROPONINI <0.012 02/10/2014     Lab Results   Component Value Date    HGBA1C 6.7 (H) 06/08/2021     No results found for: DDIMER  Lab Results   Component Value Date    ALT 17 06/08/2021     Lab Results   Component Value Date    HGBA1C 6.7 (H) 06/08/2021    HGBA1C 6.5 (H) 09/02/2020    HGBA1C 6.7 (H) 06/13/2020     Lab Results   Component Value Date    CREATININE 0.7 06/08/2021     No results found for: IRON, TIBC, FERRITIN  Lab Results   Component Value Date    INR 2.70 (A) 10/13/2021    INR 2.10 09/21/2021    INR 2.90 08/24/2021    PROTIME 14.0 02/18/2016    PROTIME 15.2 02/14/2014    PROTIME 16.6 (H) 02/13/2014       Assessment/Plan     CAD prior stent placement doing well no chest pain.  She takes Imdur for mild angina and she is doing tolerating the isosorbide 30 mg daily, I asked her to exercise regularly     Chronic atrial fibrillation with sick sinus syndrome status post pacemaker on warfarin and rate controlled with Toprol-XL.  I discussed options of atrial flutter ablation and Novacks however patient wants to continue medical therapy and Coumadin at this point.  Echo 11/20 with EF of 56-60%. LA was moderately dilated.   Continue metoprolol XL and warfarin.      Hyperlipidemia on pravastatin      Diabetes on metformin and A1c is good.     Hypothyroidism on Synthroid supplements    Shortness of breath:  Echo with normal LV sysolic function, diastolic function was indeterminate.  Her symptoms are predominantly when she is sleeping bedtime.  She has been seen by pulmonary has been started on inhalers.    Also has been diagnosed with severe sleep apnea.  He does not appear fluid overload.  We will  have her take extra dose of Lasix in the afternoon and see if that helps.    Prevention:  Patient's Body mass index is 28.65 kg/m². BMI is above normal parameters. Recommendations include: exercise counseling and nutrition counseling.      Kirsten Rosales  reports that she has quit smoking. She has never used smokeless tobacco..    AAA Screening:   Not needed            This document has been electronically signed by Jayda Myers MD on October 18, 2021 10:36 CDT

## 2021-10-18 NOTE — PROGRESS NOTES
Sleep Clinic Follow Up    Date: 10/18/2021  Primary Care Provider: Donis Leija MD    Last office visit: 09/15/2021 (I reviewed this note)    CC: Follow up: JOSEPH on BiPAP S/T      Interim History:  Since the last visit:    1) severe complex sleep apnea -  Kirsten Rosales has not remained compliant with BiPAP. She reports dry mouth, pressure intolerance, mask issues and air leak.   She denies morning headaches, abnormal dreams, sleep paralysis, nasal congestion, URI sx.    Since pressure increase last week to 18/10 cm h2O with breath rate still at 10 her compliance has decreased. She had company over the weekend so she slept in a recliner without her machine. She still does not like the mask she has and she can hear and feel air leaking. She has a chin strap but does not like to wear it. Currently wearing nasal mask without chin strap. She wants to go back to [facility] to try a nasal mask like her son has.  Does not like FFM because she feels like she is smothering. Her mouth is extremely dry.     2) Patient denies RLS symptoms.       3) Insomnia- She took her last Rozerem 8 mg last night. States medicine is not helping with insomnia. Still taking 2-3 hours to fall asleep. She tried Restoril in the past but states her PCP would no longer prescribe.     Sleep Testin. PSG on 2021, AHI of 30   2. CPAP titration on 2021, recommended 17/8 cm H2O , with breath rate of 12  3. Currently on 18/10 cm H2O breath rate of 10 bpm    PAP Data:    Time frame: 10/11/2021-10/18/2021   Compliance: 38 %  Average use on days used: 1hrs 6 min  Percent of days with usage greater than or equal to 4 hours: 0%  PAP range: 18/10 cm H2O, breath rate of 10 bpm  Leak: 39.6 L/ minutes  Average AHI: 13.3 events/hr  Mask type: Nasal mask  [facility]: [facility]     Bed time: 9958-8051  Sleep latency: 120 minutes  Number of times awakens during the night: 2  Wake time: 2349-7114  Estimated total sleep time at night: 4-5 hours  Caffeine  intake: 1 cups of coffee, 2 cups of tea, and 0 sodas per day  Alcohol intake: 0 drinks per week  Nap time: denies    Sleepiness with Driving: denies      [place] - 2        PMHx, FH, SH reviewed and pertinent changes are:  unchanged from last office visit on 09/15/2021      REVIEW OF SYSTEMS:   Positive: SOA with exertion   Negative for chest pain, fever, chills, cough, N/V/D, abdominal pain.    Smoking:none           Exam:  Vitals:    10/18/21 1110   BP: 173/94   Pulse: 84   SpO2: 90%           10/18/21  1110   Weight: 85.3 kg (188 lb)     Body mass index is 28.59 kg/m². Patient's Body mass index is 28.59 kg/m². indicating that she is overweight (BMI 25-29.9). Obesity-related health conditions include the following: obstructive sleep apnea. Obesity is unchanged. BMI is is above average; BMI management plan is completed. We discussed portion control and increasing exercise..      Gen:                No distress, conversant, pleasant, appears stated age, alert, oriented  Eyes:               Anicteric sclera, moist conjunctiva, no lid lag                           EOMI   Lungs:             normal effort, non-labored breathing                          Clear to auscultation bilaterally          CV:                  Normal rate, rhythm irregular                           no lower extremity edema               Psych:             Appropriate affect  Neuro:             CN 2-12 appear intact    Past Medical History:   Diagnosis Date   • Atherosclerotic heart disease of native coronary artery with unspecified angina pectoris (HCC)    • Atrial fibrillation (HCC)    • Breath shortness    • CAD (coronary artery disease)    • Hyperlipidemia    • Long term (current) use of anticoagulants    • Sick sinus syndrome (HCC)    • Unspecified hypothyroidism        Current Outpatient Medications:   •  albuterol sulfate  (90 Base) MCG/ACT inhaler, Inhale 2 puffs., Disp: , Rfl:   •  furosemide (LASIX) 20 MG tablet, TAKE ONE TABLET BY  MOUTH EVERY MORNING, Disp: , Rfl:   •  isosorbide mononitrate (IMDUR) 30 MG 24 hr tablet, TAKE ONE TABLET BY MOUTH DAILY, Disp: 90 tablet, Rfl: 3  •  levothyroxine (SYNTHROID, LEVOTHROID) 112 MCG tablet, Take 100 mcg by mouth Daily., Disp: , Rfl:   •  magnesium oxide (MAG-OX) 400 MG tablet, Take 400 mg by mouth., Disp: , Rfl:   •  metFORMIN (GLUCOPHAGE) 500 MG tablet, Take 500 mg by mouth 2 (Two) Times a Day., Disp: , Rfl:   •  metoprolol succinate XL (TOPROL-XL) 50 MG 24 hr tablet, TAKE ONE TABLET BY MOUTH TWICE A DAY, Disp: 180 tablet, Rfl: 2  •  Omega-3 Fatty Acids (OMEGA 3 PO), Take 2,000 mg by mouth Daily., Disp: , Rfl:   •  oxybutynin XL (DITROPAN-XL) 10 MG 24 hr tablet, TAKE ONE TABLET BY MOUTH DAILY, Disp: , Rfl:   •  pravastatin (PRAVACHOL) 40 MG tablet, Take 1 tablet by mouth Daily., Disp: 90 tablet, Rfl: 3  •  ramelteon (Rozerem) 8 MG tablet, Take 1 tablet by mouth Every Night., Disp: 30 tablet, Rfl: 0  •  Ubiquinone (ULTRA COQ10 PO), Take 1 tablet by mouth Daily., Disp: , Rfl:   •  VITAMIN D, CHOLECALCIFEROL, PO, Take  by mouth., Disp: , Rfl:   •  warfarin (COUMADIN) 5 MG tablet, TAKE ONE TABLET BY MOUTH ONCE NIGHTLY OR AS DIRECTED BY THE COUMADIN CLINIC, Disp: 30 tablet, Rfl: 5      Assessment and Plan:    1. Complex sleep apnea  1. Not compliant with PAP therapy- encouraged increased compliance   2. Continue PAP as prescribed- keep settings same for now   3. DME for mask fitting, wear chin strap with nasal mask   4. Script for PAP supplies  5. I recommended titration study to qualify patient for ASV machine. Current BiPAP S/T machine does not report central vs obstructive apneas. I explained difference between biPAP S/T and ASV to patient. She does not want to undergo further testing at this time. She reports she will wear the machine and will go to [facility] today for new mask.   6. Drowsy driving tips- do not drive if feeling sleepy   7. Return to clinic in 6 weeks with compliance report unless change  in symptoms in interim period  3. Insomnia - sleep onset and or maintenance - Established, worsening (2)   1. Good sleep hygiene   2. Stop Rozerem   3. Discussed with patient that Restoril not great option as sleep aid because it may suppress respiratory symptom and worsen apnea   4. Start trazodone 50 mg nightly- discussed usage, dosage and possible side effects  5. Return to clinic in 6 weeks, or sooner if needed             I spent 25 minutes caring for Kirsten on this date of service. This time includes time spent by me in the following activities: preparing for the visit, obtaining and/or reviewing a separately obtained history, performing a medically appropriate examination and/or evaluation, counseling and educating the patient/family/caregiver, ordering medications, tests, or procedures and documenting information in the medical record.           This document has been electronically signed by MICKIE Newell on October 18, 2021 11:35 CDT            CC: Donis Leija MD          No ref. provider found

## 2021-10-21 LAB
QT INTERVAL: 102 MS
QTC INTERVAL: 163 MS

## 2021-11-10 ENCOUNTER — ANTICOAGULATION VISIT (OUTPATIENT)
Dept: CARDIAC SURGERY | Facility: CLINIC | Age: 81
End: 2021-11-10

## 2021-11-10 DIAGNOSIS — Z51.81 ENCOUNTER FOR THERAPEUTIC DRUG MONITORING: ICD-10-CM

## 2021-11-10 DIAGNOSIS — Z79.01 LONG TERM CURRENT USE OF ANTICOAGULANT THERAPY: Primary | ICD-10-CM

## 2021-11-10 DIAGNOSIS — I48.91 ATRIAL FIBRILLATION, UNSPECIFIED TYPE (HCC): ICD-10-CM

## 2021-11-10 LAB — INR PPP: 2.7

## 2021-11-10 NOTE — PROGRESS NOTES
I spoke to pt over the phone who self-tested today.  Pt denies medication changes or bleeding issues.  Verified coumadin dose and instructed for pt to continue the same.  Instructed pt to self-test again on Wed, Dec the 8th.  Instructions verbalized.  Findings reported by Baron Herrera RN.   Today's INR is Lab Results - Last 18 Months   Lab Units 11/10/21  0000   INR  2.70

## 2021-11-29 ENCOUNTER — OFFICE VISIT (OUTPATIENT)
Dept: SLEEP MEDICINE | Facility: HOSPITAL | Age: 81
End: 2021-11-29

## 2021-11-29 VITALS
HEART RATE: 69 BPM | OXYGEN SATURATION: 95 % | BODY MASS INDEX: 27.51 KG/M2 | WEIGHT: 181.5 LBS | HEIGHT: 68 IN | SYSTOLIC BLOOD PRESSURE: 151 MMHG | DIASTOLIC BLOOD PRESSURE: 75 MMHG

## 2021-11-29 DIAGNOSIS — G47.31 COMPLEX SLEEP APNEA SYNDROME: Primary | ICD-10-CM

## 2021-11-29 DIAGNOSIS — Z11.59 SCREENING FOR VIRAL DISEASE: ICD-10-CM

## 2021-11-29 DIAGNOSIS — F51.04 PSYCHOPHYSIOLOGICAL INSOMNIA: ICD-10-CM

## 2021-11-29 PROCEDURE — 99213 OFFICE O/P EST LOW 20 MIN: CPT | Performed by: NURSE PRACTITIONER

## 2021-11-29 RX ORDER — TRAZODONE HYDROCHLORIDE 50 MG/1
TABLET ORAL
Qty: 60 TABLET | Refills: 3 | Status: SHIPPED | OUTPATIENT
Start: 2021-11-29 | End: 2022-05-23 | Stop reason: SDUPTHER

## 2021-11-29 NOTE — PROGRESS NOTES
Sleep Clinic Follow Up    Date: 2021  Primary Care Provider: Donis Leija MD    Last office visit: 10/18/2021 (I reviewed this note)    CC: Follow up: Complex apnea on BiPAP ST      Interim History:  Since the last visit:    1) severe complex sleep apnea -  Kirsten Rosales has not remained compliant with BiPAP. Continues to report mask issues and air leak. She has not tried chin strap. She did not try nasal mask like her son has. She has gone back to her original FFM that she started with. Does not feel as much air leak with current mask. She puts mask on every night but takes it off after several hours because cannot tolerate mask. Pressure intolerance.    She denies morning headaches, abnormal dreams, sleep paralysis, nasal congestion, URI sx.    Hours used per night compliance continues to decrease.     2) Patient denies RLS symptoms.     3) Insomnia- She is taking trazodone 50 mg nightly. Denies adverse medicine effects. Reports seems to be helping her to fall asleep faster, but still taking up to one hour most nights.     Sleep Testin. PSG on 2021, AHI of 30   2. CPAP titration on 2021, recommended 17/8 cm H2O , breath rate of 12   3. Currently on 18/10 cm H2O breath rate of 10    PAP Data:    Time frame: 10/31/2021-2021   Compliance: 93 %  Average use on days used: 1hrs 35 min  Percent of days with usage greater than or equal to 4 hours: 3%  PAP range: 18/10 cm H2O  Average 90% pressure: 18/10 cmH2O  Leak: 47.8 L/ minutes  Average AHI: 10.3 events/hr  Mask type: Full face mask  DME: [facility]     Bed time: 4807-6458  Sleep latency: 0-60 minutes  Number of times awakens during the night: 2  Wake time: 9627-2637  Estimated total sleep time at night: 5 hours  Caffeine intake: 0 cups of coffee, 2-3 cups of tea, and 0 sodas per day  Alcohol intake: 0 drinks per week  Nap time: denies    Sleepiness with Driving: denies      [place] - 4        PMHx, FH, SH reviewed and pertinent  changes are:  unchanged from last office visit on 10/18/2021      REVIEW OF SYSTEMS:   Negative for chest pain, SOA, fever, chills, cough, N/V/D, abdominal pain.    Smoking:none               Exam:  Vitals:    11/29/21 1124   BP: 151/75   Pulse: 69   SpO2: 95%           11/29/21  1124   Weight: 82.3 kg (181 lb 8 oz)     Body mass index is 27.6 kg/m². Patient's Body mass index is 27.6 kg/m². indicating that she is overweight (BMI 25-29.9). Obesity-related health conditions include the following: obstructive sleep apnea. Obesity is unchanged. BMI is is above average; BMI management plan is completed. We discussed portion control and increasing exercise..      Gen:                No distress, conversant, pleasant, appears stated age, alert, oriented  Eyes:               Anicteric sclera, moist conjunctiva, no lid lag                           EOMI   Lungs:             normal effort, non-labored breathing                          Clear to auscultation bilaterally          CV:                  Normal rate, irregular rhythm                           no lower extremity edema                 Psych:             Appropriate affect  Neuro:             CN 2-12 appear intact    Past Medical History:   Diagnosis Date   • Atherosclerotic heart disease of native coronary artery with unspecified angina pectoris (HCC)    • Atrial fibrillation (HCC)    • Breath shortness    • CAD (coronary artery disease)    • Hyperlipidemia    • Long term (current) use of anticoagulants    • Sick sinus syndrome (HCC)    • Unspecified hypothyroidism        Current Outpatient Medications:   •  furosemide (LASIX) 20 MG tablet, TAKE ONE TABLET BY MOUTH EVERY MORNING, Disp: , Rfl:   •  isosorbide mononitrate (IMDUR) 30 MG 24 hr tablet, TAKE ONE TABLET BY MOUTH DAILY, Disp: 90 tablet, Rfl: 3  •  levothyroxine (SYNTHROID, LEVOTHROID) 112 MCG tablet, Take 100 mcg by mouth Daily., Disp: , Rfl:   •  magnesium oxide (MAG-OX) 400 MG tablet, Take 400 mg by  mouth., Disp: , Rfl:   •  metFORMIN (GLUCOPHAGE) 500 MG tablet, Take 500 mg by mouth 2 (Two) Times a Day., Disp: , Rfl:   •  metoprolol succinate XL (TOPROL-XL) 50 MG 24 hr tablet, TAKE ONE TABLET BY MOUTH TWICE A DAY, Disp: 180 tablet, Rfl: 2  •  Omega-3 Fatty Acids (OMEGA 3 PO), Take 2,000 mg by mouth Daily., Disp: , Rfl:   •  oxybutynin XL (DITROPAN-XL) 10 MG 24 hr tablet, TAKE ONE TABLET BY MOUTH DAILY, Disp: , Rfl:   •  pravastatin (PRAVACHOL) 40 MG tablet, Take 1 tablet by mouth Daily., Disp: 90 tablet, Rfl: 3  •  ramelteon (Rozerem) 8 MG tablet, Take 1 tablet by mouth Every Night., Disp: 30 tablet, Rfl: 0  •  traZODone (DESYREL) 50 MG tablet, Take 1 tablet by mouth Every Night., Disp: 30 tablet, Rfl: 0  •  Ubiquinone (ULTRA COQ10 PO), Take 1 tablet by mouth Daily., Disp: , Rfl:   •  VITAMIN D, CHOLECALCIFEROL, PO, Take  by mouth., Disp: , Rfl:   •  warfarin (COUMADIN) 5 MG tablet, TAKE ONE TABLET BY MOUTH ONCE NIGHTLY OR AS DIRECTED BY THE COUMADIN CLINIC, Disp: 30 tablet, Rfl: 5      Assessment and Plan:    1. Complex sleep apnea  -  1. Not compliant with PAP therapy- she understands health risks of untreated sleep apnea and is willing to come in for titration study   2. Continue PAP as prescribed, pressure change to 18/12 cm H2O breath rate of 10   3. Compliance report does not report obstructive versus central apneas   4. Titration study ordered to qualify patient for ASV machine, COVID swab prior to study   5. Echo on 11/16/2020 EF was 56-60%  6. Script for PAP supplies  7. Drowsy driving tips- do not drive if feeling sleepy   8. Return to clinic 2 weeks after study for results, or sooner if needed   3. Insomnia - sleep onset and or maintenance - Established, improved (1)   1. Refill trazodone 50 mg nightly- make take 1-2 tabs   2. Good sleep hygiene        An in lab PAP titration study has been ordered. The patient was educated regarding the recent safety recall of [employer] CPAP/BiPAP  machines, and the continued usage of these machines at [facility] [place]. The patient was informed of the safety precautions being used to decrease potential risks of machine usage during the titration study, including: in-line filter usage, and the implementation of single-use, disposable masks and tubing. While these steps do not completely eliminate the risk of potential exposure to particulates and emissions from the machine's sound absorbing foam, the health risks of untreated or poorly controlled sleep apnea outweigh the potential risk of particulate or emission exposure. The patient has made a well-informed decision to proceed with scheduling testing and has also been given additional printed safety information for their own viewing purposes.        I spent 20 minutes caring for Kirsten on this date of service. This time includes time spent by me in the following activities: preparing for the visit, obtaining and/or reviewing a separately obtained history, performing a medically appropriate examination and/or evaluation, counseling and educating the patient/family/caregiver, ordering medications, tests, or procedures and documenting information in the medical record.           This document has been electronically signed by MICKIE Newell on November 29, 2021 11:26 CST            CC: Donis Leija MD          No ref. provider found

## 2021-12-13 ENCOUNTER — ANTICOAGULATION VISIT (OUTPATIENT)
Dept: CARDIAC SURGERY | Facility: CLINIC | Age: 81
End: 2021-12-13

## 2021-12-13 DIAGNOSIS — I48.91 ATRIAL FIBRILLATION, UNSPECIFIED TYPE (HCC): ICD-10-CM

## 2021-12-13 DIAGNOSIS — Z79.01 LONG TERM CURRENT USE OF ANTICOAGULANT THERAPY: Primary | ICD-10-CM

## 2021-12-13 DIAGNOSIS — Z51.81 ENCOUNTER FOR THERAPEUTIC DRUG MONITORING: ICD-10-CM

## 2021-12-13 LAB — INR PPP: 3.9

## 2021-12-13 NOTE — PROGRESS NOTES
Pt called to report overdue INR from [facility]. Pt states she has been on Trazodone for 2 weeks which shows potential to lower INR. Pt also reports taking 2-4 Tylenol per day from injuring her knee in a fall. Pt denies bleeding problems other than bruising to her knee. Pt was instructed to hold coumadin tonight and eat a serving of green veggies today; pt repeated back correctly. Patient instructed regarding medication; results given and questions answered. Nutritional counseling given.  Dietary factors affecting therapy addressed.  Patient instructed to monitor for excessive bruising or bleeding. Findings reported by Annabelle Davis RN.    Today's INR is Lab Results - Last 18 Months   Lab Units 12/13/21  0000   INR  3.90

## 2021-12-21 ENCOUNTER — ANTICOAGULATION VISIT (OUTPATIENT)
Dept: CARDIAC SURGERY | Facility: CLINIC | Age: 81
End: 2021-12-21

## 2021-12-21 DIAGNOSIS — Z79.01 LONG TERM CURRENT USE OF ANTICOAGULANT THERAPY: Primary | ICD-10-CM

## 2021-12-21 DIAGNOSIS — Z51.81 ENCOUNTER FOR THERAPEUTIC DRUG MONITORING: ICD-10-CM

## 2021-12-21 DIAGNOSIS — I48.91 ATRIAL FIBRILLATION, UNSPECIFIED TYPE (HCC): ICD-10-CM

## 2021-12-21 LAB — INR PPP: 2

## 2021-12-21 PROCEDURE — 93793 ANTICOAG MGMT PT WARFARIN: CPT | Performed by: NURSE PRACTITIONER

## 2022-01-05 LAB — INR PPP: 1.9

## 2022-01-06 ENCOUNTER — ANTICOAGULATION VISIT (OUTPATIENT)
Dept: CARDIAC SURGERY | Facility: CLINIC | Age: 82
End: 2022-01-06

## 2022-01-06 DIAGNOSIS — Z79.01 LONG TERM CURRENT USE OF ANTICOAGULANT THERAPY: Primary | ICD-10-CM

## 2022-01-06 DIAGNOSIS — I48.91 ATRIAL FIBRILLATION, UNSPECIFIED TYPE: ICD-10-CM

## 2022-01-06 DIAGNOSIS — Z51.81 ENCOUNTER FOR THERAPEUTIC DRUG MONITORING: ICD-10-CM

## 2022-01-06 NOTE — PROGRESS NOTES
I spoke to pt over the phone who self-tested yesterday but did not receive result fax until after office hours.  Pt denies missed coumadin doses.  Pt states medications have not changed; no bleeding issues.  I adjusted coumadin dose and instructed pt to limit green intake for two days.  Instructed pt to self-test again on Wed, Jan 12th.  Instructions verbalized.  Findings reported by Baron Herrera RN.   Today's INR is Lab Results - Last 18 Months   Lab Units 01/05/22  0000   INR  1.90

## 2022-01-12 ENCOUNTER — ANTICOAGULATION VISIT (OUTPATIENT)
Dept: CARDIAC SURGERY | Facility: CLINIC | Age: 82
End: 2022-01-12

## 2022-01-12 DIAGNOSIS — I48.91 ATRIAL FIBRILLATION, UNSPECIFIED TYPE: ICD-10-CM

## 2022-01-12 DIAGNOSIS — Z79.01 LONG TERM CURRENT USE OF ANTICOAGULANT THERAPY: Primary | ICD-10-CM

## 2022-01-12 DIAGNOSIS — Z51.81 ENCOUNTER FOR THERAPEUTIC DRUG MONITORING: ICD-10-CM

## 2022-01-12 LAB — INR PPP: 2.5

## 2022-01-12 NOTE — PROGRESS NOTES
Pt called to report INR from [facility]. Pt denied med changes or bleeding problems. Pt verified current dose. Pt instructed to continue current dose and eat green veggies 1-2 times per week; pt verbalized. Patient instructed regarding medication; results given and questions answered. Nutritional counseling given.  Dietary factors affecting therapy addressed.  Patient instructed to monitor for excessive bruising or bleeding problems. Pt instructed to retest on 1/26; pt verbalized. Findings reported by Annabelle Davis RN.    Today's INR is Lab Results - Last 18 Months   Lab Units 01/12/22  0000   INR  2.50

## 2022-01-17 ENCOUNTER — LAB (OUTPATIENT)
Dept: LAB | Facility: HOSPITAL | Age: 82
End: 2022-01-17

## 2022-01-17 DIAGNOSIS — Z11.59 SCREENING FOR VIRAL DISEASE: ICD-10-CM

## 2022-01-17 LAB — SARS-COV-2 N GENE RESP QL NAA+PROBE: NOT DETECTED

## 2022-01-17 PROCEDURE — 87635 SARS-COV-2 COVID-19 AMP PRB: CPT

## 2022-01-17 PROCEDURE — C9803 HOPD COVID-19 SPEC COLLECT: HCPCS

## 2022-01-18 ENCOUNTER — HOSPITAL ENCOUNTER (OUTPATIENT)
Dept: SLEEP MEDICINE | Facility: HOSPITAL | Age: 82
Discharge: HOME | End: 2022-01-18

## 2022-01-18 DIAGNOSIS — G47.31 COMPLEX SLEEP APNEA SYNDROME: Primary | ICD-10-CM

## 2022-01-18 PROCEDURE — 93296 REM INTERROG EVL PM/IDS: CPT | Performed by: NURSE PRACTITIONER

## 2022-01-18 PROCEDURE — 93294 REM INTERROG EVL PM/LDLS PM: CPT | Performed by: NURSE PRACTITIONER

## 2022-01-18 NOTE — PROGRESS NOTES
Spoke with patient over the phone. Able to get ASV machine approved for her complex sleep apnea without having to repeat titration study. She has failed BIPAP S/T. Will send order to DME. Will order ASV at max IPAP 25, EPAP 10-15 cm H2O, ps 0-8 cm H2O and breath rate auto based on her titration from 05/28/2021.  Follow-up as scheduled next month.

## 2022-01-20 DIAGNOSIS — G47.31 COMPLEX SLEEP APNEA SYNDROME: Primary | ICD-10-CM

## 2022-01-20 NOTE — PROGRESS NOTES
Given ASV use, will order repeat Echo to monitor LV EF.      Last in Nov 20 with EF 56%--60%.     Khari Aragon II, MD  01/20/22 @ 2:46 PM CST

## 2022-01-31 ENCOUNTER — ANTICOAGULATION VISIT (OUTPATIENT)
Dept: CARDIAC SURGERY | Facility: CLINIC | Age: 82
End: 2022-01-31

## 2022-01-31 DIAGNOSIS — I48.91 ATRIAL FIBRILLATION, UNSPECIFIED TYPE: ICD-10-CM

## 2022-01-31 DIAGNOSIS — Z79.01 LONG TERM CURRENT USE OF ANTICOAGULANT THERAPY: Primary | ICD-10-CM

## 2022-01-31 DIAGNOSIS — Z51.81 ENCOUNTER FOR THERAPEUTIC DRUG MONITORING: ICD-10-CM

## 2022-01-31 LAB — INR PPP: 2.9

## 2022-01-31 NOTE — PROGRESS NOTES
We received an INR via fax on 1/28 with pt INR result. I called pt today who stated she forgot to test on her scheduled date. Pt was advised if this were to happen she does not need to test on a day we are not in the office; pt verbalized. Pt denied med changes or bleeding problems. Pt verified dose and states she is eating green veggies twice weekly. Pt instructed to continue current dose and diet and recheck INR on 2/16; pt repeated back correctly. Patient instructed regarding medication; results given and questions answered. Nutritional counseling given.  Dietary factors affecting therapy addressed.  Patient instructed to monitor for excessive bruising or bleeding. Findings reported by Annabelle Davis RN.    Today's INR is Lab Results - Last 18 Months   Lab Units 01/28/22  0000   INR  2.90

## 2022-02-16 ENCOUNTER — ANTICOAGULATION VISIT (OUTPATIENT)
Dept: CARDIAC SURGERY | Facility: CLINIC | Age: 82
End: 2022-02-16

## 2022-02-16 DIAGNOSIS — I48.91 ATRIAL FIBRILLATION, UNSPECIFIED TYPE: ICD-10-CM

## 2022-02-16 DIAGNOSIS — Z79.01 LONG TERM CURRENT USE OF ANTICOAGULANT THERAPY: Primary | ICD-10-CM

## 2022-02-16 DIAGNOSIS — Z51.81 ENCOUNTER FOR THERAPEUTIC DRUG MONITORING: ICD-10-CM

## 2022-02-16 LAB — INR PPP: 2.6

## 2022-02-16 NOTE — PROGRESS NOTES
Pt called to report INR from [facility]. Pt denied med changes or bleeding problems. Pt verified current dose. Pt was instructed to continue current dose, current diet, and recheck INR on Wednesday 3/16; she repeated back correctly. Patient instructed regarding medication; results given and questions answered. Nutritional counseling given.  Dietary factors affecting therapy addressed.  Patient instructed to monitor for excessive bruising or bleeding. Findings reported by Annabelle Davis RN.    Today's INR is Lab Results - Last 18 Months   Lab Units 02/16/22  0000   INR  2.60

## 2022-02-24 RX ORDER — METOPROLOL SUCCINATE 50 MG/1
TABLET, EXTENDED RELEASE ORAL
Qty: 180 TABLET | Refills: 2 | Status: SHIPPED | OUTPATIENT
Start: 2022-02-24 | End: 2022-11-28

## 2022-03-15 ENCOUNTER — TELEPHONE (OUTPATIENT)
Dept: CARDIOLOGY | Facility: CLINIC | Age: 82
End: 2022-03-15

## 2022-03-15 NOTE — TELEPHONE ENCOUNTER
----- Message from Kiersten Schaefer sent at 3/15/2022  2:29 PM CDT -----  Contact: [phone]  Pt wants to move up July appt because she is having SOB with everything she does. Is this OK?    appt made for patient to see dr. Myers on Friday march 18 at 8:15 am.

## 2022-03-16 ENCOUNTER — ANTICOAGULATION VISIT (OUTPATIENT)
Dept: CARDIAC SURGERY | Facility: CLINIC | Age: 82
End: 2022-03-16

## 2022-03-16 DIAGNOSIS — Z79.01 LONG TERM CURRENT USE OF ANTICOAGULANT THERAPY: Primary | ICD-10-CM

## 2022-03-16 DIAGNOSIS — Z51.81 ENCOUNTER FOR THERAPEUTIC DRUG MONITORING: ICD-10-CM

## 2022-03-16 DIAGNOSIS — I48.91 ATRIAL FIBRILLATION, UNSPECIFIED TYPE: ICD-10-CM

## 2022-03-16 LAB — INR PPP: 3.2

## 2022-03-16 NOTE — PROGRESS NOTES
Pt called to report INR from [facility]. Pt denied med changes other than Vitamin D3. Pt denies bleeding problems. Pt states she was eating two salads per week but this past week she hasn't eaten any. Pt states she would prefer to cut back on salads. Pt instructed on new dose and to have a serving of green veggies today; pt repeated back correctly. Patient instructed regarding medication; results given and questions answered. Nutritional counseling given.  Dietary factors affecting therapy addressed.  Patient instructed to monitor for excessive bruising or bleeding. Pt instructed to recheck INR on Wednesday 3/30; pt verbalized. Findings reported by Annabelle Davis RN.    Today's INR is Lab Results - Last 18 Months   Lab Units 03/16/22  0000   INR  3.20

## 2022-03-18 ENCOUNTER — OFFICE VISIT (OUTPATIENT)
Dept: CARDIOLOGY | Facility: CLINIC | Age: 82
End: 2022-03-18

## 2022-03-18 ENCOUNTER — LAB (OUTPATIENT)
Dept: LAB | Facility: HOSPITAL | Age: 82
End: 2022-03-18

## 2022-03-18 VITALS
DIASTOLIC BLOOD PRESSURE: 70 MMHG | BODY MASS INDEX: 27.31 KG/M2 | HEIGHT: 68 IN | WEIGHT: 180.2 LBS | HEART RATE: 105 BPM | TEMPERATURE: 96 F | OXYGEN SATURATION: 91 % | SYSTOLIC BLOOD PRESSURE: 128 MMHG

## 2022-03-18 DIAGNOSIS — R06.02 SHORTNESS OF BREATH: Primary | ICD-10-CM

## 2022-03-18 DIAGNOSIS — I25.10 CAD S/P PERCUTANEOUS CORONARY ANGIOPLASTY: ICD-10-CM

## 2022-03-18 DIAGNOSIS — I48.92 ATRIAL FLUTTER, UNSPECIFIED TYPE: ICD-10-CM

## 2022-03-18 DIAGNOSIS — Z98.61 CAD S/P PERCUTANEOUS CORONARY ANGIOPLASTY: ICD-10-CM

## 2022-03-18 LAB — NT-PROBNP SERPL-MCNC: 662 PG/ML (ref 0–1800)

## 2022-03-18 PROCEDURE — 83880 ASSAY OF NATRIURETIC PEPTIDE: CPT | Performed by: INTERNAL MEDICINE

## 2022-03-18 PROCEDURE — 99214 OFFICE O/P EST MOD 30 MIN: CPT | Performed by: INTERNAL MEDICINE

## 2022-03-18 PROCEDURE — 93000 ELECTROCARDIOGRAM COMPLETE: CPT | Performed by: INTERNAL MEDICINE

## 2022-03-18 PROCEDURE — 36415 COLL VENOUS BLD VENIPUNCTURE: CPT | Performed by: INTERNAL MEDICINE

## 2022-03-18 RX ORDER — MULTIPLE VITAMINS W/ MINERALS TAB 9MG-400MCG
1 TAB ORAL DAILY
COMMUNITY

## 2022-03-18 NOTE — PROGRESS NOTES
[facility]  OFFICE NOTE    Cardiovascular Medicine  Jayda Myers M.D., RPVI         No referring provider defined for this encounter.    Thank you for asking me to see Kirsten Rosales for afib.    Atrial Flutter    Shortness of Breath      This is a 82 y.o. female with:    1. Coronary artery disease involving native coronary artery of native heart with angina pectoris (CMS/[facility])    2. Mixed hyperlipidemia    3. Essential hypertension    4. Permanent atrial fibrillation (CMS/[facility])    5. Type 2 diabetes mellitus without complication, without long-term current use of insulin (CMS/[facility])          Chief complaint -Follow-up CAD        History of present Illness- 82 y.o.-year-old lady with history of coronary disease prior stent placement in 2005, has chronic atrial fibrillation with sick sinus syndrome on warfarin for anticoagulation.  Last cardiac cath was in 2016 which showed moderate disease in the RCA and left circumflex and patent stent in the ramus. She is doing well no chest pain or shortness of breath. she has her pacemaker checked regularly through the pacemaker clinic.  She denies any GI symptoms or CNS symptoms.     No acute issues since last visit Dr. Silver.  She occasionally has some fullness in her chest when she lays down at night however when she gets up and take a deep breath it resolves pain orthopnea or PND though.  She has been using 2 pillows.  No swelling  She has stable shortness of breath on exertion.  No chest pain.  Has been taking Coumadin without any bleeding problems.  Denying any palpitations.    11/16/2020:  Patient reported over last several months has been having worsening orthopnea.  She was hemogram her care physician BNP was mildly elevated.  Denying any chest pain or palpitations.  She did have a fall in September.    02/03/2021:  No acute issues since last visit continues to have intermittent orthopnea.  She is taking Lasix with improvement in her  symptoms.  Echocardiogram is normal LV systolic function.  No bleeding problems from Coumadin.    10/18/2021:  She was found to have severe sleep apnea and has been started on BiPAP with some improvement in her symptoms.    03/18/2022:  Reported worsening shortness of breath over the last few months, his known significant orthopnea from underlying sleep apnea which is stable at baseline.  No PND.  No lower extremity swelling.  Denying any chest pain.    Review of Systems - Review of Systems   Respiratory: Positive for shortness of breath.      Constitution: Negative for weakness, weight gain and weight loss.   HENT: Negative for congestion.    Eyes: Negative for blurred vision.   Cardiovascular: As mentioned above  Respiratory: Negative for cough and hemoptysis.    Endocrine: Negative for polydipsia and polyuria.   Hematologic/Lymphatic: Negative for bleeding problem. Does not bruise/bleed easily.   Skin: Negative for flushing.   Musculoskeletal: Negative for neck pain and stiffness.   Gastrointestinal: Negative for abdominal pain, diarrhea, jaundice, melena, nausea and vomiting.   Genitourinary: Negative for dysuria and hematuria.   Neurological: Negative for dizziness, focal weakness and numbness.   Psychiatric/Behavioral: Negative for altered mental status and depression.          All other systems were reviewed and were negative.    family history is not on file.     reports that she has quit smoking. She has never used smokeless tobacco. She reports that she does not drink alcohol and does not use drugs.    Allergies   Allergen Reactions   • Aspirin Hives   • Celebrex [Celecoxib] Hives   • Rythmol [Propafenone] Nausea And Vomiting         Current Outpatient Medications:   •  Calcium-Magnesium-Vitamin D (CALCIUM MAGNESIUM PO), Take  by mouth. With vitamin d and zinc, Disp: , Rfl:   •  Coenzyme Q10 (COQ10 PO), Take  by mouth., Disp: , Rfl:   •  Cyanocobalamin (Vitamin B12) 1000 MCG tablet controlled-release, Take  " by mouth., Disp: , Rfl:   •  furosemide (LASIX) 20 MG tablet, 20 mg 2 (Two) Times a Day., Disp: , Rfl:   •  isosorbide mononitrate (IMDUR) 30 MG 24 hr tablet, TAKE ONE TABLET BY MOUTH DAILY, Disp: 90 tablet, Rfl: 3  •  levothyroxine (SYNTHROID, LEVOTHROID) 112 MCG tablet, Take 100 mcg by mouth Daily., Disp: , Rfl:   •  metFORMIN (GLUCOPHAGE) 500 MG tablet, Take 500 mg by mouth 2 (Two) Times a Day., Disp: , Rfl:   •  metoprolol succinate XL (TOPROL-XL) 50 MG 24 hr tablet, TAKE ONE TABLET BY MOUTH TWICE A DAY, Disp: 180 tablet, Rfl: 2  •  multivitamin with minerals tablet tablet, Take 1 tablet by mouth Daily., Disp: , Rfl:   •  Omega-3 Fatty Acids (OMEGA 3 PO), Take 1,800 mg by mouth Daily., Disp: , Rfl:   •  pravastatin (PRAVACHOL) 40 MG tablet, Take 1 tablet by mouth Daily., Disp: 90 tablet, Rfl: 3  •  traZODone (DESYREL) 50 MG tablet, Take 1-2 tabs nightly as needed for insomnia, Disp: 60 tablet, Rfl: 3  •  VITAMIN D, CHOLECALCIFEROL, PO, Take  by mouth., Disp: , Rfl:   •  warfarin (COUMADIN) 5 MG tablet, TAKE ONE TABLET BY MOUTH ONCE NIGHTLY OR AS DIRECTED BY THE COUMADIN CLINIC, Disp: 30 tablet, Rfl: 5  •  magnesium oxide (MAG-OX) 400 MG tablet, Take 400 mg by mouth., Disp: , Rfl:   •  oxybutynin XL (DITROPAN-XL) 10 MG 24 hr tablet, TAKE ONE TABLET BY MOUTH DAILY, Disp: , Rfl:   •  ramelteon (Rozerem) 8 MG tablet, Take 1 tablet by mouth Every Night., Disp: 30 tablet, Rfl: 0  •  Ubiquinone (ULTRA COQ10 PO), Take 1 tablet by mouth Daily., Disp: , Rfl:     Physical Exam:  Vitals:    03/18/22 0821   BP: 128/70   BP Location: Left arm   Patient Position: Sitting   Cuff Size: Adult   Pulse: 105   Temp: 96 °F (35.6 °C)   SpO2: 91%   Weight: 81.7 kg (180 lb 3.2 oz)   Height: 172.7 cm (68\")   PainSc: 0-No pain     Current Pain Level: none  Pulse Ox: Normal  on room air  General: alert, appears stated age and cooperative     Body Habitus: well-nourished    HEENT: Head: Normocephalic, no lesions, without obvious " abnormality. No arcus senilis, xanthelasma or xanthomas.    Neuro: alert, oriented x3  Pulses: 2+ and symmetric  JVP: Volume/Pulsation: Normal.  Normal waveforms.   Appropriate inspiratory decrease.  No Kussmaul's. No Jaylin's.   Carotid Exam: no bruit normal pulsation bilaterally   Carotid Volume: normal.     Respirations: no increased work of breathing   Chest:  Normal    Pulmonary:Normal   Precordium: Normal impulses. P2 is not palpable.  RV Heave: absent  LV Heave: absent  Katy:  normal size and placement  Palpable S4: absent.  Heart rate: normal    Heart Rhythm: irregular     Heart Sounds: S1: normal  S2: normal  S3: absent   S4: absent  Opening Snap: absent    Pericardial Rub:  Absent: .    Abdomen:   Appearance: normal .  Palpation: Soft, non-tender to palpation, bowel sounds positive in all four quadrants; no guarding or rebound tenderness  Extremity: no edema.   LE Skin: no rashes  LE Hair:  normal  LE Pulses: well perfused with normal pulses in the distal extremities  Pallor on elevation: Absent. Rubor on dependency: None      DATA REVIEWED:     EKG. I personally reviewed and interpreted the EKG.  Atrial flutter with variable AV block and intermittent v paced rhythm    ECG/EMG Results (all)     None        ---------------------------------------------------  TTE/DEENA:  Results for orders placed in visit on 11/16/20    Adult Transthoracic Echo Complete W/ Cont if Necessary Per Protocol    Interpretation Summary  · Left ventricular ejection fraction appears to be 56 - 60%. Left ventricular systolic function is normal.  · Estimated right ventricular systolic pressure from tricuspid regurgitation is normal (<35 mmHg).  · Left atrial volume is moderately increased.  · Left ventricular diastolic function was indeterminate.  · Mild mitral annular calcification is present. Mild mitral valve regurgitation is present with a posteriorly-directed jet noted. No significant mitral valve stenosis is  present.      --------------------------------------------------------------------------------------------------  LABS:     The CVD Risk score (Herbie et al., 2008) failed to calculate for the following reasons:    The 2008 CVD risk score is only valid for ages 30 to 74         Lab Results   Component Value Date    GLUCOSE 100 (H) 08/09/2019    BUN 14 01/22/2022    CREATININE 0.7 01/22/2022    EGFRIFNONA 70 08/09/2019    EGFRIFAFRI 97 06/08/2021    BCR 26.6 (H) 08/09/2019    K 4.2 01/22/2022    CO2 31 01/22/2022    CALCIUM 9.4 01/22/2022    ALBUMIN 4.1 01/22/2022    AST 62 (H) 01/22/2022    ALT 24 01/22/2022     Lab Results   Component Value Date    WBC 5.49 08/09/2019    HGB 14.2 08/09/2019    HCT 44.0 08/09/2019    MCV 89.6 08/09/2019     08/09/2019     Lab Results   Component Value Date    CHOL 165 06/13/2020    CHLPL 179 06/08/2021    TRIG 207 (H) 06/08/2021    HDL 41 06/08/2021    LDL 97 06/08/2021     Lab Results   Component Value Date    TSH 0.60 12/07/2021     Lab Results   Component Value Date    CKTOTAL 51 02/10/2014    CKMB 1.0 02/10/2014    TROPONINI <0.012 02/10/2014     Lab Results   Component Value Date    HGBA1C 6.9 (H) 12/07/2021     No results found for: DDIMER  Lab Results   Component Value Date    ALT 24 01/22/2022     Lab Results   Component Value Date    HGBA1C 6.9 (H) 12/07/2021    HGBA1C 6.7 (H) 06/08/2021    HGBA1C 6.5 (H) 09/02/2020     Lab Results   Component Value Date    CREATININE 0.7 01/22/2022     No results found for: IRON, TIBC, FERRITIN  Lab Results   Component Value Date    INR 3.20 03/16/2022    INR 2.60 02/16/2022    INR 2.90 01/28/2022    PROTIME 14.0 02/18/2016    PROTIME 15.2 02/14/2014    PROTIME 16.6 (H) 02/13/2014       Assessment/Plan     CAD prior stent placement doing well no chest pain.  She takes Imdur for mild angina and she is doing tolerating the isosorbide 30 mg daily, I asked her to exercise regularly  Allergic to aspirin    Chronic atrial  fibrillation/flutter with sick sinus syndrome status post pacemaker on warfarin and rate controlled with Toprol-XL.  I discussed options of atrial flutter ablation and Novacks however patient wants to continue medical therapy and Coumadin at this point.  Echo 11/20 with EF of 56-60%. LA was moderately dilated.   Continue metoprolol XL and warfarin.      Hyperlipidemia on pravastatin      Diabetes on metformin and A1c is good.     Hypothyroidism on Synthroid supplements    Shortness of breath: Worse  Echo with normal LV sysolic function, diastolic function was indeterminate previously.  Her symptoms are predominantly when she is sleeping bedtime.  She has been seen by pulmonary has been started on inhalers.    Also has been diagnosed with severe sleep apnea.    Now she is having worsening shortness of breath.  Recent CBC with normal hemoglobin.  We will repeat her echocardiogram  We will also do 2-day nuclear stress test to assess for ischemic component.  I explained the risk benefits alternatives limitation of nuclear stress test with the patient and the patient is agreeable.  We will also repeat PFTs  Checking BNP  .  Euvolemic on clinical exam continue current dose of Lasix  She does not exercise or stay physically active at baseline.  Have also referring her to Dr. Elam for consideration for flutter ablation see if that helps with her symptoms.      Prevention:  Patient's Body mass index is 27.4 kg/m². BMI is above normal parameters. Recommendations include: exercise counseling and nutrition counseling.      Kirsten Rosales  reports that she has quit smoking. She has never used smokeless tobacco..    AAA Screening:   Not needed            This document has been electronically signed by Jayda Myers MD on March 18, 2022 08:31 CDT

## 2022-03-22 ENCOUNTER — TELEPHONE (OUTPATIENT)
Dept: CARDIOLOGY | Facility: CLINIC | Age: 82
End: 2022-03-22

## 2022-03-22 NOTE — TELEPHONE ENCOUNTER
----- Message from Jayda Myers MD sent at 3/21/2022  9:46 AM CDT -----  BNP, which suggests if she is fluid overloaded from heart failure is normal    ----- Message -----  From: Lab, Background User  Sent: 3/18/2022   7:51 PM CDT  To: Jayda Myers MD    bnp results given to the patient per dr. Myers.

## 2022-03-23 ENCOUNTER — HOSPITAL ENCOUNTER (OUTPATIENT)
Dept: NUCLEAR MEDICINE | Facility: HOSPITAL | Age: 82
Discharge: HOME | End: 2022-03-23

## 2022-03-23 PROCEDURE — 93017 CV STRESS TEST TRACING ONLY: CPT

## 2022-03-23 PROCEDURE — A9500 TC99M SESTAMIBI: HCPCS | Performed by: INTERNAL MEDICINE

## 2022-03-23 PROCEDURE — 93018 CV STRESS TEST I&R ONLY: CPT | Performed by: INTERNAL MEDICINE

## 2022-03-23 PROCEDURE — 93016 CV STRESS TEST SUPVJ ONLY: CPT | Performed by: INTERNAL MEDICINE

## 2022-03-23 PROCEDURE — 0 TECHNETIUM SESTAMIBI: Performed by: INTERNAL MEDICINE

## 2022-03-23 PROCEDURE — 78452 HT MUSCLE IMAGE SPECT MULT: CPT | Performed by: INTERNAL MEDICINE

## 2022-03-23 PROCEDURE — 78452 HT MUSCLE IMAGE SPECT MULT: CPT

## 2022-03-23 RX ADMIN — TECHNETIUM TC 99M SESTAMIBI 1 DOSE: 1 INJECTION INTRAVENOUS at 08:30

## 2022-03-24 ENCOUNTER — HOSPITAL ENCOUNTER (OUTPATIENT)
Dept: NUCLEAR MEDICINE | Facility: HOSPITAL | Age: 82
Discharge: HOME | End: 2022-03-24

## 2022-03-24 ENCOUNTER — HOSPITAL ENCOUNTER (OUTPATIENT)
Dept: CARDIOLOGY | Facility: HOSPITAL | Age: 82
Discharge: HOME | End: 2022-03-24

## 2022-03-24 LAB
BH CV REST NUCLEAR ISOTOPE DOSE: 33.8 MCI
BH CV STRESS BP STAGE 1: NORMAL
BH CV STRESS COMMENTS STAGE 1: NORMAL
BH CV STRESS DOSE REGADENOSON STAGE 1: 0.4
BH CV STRESS DURATION MIN STAGE 1: 0
BH CV STRESS DURATION SEC STAGE 1: 10
BH CV STRESS HR STAGE 1: 83
BH CV STRESS NUCLEAR ISOTOPE DOSE: 33.7 MCI
BH CV STRESS PROTOCOL 1: NORMAL
BH CV STRESS RECOVERY BP: NORMAL MMHG
BH CV STRESS RECOVERY HR: 94 BPM
BH CV STRESS STAGE 1: 1
LV EF NUC BP: 61 %
MAXIMAL PREDICTED HEART RATE: 136 BPM
PERCENT MAX PREDICTED HR: 85.29 %
STRESS BASELINE BP: NORMAL MMHG
STRESS BASELINE HR: 90 BPM
STRESS PERCENT HR: 100 %
STRESS POST ESTIMATED WORKLOAD: 1 METS
STRESS POST EXERCISE DUR MIN: 0 MIN
STRESS POST EXERCISE DUR SEC: 43 SEC
STRESS POST PEAK BP: NORMAL MMHG
STRESS POST PEAK HR: 116 BPM
STRESS TARGET HR: 116 BPM

## 2022-03-24 PROCEDURE — A9500 TC99M SESTAMIBI: HCPCS | Performed by: INTERNAL MEDICINE

## 2022-03-24 PROCEDURE — 25010000002 REGADENOSON 0.4 MG/5ML SOLUTION: Performed by: INTERNAL MEDICINE

## 2022-03-24 PROCEDURE — 0 TECHNETIUM SESTAMIBI: Performed by: INTERNAL MEDICINE

## 2022-03-24 RX ORDER — SODIUM CHLORIDE 0.9 % (FLUSH) 0.9 %
10 SYRINGE (ML) INJECTION ONCE
Status: COMPLETED | OUTPATIENT
Start: 2022-03-24 | End: 2022-03-24

## 2022-03-24 RX ADMIN — TECHNETIUM TC 99M SESTAMIBI 1 DOSE: 1 INJECTION INTRAVENOUS at 09:36

## 2022-03-24 RX ADMIN — REGADENOSON 0.4 MG: 0.08 INJECTION, SOLUTION INTRAVENOUS at 09:32

## 2022-03-24 RX ADMIN — Medication 10 ML: at 09:36

## 2022-03-25 ENCOUNTER — HOSPITAL ENCOUNTER (INPATIENT)
Facility: HOSPITAL | Age: 82
LOS: 3 days | Discharge: HOME | End: 2022-03-28
Attending: EMERGENCY MEDICINE | Admitting: FAMILY MEDICINE

## 2022-03-25 ENCOUNTER — APPOINTMENT (OUTPATIENT)
Dept: GENERAL RADIOLOGY | Facility: HOSPITAL | Age: 82
End: 2022-03-25

## 2022-03-25 DIAGNOSIS — I10 ESSENTIAL HYPERTENSION: ICD-10-CM

## 2022-03-25 DIAGNOSIS — R06.00 DYSPNEA, UNSPECIFIED TYPE: ICD-10-CM

## 2022-03-25 DIAGNOSIS — Z79.01 LONG TERM CURRENT USE OF ANTICOAGULANT THERAPY: ICD-10-CM

## 2022-03-25 DIAGNOSIS — I25.119 CORONARY ARTERY DISEASE INVOLVING NATIVE CORONARY ARTERY OF NATIVE HEART WITH ANGINA PECTORIS: ICD-10-CM

## 2022-03-25 DIAGNOSIS — I25.10 ATHEROSCLEROSIS OF NATIVE CORONARY ARTERY OF NATIVE HEART, UNSPECIFIED WHETHER ANGINA PRESENT: ICD-10-CM

## 2022-03-25 DIAGNOSIS — J18.9 ATYPICAL PNEUMONIA: ICD-10-CM

## 2022-03-25 DIAGNOSIS — Z95.0 PACEMAKER: ICD-10-CM

## 2022-03-25 DIAGNOSIS — F41.9 ANXIETY: ICD-10-CM

## 2022-03-25 DIAGNOSIS — J47.9 BRONCHIECTASIS WITHOUT COMPLICATION: ICD-10-CM

## 2022-03-25 DIAGNOSIS — R06.01 ORTHOPNEA: ICD-10-CM

## 2022-03-25 DIAGNOSIS — Z51.81 ENCOUNTER FOR THERAPEUTIC DRUG MONITORING: ICD-10-CM

## 2022-03-25 DIAGNOSIS — I49.5 SSS (SICK SINUS SYNDROME): ICD-10-CM

## 2022-03-25 DIAGNOSIS — G47.09 OTHER INSOMNIA: ICD-10-CM

## 2022-03-25 DIAGNOSIS — J96.01 ACUTE RESPIRATORY FAILURE WITH HYPOXIA: Primary | ICD-10-CM

## 2022-03-25 DIAGNOSIS — E78.2 MIXED HYPERLIPIDEMIA: ICD-10-CM

## 2022-03-25 DIAGNOSIS — G47.33 OSA (OBSTRUCTIVE SLEEP APNEA): ICD-10-CM

## 2022-03-25 DIAGNOSIS — I48.20 CHRONIC ATRIAL FIBRILLATION: ICD-10-CM

## 2022-03-25 DIAGNOSIS — Z13.820 ENCOUNTER FOR SCREENING FOR OSTEOPOROSIS: ICD-10-CM

## 2022-03-25 DIAGNOSIS — I48.91 ATRIAL FIBRILLATION, UNSPECIFIED TYPE: ICD-10-CM

## 2022-03-25 DIAGNOSIS — N39.46 MIXED INCONTINENCE: ICD-10-CM

## 2022-03-25 LAB
ALBUMIN SERPL-MCNC: 4.3 G/DL (ref 3.5–5.2)
ALBUMIN/GLOB SERPL: 1.6 G/DL
ALP SERPL-CCNC: 87 U/L (ref 39–117)
ALT SERPL W P-5'-P-CCNC: 40 U/L (ref 1–33)
ANION GAP SERPL CALCULATED.3IONS-SCNC: 13 MMOL/L (ref 5–15)
ANION GAP SERPL CALCULATED.3IONS-SCNC: 8 MMOL/L (ref 5–15)
AST SERPL-CCNC: 74 U/L (ref 1–32)
BACTERIA UR QL AUTO: ABNORMAL /HPF
BASOPHILS # BLD AUTO: 0.05 10*3/MM3 (ref 0–0.2)
BASOPHILS # BLD AUTO: 0.06 10*3/MM3 (ref 0–0.2)
BASOPHILS NFR BLD AUTO: 0.4 % (ref 0–1.5)
BASOPHILS NFR BLD AUTO: 0.7 % (ref 0–1.5)
BILIRUB SERPL-MCNC: 0.8 MG/DL (ref 0–1.2)
BILIRUB UR QL STRIP: NEGATIVE
BUN SERPL-MCNC: 10 MG/DL (ref 8–23)
BUN SERPL-MCNC: 12 MG/DL (ref 8–23)
BUN/CREAT SERPL: 14.3 (ref 7–25)
BUN/CREAT SERPL: 14.3 (ref 7–25)
CALCIUM SPEC-SCNC: 8.6 MG/DL (ref 8.6–10.5)
CALCIUM SPEC-SCNC: 9.1 MG/DL (ref 8.6–10.5)
CHLORIDE SERPL-SCNC: 101 MMOL/L (ref 98–107)
CHLORIDE SERPL-SCNC: 101 MMOL/L (ref 98–107)
CLARITY UR: CLEAR
CO2 SERPL-SCNC: 26 MMOL/L (ref 22–29)
CO2 SERPL-SCNC: 31 MMOL/L (ref 22–29)
COLOR UR: YELLOW
CREAT SERPL-MCNC: 0.7 MG/DL (ref 0.57–1)
CREAT SERPL-MCNC: 0.84 MG/DL (ref 0.57–1)
D-LACTATE SERPL-SCNC: 2.3 MMOL/L (ref 0.5–2)
D-LACTATE SERPL-SCNC: 3.2 MMOL/L (ref 0.5–2)
DEPRECATED RDW RBC AUTO: 47.7 FL (ref 37–54)
DEPRECATED RDW RBC AUTO: 47.9 FL (ref 37–54)
EGFRCR SERPLBLD CKD-EPI 2021: 68.6 ML/MIN/1.73
EGFRCR SERPLBLD CKD-EPI 2021: 85.4 ML/MIN/1.73
EOSINOPHIL # BLD AUTO: 0.08 10*3/MM3 (ref 0–0.4)
EOSINOPHIL # BLD AUTO: 0.13 10*3/MM3 (ref 0–0.4)
EOSINOPHIL NFR BLD AUTO: 0.6 % (ref 0.3–6.2)
EOSINOPHIL NFR BLD AUTO: 1.6 % (ref 0.3–6.2)
ERYTHROCYTE [DISTWIDTH] IN BLOOD BY AUTOMATED COUNT: 14.3 % (ref 12.3–15.4)
ERYTHROCYTE [DISTWIDTH] IN BLOOD BY AUTOMATED COUNT: 14.5 % (ref 12.3–15.4)
FLUAV SUBTYP SPEC NAA+PROBE: NOT DETECTED
FLUBV RNA ISLT QL NAA+PROBE: NOT DETECTED
GLOBULIN UR ELPH-MCNC: 2.7 GM/DL
GLUCOSE BLDC GLUCOMTR-MCNC: 174 MG/DL (ref 70–130)
GLUCOSE SERPL-MCNC: 153 MG/DL (ref 65–99)
GLUCOSE SERPL-MCNC: 234 MG/DL (ref 65–99)
GLUCOSE UR STRIP-MCNC: ABNORMAL MG/DL
HCT VFR BLD AUTO: 39.9 % (ref 34–46.6)
HCT VFR BLD AUTO: 41.1 % (ref 34–46.6)
HGB BLD-MCNC: 13 G/DL (ref 12–15.9)
HGB BLD-MCNC: 13.1 G/DL (ref 12–15.9)
HGB UR QL STRIP.AUTO: NEGATIVE
HOLD SPECIMEN: NORMAL
HYALINE CASTS UR QL AUTO: ABNORMAL /LPF
IMM GRANULOCYTES # BLD AUTO: 0.03 10*3/MM3 (ref 0–0.05)
IMM GRANULOCYTES # BLD AUTO: 0.08 10*3/MM3 (ref 0–0.05)
IMM GRANULOCYTES NFR BLD AUTO: 0.4 % (ref 0–0.5)
IMM GRANULOCYTES NFR BLD AUTO: 0.6 % (ref 0–0.5)
INR PPP: 2.78 (ref 0.8–1.2)
INR PPP: 2.84 (ref 0.8–1.2)
KETONES UR QL STRIP: NEGATIVE
LEUKOCYTE ESTERASE UR QL STRIP.AUTO: NEGATIVE
LYMPHOCYTES # BLD AUTO: 1.56 10*3/MM3 (ref 0.7–3.1)
LYMPHOCYTES # BLD AUTO: 1.85 10*3/MM3 (ref 0.7–3.1)
LYMPHOCYTES NFR BLD AUTO: 14.5 % (ref 19.6–45.3)
LYMPHOCYTES NFR BLD AUTO: 19.4 % (ref 19.6–45.3)
MAGNESIUM SERPL-MCNC: 1.9 MG/DL (ref 1.6–2.4)
MCH RBC QN AUTO: 28.9 PG (ref 26.6–33)
MCH RBC QN AUTO: 29.5 PG (ref 26.6–33)
MCHC RBC AUTO-ENTMCNC: 31.9 G/DL (ref 31.5–35.7)
MCHC RBC AUTO-ENTMCNC: 32.6 G/DL (ref 31.5–35.7)
MCV RBC AUTO: 90.5 FL (ref 79–97)
MCV RBC AUTO: 90.7 FL (ref 79–97)
MONOCYTES # BLD AUTO: 1.01 10*3/MM3 (ref 0.1–0.9)
MONOCYTES # BLD AUTO: 1.07 10*3/MM3 (ref 0.1–0.9)
MONOCYTES NFR BLD AUTO: 12.6 % (ref 5–12)
MONOCYTES NFR BLD AUTO: 8.4 % (ref 5–12)
NEUTROPHILS NFR BLD AUTO: 5.24 10*3/MM3 (ref 1.7–7)
NEUTROPHILS NFR BLD AUTO: 65.3 % (ref 42.7–76)
NEUTROPHILS NFR BLD AUTO: 75.5 % (ref 42.7–76)
NEUTROPHILS NFR BLD AUTO: 9.62 10*3/MM3 (ref 1.7–7)
NITRITE UR QL STRIP: NEGATIVE
NRBC BLD AUTO-RTO: 0 /100 WBC (ref 0–0.2)
NRBC BLD AUTO-RTO: 0 /100 WBC (ref 0–0.2)
NT-PROBNP SERPL-MCNC: 1301 PG/ML (ref 0–1800)
PH UR STRIP.AUTO: 7 [PH] (ref 5–9)
PLATELET # BLD AUTO: 243 10*3/MM3 (ref 140–450)
PLATELET # BLD AUTO: 262 10*3/MM3 (ref 140–450)
PMV BLD AUTO: 10.7 FL (ref 6–12)
PMV BLD AUTO: 10.8 FL (ref 6–12)
POTASSIUM SERPL-SCNC: 3.9 MMOL/L (ref 3.5–5.2)
POTASSIUM SERPL-SCNC: 4.4 MMOL/L (ref 3.5–5.2)
PROT SERPL-MCNC: 7 G/DL (ref 6–8.5)
PROT UR QL STRIP: ABNORMAL
PROTHROMBIN TIME: 28.6 SECONDS (ref 11.1–15.3)
PROTHROMBIN TIME: 29.1 SECONDS (ref 11.1–15.3)
RBC # BLD AUTO: 4.4 10*6/MM3 (ref 3.77–5.28)
RBC # BLD AUTO: 4.54 10*6/MM3 (ref 3.77–5.28)
RBC # UR STRIP: ABNORMAL /HPF
REF LAB TEST METHOD: ABNORMAL
SARS-COV-2 RNA PNL SPEC NAA+PROBE: NOT DETECTED
SODIUM SERPL-SCNC: 140 MMOL/L (ref 136–145)
SODIUM SERPL-SCNC: 140 MMOL/L (ref 136–145)
SP GR UR STRIP: 1.01 (ref 1–1.03)
SQUAMOUS #/AREA URNS HPF: ABNORMAL /HPF
TROPONIN T SERPL-MCNC: <0.01 NG/ML (ref 0–0.03)
UROBILINOGEN UR QL STRIP: ABNORMAL
WBC # UR STRIP: ABNORMAL /HPF
WBC NRBC COR # BLD: 12.75 10*3/MM3 (ref 3.4–10.8)
WBC NRBC COR # BLD: 8.03 10*3/MM3 (ref 3.4–10.8)
WHOLE BLOOD HOLD SPECIMEN: NORMAL
WHOLE BLOOD HOLD SPECIMEN: NORMAL

## 2022-03-25 PROCEDURE — 93005 ELECTROCARDIOGRAM TRACING: CPT | Performed by: EMERGENCY MEDICINE

## 2022-03-25 PROCEDURE — 93010 ELECTROCARDIOGRAM REPORT: CPT | Performed by: INTERNAL MEDICINE

## 2022-03-25 PROCEDURE — 71045 X-RAY EXAM CHEST 1 VIEW: CPT

## 2022-03-25 PROCEDURE — 85025 COMPLETE CBC W/AUTO DIFF WBC: CPT | Performed by: FAMILY MEDICINE

## 2022-03-25 PROCEDURE — 99285 EMERGENCY DEPT VISIT HI MDM: CPT

## 2022-03-25 PROCEDURE — 25010000002 FUROSEMIDE PER 20 MG: Performed by: EMERGENCY MEDICINE

## 2022-03-25 PROCEDURE — 85610 PROTHROMBIN TIME: CPT | Performed by: EMERGENCY MEDICINE

## 2022-03-25 PROCEDURE — 87040 BLOOD CULTURE FOR BACTERIA: CPT | Performed by: EMERGENCY MEDICINE

## 2022-03-25 PROCEDURE — 25010000002 CEFTRIAXONE PER 250 MG: Performed by: EMERGENCY MEDICINE

## 2022-03-25 PROCEDURE — 25010000002 AZITHROMYCIN PER 500 MG: Performed by: EMERGENCY MEDICINE

## 2022-03-25 PROCEDURE — 80053 COMPREHEN METABOLIC PANEL: CPT | Performed by: EMERGENCY MEDICINE

## 2022-03-25 PROCEDURE — 82962 GLUCOSE BLOOD TEST: CPT

## 2022-03-25 PROCEDURE — 36415 COLL VENOUS BLD VENIPUNCTURE: CPT | Performed by: EMERGENCY MEDICINE

## 2022-03-25 PROCEDURE — 81001 URINALYSIS AUTO W/SCOPE: CPT | Performed by: EMERGENCY MEDICINE

## 2022-03-25 PROCEDURE — 85610 PROTHROMBIN TIME: CPT | Performed by: FAMILY MEDICINE

## 2022-03-25 PROCEDURE — 83880 ASSAY OF NATRIURETIC PEPTIDE: CPT | Performed by: EMERGENCY MEDICINE

## 2022-03-25 PROCEDURE — 83605 ASSAY OF LACTIC ACID: CPT | Performed by: EMERGENCY MEDICINE

## 2022-03-25 PROCEDURE — 36415 COLL VENOUS BLD VENIPUNCTURE: CPT

## 2022-03-25 PROCEDURE — 85025 COMPLETE CBC W/AUTO DIFF WBC: CPT | Performed by: EMERGENCY MEDICINE

## 2022-03-25 PROCEDURE — 25010000002 FUROSEMIDE PER 20 MG: Performed by: FAMILY MEDICINE

## 2022-03-25 PROCEDURE — 83735 ASSAY OF MAGNESIUM: CPT | Performed by: FAMILY MEDICINE

## 2022-03-25 PROCEDURE — 84484 ASSAY OF TROPONIN QUANT: CPT | Performed by: EMERGENCY MEDICINE

## 2022-03-25 PROCEDURE — 87636 SARSCOV2 & INF A&B AMP PRB: CPT | Performed by: EMERGENCY MEDICINE

## 2022-03-25 RX ORDER — SODIUM CHLORIDE 0.9 % (FLUSH) 0.9 %
10 SYRINGE (ML) INJECTION AS NEEDED
Status: DISCONTINUED | OUTPATIENT
Start: 2022-03-25 | End: 2022-03-28 | Stop reason: HOSPADM

## 2022-03-25 RX ORDER — LEVOTHYROXINE SODIUM 0.1 MG/1
100 TABLET ORAL DAILY
Status: DISCONTINUED | OUTPATIENT
Start: 2022-03-25 | End: 2022-03-26 | Stop reason: SDUPTHER

## 2022-03-25 RX ORDER — TRAZODONE HYDROCHLORIDE 50 MG/1
50 TABLET ORAL NIGHTLY
Status: DISCONTINUED | OUTPATIENT
Start: 2022-03-25 | End: 2022-03-28 | Stop reason: HOSPADM

## 2022-03-25 RX ORDER — ISOSORBIDE MONONITRATE 30 MG/1
30 TABLET, EXTENDED RELEASE ORAL DAILY
Status: DISCONTINUED | OUTPATIENT
Start: 2022-03-25 | End: 2022-03-28 | Stop reason: HOSPADM

## 2022-03-25 RX ORDER — SODIUM CHLORIDE 0.9 % (FLUSH) 0.9 %
10 SYRINGE (ML) INJECTION EVERY 12 HOURS SCHEDULED
Status: DISCONTINUED | OUTPATIENT
Start: 2022-03-25 | End: 2022-03-28 | Stop reason: HOSPADM

## 2022-03-25 RX ORDER — FUROSEMIDE 10 MG/ML
40 INJECTION INTRAMUSCULAR; INTRAVENOUS EVERY 12 HOURS
Status: DISCONTINUED | OUTPATIENT
Start: 2022-03-25 | End: 2022-03-28 | Stop reason: HOSPADM

## 2022-03-25 RX ORDER — FUROSEMIDE 10 MG/ML
80 INJECTION INTRAMUSCULAR; INTRAVENOUS ONCE
Status: COMPLETED | OUTPATIENT
Start: 2022-03-25 | End: 2022-03-25

## 2022-03-25 RX ORDER — METOPROLOL SUCCINATE 50 MG/1
50 TABLET, EXTENDED RELEASE ORAL 2 TIMES DAILY
Status: DISCONTINUED | OUTPATIENT
Start: 2022-03-25 | End: 2022-03-28 | Stop reason: HOSPADM

## 2022-03-25 RX ORDER — WARFARIN SODIUM 1 MG/1
1 TABLET ORAL
Status: DISCONTINUED | OUTPATIENT
Start: 2022-03-25 | End: 2022-03-26

## 2022-03-25 RX ORDER — ALBUTEROL SULFATE 2.5 MG/3ML
2.5 SOLUTION RESPIRATORY (INHALATION) EVERY 6 HOURS PRN
Status: DISCONTINUED | OUTPATIENT
Start: 2022-03-25 | End: 2022-03-28 | Stop reason: HOSPADM

## 2022-03-25 RX ORDER — PRAVASTATIN SODIUM 20 MG
40 TABLET ORAL DAILY
Status: DISCONTINUED | OUTPATIENT
Start: 2022-03-25 | End: 2022-03-28 | Stop reason: HOSPADM

## 2022-03-25 RX ADMIN — TRAZODONE HYDROCHLORIDE 50 MG: 50 TABLET ORAL at 19:54

## 2022-03-25 RX ADMIN — METOPROLOL SUCCINATE 50 MG: 50 TABLET, EXTENDED RELEASE ORAL at 19:54

## 2022-03-25 RX ADMIN — FUROSEMIDE 40 MG: 10 INJECTION, SOLUTION INTRAMUSCULAR; INTRAVENOUS at 19:54

## 2022-03-25 RX ADMIN — AZITHROMYCIN DIHYDRATE 500 MG: 500 INJECTION, POWDER, LYOPHILIZED, FOR SOLUTION INTRAVENOUS at 11:31

## 2022-03-25 RX ADMIN — PRAVASTATIN SODIUM 40 MG: 20 TABLET ORAL at 19:54

## 2022-03-25 RX ADMIN — NITROGLYCERIN 1 INCH: 20 OINTMENT TOPICAL at 10:05

## 2022-03-25 RX ADMIN — Medication 10 ML: at 19:55

## 2022-03-25 RX ADMIN — WARFARIN SODIUM 1 MG: 1 TABLET ORAL at 19:54

## 2022-03-25 RX ADMIN — CEFTRIAXONE 1 G: 10 INJECTION, POWDER, FOR SOLUTION INTRAVENOUS at 10:42

## 2022-03-25 RX ADMIN — FUROSEMIDE 80 MG: 10 INJECTION, SOLUTION INTRAMUSCULAR; INTRAVENOUS at 09:42

## 2022-03-26 ENCOUNTER — APPOINTMENT (OUTPATIENT)
Dept: CARDIOLOGY | Facility: HOSPITAL | Age: 82
End: 2022-03-26

## 2022-03-26 LAB
ANION GAP SERPL CALCULATED.3IONS-SCNC: 8 MMOL/L (ref 5–15)
BASOPHILS # BLD AUTO: 0.04 10*3/MM3 (ref 0–0.2)
BASOPHILS NFR BLD AUTO: 0.5 % (ref 0–1.5)
BH CV ECHO MEAS - ACS: 2 CM
BH CV ECHO MEAS - AO MAX PG (FULL): 2.9 MMHG
BH CV ECHO MEAS - AO MAX PG: 4.2 MMHG
BH CV ECHO MEAS - AO MEAN PG (FULL): 1 MMHG
BH CV ECHO MEAS - AO MEAN PG: 2 MMHG
BH CV ECHO MEAS - AO ROOT AREA (BSA CORRECTED): 1.7
BH CV ECHO MEAS - AO ROOT AREA: 8.6 CM^2
BH CV ECHO MEAS - AO ROOT DIAM: 3.3 CM
BH CV ECHO MEAS - AO V2 MAX: 103 CM/SEC
BH CV ECHO MEAS - AO V2 MEAN: 74.4 CM/SEC
BH CV ECHO MEAS - AO V2 VTI: 18.8 CM
BH CV ECHO MEAS - ASC AORTA: 3.2 CM
BH CV ECHO MEAS - AVA(I,A): 1.8 CM^2
BH CV ECHO MEAS - AVA(I,D): 1.8 CM^2
BH CV ECHO MEAS - AVA(V,A): 1.6 CM^2
BH CV ECHO MEAS - AVA(V,D): 1.6 CM^2
BH CV ECHO MEAS - BSA(HAYCOCK): 2 M^2
BH CV ECHO MEAS - BSA: 1.9 M^2
BH CV ECHO MEAS - BZI_BMI: 26.5 KILOGRAMS/M^2
BH CV ECHO MEAS - BZI_METRIC_HEIGHT: 172.7 CM
BH CV ECHO MEAS - BZI_METRIC_WEIGHT: 78.9 KG
BH CV ECHO MEAS - EDV(CUBED): 98 ML
BH CV ECHO MEAS - EDV(MOD-SP2): 57.3 ML
BH CV ECHO MEAS - EDV(MOD-SP4): 68.9 ML
BH CV ECHO MEAS - EDV(TEICH): 97.8 ML
BH CV ECHO MEAS - EF(CUBED): 81.6 %
BH CV ECHO MEAS - EF(MOD-SP2): 63.5 %
BH CV ECHO MEAS - EF(MOD-SP4): 69.5 %
BH CV ECHO MEAS - EF(TEICH): 74.4 %
BH CV ECHO MEAS - ESV(CUBED): 18 ML
BH CV ECHO MEAS - ESV(MOD-SP2): 20.9 ML
BH CV ECHO MEAS - ESV(MOD-SP4): 21 ML
BH CV ECHO MEAS - ESV(TEICH): 25.1 ML
BH CV ECHO MEAS - FS: 43.2 %
BH CV ECHO MEAS - IVS/LVPW: 0.99
BH CV ECHO MEAS - IVSD: 1.1 CM
BH CV ECHO MEAS - LA DIMENSION: 5.4 CM
BH CV ECHO MEAS - LA/AO: 1.6
BH CV ECHO MEAS - LV DIASTOLIC VOL/BSA (35-75): 35.8 ML/M^2
BH CV ECHO MEAS - LV MASS(C)D: 180.7 GRAMS
BH CV ECHO MEAS - LV MASS(C)DI: 93.8 GRAMS/M^2
BH CV ECHO MEAS - LV MAX PG: 1.4 MMHG
BH CV ECHO MEAS - LV MEAN PG: 1 MMHG
BH CV ECHO MEAS - LV SYSTOLIC VOL/BSA (12-30): 10.9 ML/M^2
BH CV ECHO MEAS - LV V1 MAX: 58.3 CM/SEC
BH CV ECHO MEAS - LV V1 MEAN: 40.1 CM/SEC
BH CV ECHO MEAS - LV V1 VTI: 11.8 CM
BH CV ECHO MEAS - LVIDD: 4.6 CM
BH CV ECHO MEAS - LVIDS: 2.6 CM
BH CV ECHO MEAS - LVLD AP2: 6.8 CM
BH CV ECHO MEAS - LVLD AP4: 6.5 CM
BH CV ECHO MEAS - LVLS AP2: 6.2 CM
BH CV ECHO MEAS - LVLS AP4: 5.8 CM
BH CV ECHO MEAS - LVOT AREA (M): 2.8 CM^2
BH CV ECHO MEAS - LVOT AREA: 2.8 CM^2
BH CV ECHO MEAS - LVOT DIAM: 1.9 CM
BH CV ECHO MEAS - LVPWD: 1.1 CM
BH CV ECHO MEAS - MR MAX PG: 73.6 MMHG
BH CV ECHO MEAS - MR MAX VEL: 429 CM/SEC
BH CV ECHO MEAS - MV DEC SLOPE: 1153 CM/SEC^2
BH CV ECHO MEAS - MV MAX PG: 12.3 MMHG
BH CV ECHO MEAS - MV MEAN PG: 5 MMHG
BH CV ECHO MEAS - MV P1/2T MAX VEL: 173 CM/SEC
BH CV ECHO MEAS - MV P1/2T: 43.9 MSEC
BH CV ECHO MEAS - MV V2 MAX: 175 CM/SEC
BH CV ECHO MEAS - MV V2 MEAN: 103 CM/SEC
BH CV ECHO MEAS - MV V2 VTI: 31.8 CM
BH CV ECHO MEAS - MVA P1/2T LCG: 1.3 CM^2
BH CV ECHO MEAS - MVA(P1/2T): 5 CM^2
BH CV ECHO MEAS - MVA(VTI): 1.1 CM^2
BH CV ECHO MEAS - PA MAX PG: 1.8 MMHG
BH CV ECHO MEAS - PA V2 MAX: 67.8 CM/SEC
BH CV ECHO MEAS - RAP SYSTOLE: 5 MMHG
BH CV ECHO MEAS - RVDD: 2.2 CM
BH CV ECHO MEAS - RVSP: 37.7 MMHG
BH CV ECHO MEAS - SI(AO): 83.5 ML/M^2
BH CV ECHO MEAS - SI(CUBED): 41.5 ML/M^2
BH CV ECHO MEAS - SI(LVOT): 17.4 ML/M^2
BH CV ECHO MEAS - SI(MOD-SP2): 18.9 ML/M^2
BH CV ECHO MEAS - SI(MOD-SP4): 24.9 ML/M^2
BH CV ECHO MEAS - SI(TEICH): 37.8 ML/M^2
BH CV ECHO MEAS - SV(AO): 160.8 ML
BH CV ECHO MEAS - SV(CUBED): 80 ML
BH CV ECHO MEAS - SV(LVOT): 33.5 ML
BH CV ECHO MEAS - SV(MOD-SP2): 36.4 ML
BH CV ECHO MEAS - SV(MOD-SP4): 47.9 ML
BH CV ECHO MEAS - SV(TEICH): 72.8 ML
BH CV ECHO MEAS - TR MAX VEL: 286 CM/SEC
BUN SERPL-MCNC: 12 MG/DL (ref 8–23)
BUN/CREAT SERPL: 16.2 (ref 7–25)
CALCIUM SPEC-SCNC: 9.3 MG/DL (ref 8.6–10.5)
CHLORIDE SERPL-SCNC: 101 MMOL/L (ref 98–107)
CO2 SERPL-SCNC: 36 MMOL/L (ref 22–29)
CREAT SERPL-MCNC: 0.74 MG/DL (ref 0.57–1)
DEPRECATED RDW RBC AUTO: 48.2 FL (ref 37–54)
EGFRCR SERPLBLD CKD-EPI 2021: 79.9 ML/MIN/1.73
EOSINOPHIL # BLD AUTO: 0.21 10*3/MM3 (ref 0–0.4)
EOSINOPHIL NFR BLD AUTO: 2.5 % (ref 0.3–6.2)
ERYTHROCYTE [DISTWIDTH] IN BLOOD BY AUTOMATED COUNT: 14.6 % (ref 12.3–15.4)
GLUCOSE BLDC GLUCOMTR-MCNC: 133 MG/DL (ref 70–130)
GLUCOSE BLDC GLUCOMTR-MCNC: 137 MG/DL (ref 70–130)
GLUCOSE BLDC GLUCOMTR-MCNC: 158 MG/DL (ref 70–130)
GLUCOSE BLDC GLUCOMTR-MCNC: 242 MG/DL (ref 70–130)
GLUCOSE SERPL-MCNC: 131 MG/DL (ref 65–99)
HCT VFR BLD AUTO: 43.4 % (ref 34–46.6)
HGB BLD-MCNC: 13.8 G/DL (ref 12–15.9)
IMM GRANULOCYTES # BLD AUTO: 0.04 10*3/MM3 (ref 0–0.05)
IMM GRANULOCYTES NFR BLD AUTO: 0.5 % (ref 0–0.5)
INR PPP: 2.28 (ref 0.8–1.2)
LYMPHOCYTES # BLD AUTO: 1.79 10*3/MM3 (ref 0.7–3.1)
LYMPHOCYTES NFR BLD AUTO: 21.2 % (ref 19.6–45.3)
MAGNESIUM SERPL-MCNC: 1.9 MG/DL (ref 1.6–2.4)
MAXIMAL PREDICTED HEART RATE: 136 BPM
MCH RBC QN AUTO: 28.9 PG (ref 26.6–33)
MCHC RBC AUTO-ENTMCNC: 31.8 G/DL (ref 31.5–35.7)
MCV RBC AUTO: 90.8 FL (ref 79–97)
MONOCYTES # BLD AUTO: 1.08 10*3/MM3 (ref 0.1–0.9)
MONOCYTES NFR BLD AUTO: 12.8 % (ref 5–12)
NEUTROPHILS NFR BLD AUTO: 5.29 10*3/MM3 (ref 1.7–7)
NEUTROPHILS NFR BLD AUTO: 62.5 % (ref 42.7–76)
NRBC BLD AUTO-RTO: 0 /100 WBC (ref 0–0.2)
NT-PROBNP SERPL-MCNC: 687.1 PG/ML (ref 0–1800)
PLATELET # BLD AUTO: 215 10*3/MM3 (ref 140–450)
PMV BLD AUTO: 11 FL (ref 6–12)
POTASSIUM SERPL-SCNC: 4 MMOL/L (ref 3.5–5.2)
PROCALCITONIN SERPL-MCNC: 0.05 NG/ML (ref 0–0.25)
PROTHROMBIN TIME: 24.6 SECONDS (ref 11.1–15.3)
RBC # BLD AUTO: 4.78 10*6/MM3 (ref 3.77–5.28)
SODIUM SERPL-SCNC: 145 MMOL/L (ref 136–145)
STRESS TARGET HR: 116 BPM
WBC NRBC COR # BLD: 8.45 10*3/MM3 (ref 3.4–10.8)

## 2022-03-26 PROCEDURE — 85025 COMPLETE CBC W/AUTO DIFF WBC: CPT | Performed by: FAMILY MEDICINE

## 2022-03-26 PROCEDURE — 80048 BASIC METABOLIC PNL TOTAL CA: CPT | Performed by: FAMILY MEDICINE

## 2022-03-26 PROCEDURE — 25010000002 CEFTRIAXONE PER 250 MG: Performed by: FAMILY MEDICINE

## 2022-03-26 PROCEDURE — 25010000002 FUROSEMIDE PER 20 MG: Performed by: FAMILY MEDICINE

## 2022-03-26 PROCEDURE — 63710000001 INSULIN ASPART PER 5 UNITS: Performed by: NURSE PRACTITIONER

## 2022-03-26 PROCEDURE — 84145 PROCALCITONIN (PCT): CPT | Performed by: NURSE PRACTITIONER

## 2022-03-26 PROCEDURE — 83880 ASSAY OF NATRIURETIC PEPTIDE: CPT | Performed by: NURSE PRACTITIONER

## 2022-03-26 PROCEDURE — 85610 PROTHROMBIN TIME: CPT | Performed by: FAMILY MEDICINE

## 2022-03-26 PROCEDURE — 83735 ASSAY OF MAGNESIUM: CPT | Performed by: FAMILY MEDICINE

## 2022-03-26 PROCEDURE — 93306 TTE W/DOPPLER COMPLETE: CPT

## 2022-03-26 PROCEDURE — 25010000002 AZITHROMYCIN PER 500 MG: Performed by: FAMILY MEDICINE

## 2022-03-26 PROCEDURE — 82962 GLUCOSE BLOOD TEST: CPT

## 2022-03-26 RX ORDER — WARFARIN SODIUM 2.5 MG/1
2.5 TABLET ORAL
Status: DISCONTINUED | OUTPATIENT
Start: 2022-03-26 | End: 2022-03-28 | Stop reason: HOSPADM

## 2022-03-26 RX ORDER — NICOTINE POLACRILEX 4 MG
15 LOZENGE BUCCAL
Status: DISCONTINUED | OUTPATIENT
Start: 2022-03-26 | End: 2022-03-28 | Stop reason: HOSPADM

## 2022-03-26 RX ORDER — DEXTROSE MONOHYDRATE 25 G/50ML
25 INJECTION, SOLUTION INTRAVENOUS
Status: DISCONTINUED | OUTPATIENT
Start: 2022-03-26 | End: 2022-03-28 | Stop reason: HOSPADM

## 2022-03-26 RX ORDER — LEVOTHYROXINE SODIUM 0.1 MG/1
100 TABLET ORAL
Status: DISCONTINUED | OUTPATIENT
Start: 2022-03-27 | End: 2022-03-28 | Stop reason: HOSPADM

## 2022-03-26 RX ADMIN — METOPROLOL SUCCINATE 50 MG: 50 TABLET, EXTENDED RELEASE ORAL at 08:50

## 2022-03-26 RX ADMIN — METOPROLOL SUCCINATE 50 MG: 50 TABLET, EXTENDED RELEASE ORAL at 20:21

## 2022-03-26 RX ADMIN — METFORMIN HYDROCHLORIDE 500 MG: 500 TABLET ORAL at 08:50

## 2022-03-26 RX ADMIN — TRAZODONE HYDROCHLORIDE 50 MG: 50 TABLET ORAL at 20:21

## 2022-03-26 RX ADMIN — LEVOTHYROXINE SODIUM 100 MCG: 100 TABLET ORAL at 11:12

## 2022-03-26 RX ADMIN — WARFARIN SODIUM 2.5 MG: 2.5 TABLET ORAL at 17:30

## 2022-03-26 RX ADMIN — PRAVASTATIN SODIUM 40 MG: 20 TABLET ORAL at 08:50

## 2022-03-26 RX ADMIN — CEFTRIAXONE SODIUM 1 G: 10 INJECTION, POWDER, FOR SOLUTION INTRAVENOUS at 11:08

## 2022-03-26 RX ADMIN — FUROSEMIDE 40 MG: 10 INJECTION, SOLUTION INTRAMUSCULAR; INTRAVENOUS at 17:30

## 2022-03-26 RX ADMIN — Medication 10 ML: at 20:21

## 2022-03-26 RX ADMIN — INSULIN ASPART 4 UNITS: 100 INJECTION, SOLUTION INTRAVENOUS; SUBCUTANEOUS at 17:33

## 2022-03-26 RX ADMIN — AZITHROMYCIN DIHYDRATE 500 MG: 500 INJECTION, POWDER, LYOPHILIZED, FOR SOLUTION INTRAVENOUS at 11:09

## 2022-03-26 RX ADMIN — FUROSEMIDE 40 MG: 10 INJECTION, SOLUTION INTRAMUSCULAR; INTRAVENOUS at 05:21

## 2022-03-26 RX ADMIN — ISOSORBIDE MONONITRATE 30 MG: 30 TABLET, EXTENDED RELEASE ORAL at 08:50

## 2022-03-27 ENCOUNTER — APPOINTMENT (OUTPATIENT)
Dept: CT IMAGING | Facility: HOSPITAL | Age: 82
End: 2022-03-27

## 2022-03-27 LAB
ANION GAP SERPL CALCULATED.3IONS-SCNC: 11 MMOL/L (ref 5–15)
BASOPHILS # BLD AUTO: 0.05 10*3/MM3 (ref 0–0.2)
BASOPHILS NFR BLD AUTO: 0.5 % (ref 0–1.5)
BUN SERPL-MCNC: 14 MG/DL (ref 8–23)
BUN/CREAT SERPL: 22.2 (ref 7–25)
CALCIUM SPEC-SCNC: 9.4 MG/DL (ref 8.6–10.5)
CHLORIDE SERPL-SCNC: 99 MMOL/L (ref 98–107)
CO2 SERPL-SCNC: 31 MMOL/L (ref 22–29)
CREAT SERPL-MCNC: 0.63 MG/DL (ref 0.57–1)
DEPRECATED RDW RBC AUTO: 45 FL (ref 37–54)
EGFRCR SERPLBLD CKD-EPI 2021: 87.6 ML/MIN/1.73
EOSINOPHIL # BLD AUTO: 0.27 10*3/MM3 (ref 0–0.4)
EOSINOPHIL NFR BLD AUTO: 2.9 % (ref 0.3–6.2)
ERYTHROCYTE [DISTWIDTH] IN BLOOD BY AUTOMATED COUNT: 14.1 % (ref 12.3–15.4)
GLUCOSE BLDC GLUCOMTR-MCNC: 131 MG/DL (ref 70–130)
GLUCOSE BLDC GLUCOMTR-MCNC: 158 MG/DL (ref 70–130)
GLUCOSE BLDC GLUCOMTR-MCNC: 236 MG/DL (ref 70–130)
GLUCOSE BLDC GLUCOMTR-MCNC: 93 MG/DL (ref 70–130)
GLUCOSE SERPL-MCNC: 135 MG/DL (ref 65–99)
HCT VFR BLD AUTO: 42.2 % (ref 34–46.6)
HGB BLD-MCNC: 13.8 G/DL (ref 12–15.9)
IMM GRANULOCYTES # BLD AUTO: 0.05 10*3/MM3 (ref 0–0.05)
IMM GRANULOCYTES NFR BLD AUTO: 0.5 % (ref 0–0.5)
INR PPP: 2.36 (ref 0.8–1.2)
L PNEUMO1 AG UR QL IA: NEGATIVE
LYMPHOCYTES # BLD AUTO: 1.81 10*3/MM3 (ref 0.7–3.1)
LYMPHOCYTES NFR BLD AUTO: 19.6 % (ref 19.6–45.3)
MAGNESIUM SERPL-MCNC: 2 MG/DL (ref 1.6–2.4)
MCH RBC QN AUTO: 28.8 PG (ref 26.6–33)
MCHC RBC AUTO-ENTMCNC: 32.7 G/DL (ref 31.5–35.7)
MCV RBC AUTO: 88.1 FL (ref 79–97)
MONOCYTES # BLD AUTO: 1.06 10*3/MM3 (ref 0.1–0.9)
MONOCYTES NFR BLD AUTO: 11.5 % (ref 5–12)
NEUTROPHILS NFR BLD AUTO: 6 10*3/MM3 (ref 1.7–7)
NEUTROPHILS NFR BLD AUTO: 65 % (ref 42.7–76)
NRBC BLD AUTO-RTO: 0 /100 WBC (ref 0–0.2)
PLATELET # BLD AUTO: 256 10*3/MM3 (ref 140–450)
PMV BLD AUTO: 10.6 FL (ref 6–12)
POTASSIUM SERPL-SCNC: 3.9 MMOL/L (ref 3.5–5.2)
PROTHROMBIN TIME: 25.2 SECONDS (ref 11.1–15.3)
RBC # BLD AUTO: 4.79 10*6/MM3 (ref 3.77–5.28)
S PNEUM AG SPEC QL LA: NEGATIVE
SODIUM SERPL-SCNC: 141 MMOL/L (ref 136–145)
WBC NRBC COR # BLD: 9.24 10*3/MM3 (ref 3.4–10.8)

## 2022-03-27 PROCEDURE — 87899 AGENT NOS ASSAY W/OPTIC: CPT | Performed by: NURSE PRACTITIONER

## 2022-03-27 PROCEDURE — 80048 BASIC METABOLIC PNL TOTAL CA: CPT | Performed by: FAMILY MEDICINE

## 2022-03-27 PROCEDURE — 63710000001 INSULIN ASPART PER 5 UNITS: Performed by: NURSE PRACTITIONER

## 2022-03-27 PROCEDURE — 85025 COMPLETE CBC W/AUTO DIFF WBC: CPT | Performed by: FAMILY MEDICINE

## 2022-03-27 PROCEDURE — 25010000002 FUROSEMIDE PER 20 MG: Performed by: FAMILY MEDICINE

## 2022-03-27 PROCEDURE — 83735 ASSAY OF MAGNESIUM: CPT | Performed by: FAMILY MEDICINE

## 2022-03-27 PROCEDURE — 36415 COLL VENOUS BLD VENIPUNCTURE: CPT | Performed by: FAMILY MEDICINE

## 2022-03-27 PROCEDURE — 85610 PROTHROMBIN TIME: CPT | Performed by: FAMILY MEDICINE

## 2022-03-27 PROCEDURE — 25010000002 AZITHROMYCIN PER 500 MG: Performed by: FAMILY MEDICINE

## 2022-03-27 PROCEDURE — 82962 GLUCOSE BLOOD TEST: CPT

## 2022-03-27 PROCEDURE — 71250 CT THORAX DX C-: CPT

## 2022-03-27 PROCEDURE — 25010000002 CEFTRIAXONE PER 250 MG: Performed by: FAMILY MEDICINE

## 2022-03-27 RX ADMIN — AZITHROMYCIN DIHYDRATE 500 MG: 500 INJECTION, POWDER, LYOPHILIZED, FOR SOLUTION INTRAVENOUS at 09:01

## 2022-03-27 RX ADMIN — FUROSEMIDE 40 MG: 10 INJECTION, SOLUTION INTRAMUSCULAR; INTRAVENOUS at 05:29

## 2022-03-27 RX ADMIN — TRAZODONE HYDROCHLORIDE 50 MG: 50 TABLET ORAL at 20:25

## 2022-03-27 RX ADMIN — CEFTRIAXONE SODIUM 1 G: 10 INJECTION, POWDER, FOR SOLUTION INTRAVENOUS at 09:02

## 2022-03-27 RX ADMIN — METOPROLOL SUCCINATE 50 MG: 50 TABLET, EXTENDED RELEASE ORAL at 20:25

## 2022-03-27 RX ADMIN — PRAVASTATIN SODIUM 40 MG: 20 TABLET ORAL at 08:52

## 2022-03-27 RX ADMIN — METOPROLOL SUCCINATE 50 MG: 50 TABLET, EXTENDED RELEASE ORAL at 08:52

## 2022-03-27 RX ADMIN — LEVOTHYROXINE SODIUM 100 MCG: 100 TABLET ORAL at 05:26

## 2022-03-27 RX ADMIN — INSULIN ASPART 2 UNITS: 100 INJECTION, SOLUTION INTRAVENOUS; SUBCUTANEOUS at 12:54

## 2022-03-27 RX ADMIN — FUROSEMIDE 40 MG: 10 INJECTION, SOLUTION INTRAMUSCULAR; INTRAVENOUS at 17:23

## 2022-03-27 RX ADMIN — WARFARIN SODIUM 2.5 MG: 2.5 TABLET ORAL at 18:20

## 2022-03-27 RX ADMIN — Medication 10 ML: at 20:25

## 2022-03-27 RX ADMIN — ISOSORBIDE MONONITRATE 30 MG: 30 TABLET, EXTENDED RELEASE ORAL at 08:52

## 2022-03-28 ENCOUNTER — HOME HEALTH ADMISSION (OUTPATIENT)
Dept: HOME HEALTH SERVICES | Facility: HOME HEALTHCARE | Age: 82
End: 2022-03-28

## 2022-03-28 ENCOUNTER — READMISSION MANAGEMENT (OUTPATIENT)
Dept: CALL CENTER | Facility: HOSPITAL | Age: 82
End: 2022-03-28

## 2022-03-28 VITALS
HEART RATE: 78 BPM | HEIGHT: 68 IN | WEIGHT: 175.7 LBS | SYSTOLIC BLOOD PRESSURE: 119 MMHG | BODY MASS INDEX: 26.63 KG/M2 | OXYGEN SATURATION: 94 % | TEMPERATURE: 95.6 F | RESPIRATION RATE: 16 BRPM | DIASTOLIC BLOOD PRESSURE: 63 MMHG

## 2022-03-28 LAB
GLUCOSE BLDC GLUCOMTR-MCNC: 125 MG/DL (ref 70–130)
GLUCOSE BLDC GLUCOMTR-MCNC: 153 MG/DL (ref 70–130)
HOLD SPECIMEN: NORMAL
INR PPP: 2.07 (ref 0.8–1.2)
PROTHROMBIN TIME: 22.8 SECONDS (ref 11.1–15.3)
WHOLE BLOOD HOLD SPECIMEN: NORMAL

## 2022-03-28 PROCEDURE — 25010000002 FUROSEMIDE PER 20 MG: Performed by: FAMILY MEDICINE

## 2022-03-28 PROCEDURE — 63710000001 INSULIN ASPART PER 5 UNITS: Performed by: NURSE PRACTITIONER

## 2022-03-28 PROCEDURE — 94799 UNLISTED PULMONARY SVC/PX: CPT

## 2022-03-28 PROCEDURE — 85610 PROTHROMBIN TIME: CPT | Performed by: FAMILY MEDICINE

## 2022-03-28 PROCEDURE — 87070 CULTURE OTHR SPECIMN AEROBIC: CPT | Performed by: FAMILY MEDICINE

## 2022-03-28 PROCEDURE — 25010000002 AZITHROMYCIN PER 500 MG: Performed by: FAMILY MEDICINE

## 2022-03-28 PROCEDURE — 82962 GLUCOSE BLOOD TEST: CPT

## 2022-03-28 PROCEDURE — 87205 SMEAR GRAM STAIN: CPT | Performed by: FAMILY MEDICINE

## 2022-03-28 PROCEDURE — 25010000002 CEFTRIAXONE PER 250 MG: Performed by: FAMILY MEDICINE

## 2022-03-28 RX ORDER — CEFDINIR 300 MG/1
300 CAPSULE ORAL 2 TIMES DAILY
Qty: 8 CAPSULE | Refills: 0 | Status: SHIPPED | OUTPATIENT
Start: 2022-03-28 | End: 2022-04-01

## 2022-03-28 RX ORDER — ALBUTEROL SULFATE 2.5 MG/3ML
2.5 SOLUTION RESPIRATORY (INHALATION) EVERY 4 HOURS PRN
Qty: 540 ML | Refills: 12 | Status: SHIPPED | OUTPATIENT
Start: 2022-03-28 | End: 2023-01-25

## 2022-03-28 RX ADMIN — INSULIN ASPART 2 UNITS: 100 INJECTION, SOLUTION INTRAVENOUS; SUBCUTANEOUS at 10:58

## 2022-03-28 RX ADMIN — AZITHROMYCIN DIHYDRATE 500 MG: 500 INJECTION, POWDER, LYOPHILIZED, FOR SOLUTION INTRAVENOUS at 10:58

## 2022-03-28 RX ADMIN — LEVOTHYROXINE SODIUM 100 MCG: 100 TABLET ORAL at 05:17

## 2022-03-28 RX ADMIN — FUROSEMIDE 40 MG: 10 INJECTION, SOLUTION INTRAMUSCULAR; INTRAVENOUS at 05:17

## 2022-03-28 RX ADMIN — METOPROLOL SUCCINATE 50 MG: 50 TABLET, EXTENDED RELEASE ORAL at 08:39

## 2022-03-28 RX ADMIN — Medication 10 ML: at 08:39

## 2022-03-28 RX ADMIN — ISOSORBIDE MONONITRATE 30 MG: 30 TABLET, EXTENDED RELEASE ORAL at 08:39

## 2022-03-28 RX ADMIN — PRAVASTATIN SODIUM 40 MG: 20 TABLET ORAL at 08:39

## 2022-03-28 RX ADMIN — CEFTRIAXONE SODIUM 1 G: 10 INJECTION, POWDER, FOR SOLUTION INTRAVENOUS at 10:58

## 2022-03-29 ENCOUNTER — HOME CARE VISIT (OUTPATIENT)
Dept: HOME HEALTH SERVICES | Facility: CLINIC | Age: 82
End: 2022-03-29

## 2022-03-29 ENCOUNTER — ANTICOAGULATION VISIT (OUTPATIENT)
Dept: CARDIAC SURGERY | Facility: CLINIC | Age: 82
End: 2022-03-29

## 2022-03-29 DIAGNOSIS — Z79.01 LONG TERM CURRENT USE OF ANTICOAGULANT THERAPY: Primary | ICD-10-CM

## 2022-03-29 DIAGNOSIS — Z51.81 ENCOUNTER FOR THERAPEUTIC DRUG MONITORING: ICD-10-CM

## 2022-03-29 DIAGNOSIS — I48.91 ATRIAL FIBRILLATION, UNSPECIFIED TYPE: ICD-10-CM

## 2022-03-29 LAB — INR PPP: 2.1

## 2022-03-29 PROCEDURE — G0299 HHS/HOSPICE OF RN EA 15 MIN: HCPCS

## 2022-03-29 PROCEDURE — 93793 ANTICOAG MGMT PT WARFARIN: CPT | Performed by: NURSE PRACTITIONER

## 2022-03-29 NOTE — OUTREACH NOTE
Prep Survey    Flowsheet Row Responses   [facility] patient discharged from? [facility]   Is LACE score < 7 ? No   Emergency Room discharge w/ pulse ox? No   Eligibility Readm Mgmt   Discharge diagnosis Acute respiratory failure with hypoxia   Does the patient have one of the following disease processes/diagnoses(primary or secondary)? COPD/Pneumonia   Does the patient have Home health ordered? Yes   What is the Home health agency?  [facility]   Is there a DME ordered? Yes   What DME was ordered? nebulizer and oxygen   Comments regarding appointments see AVS   Prep survey completed? Yes          DANYEL JARRELL - Registered Nurse

## 2022-03-29 NOTE — PROGRESS NOTES
I spoke to Nila Hilton, RN with [facility] who admitted pt to their services today.  Pt was discharged from the hospital yesterday. Pt will complete Cefdinir this Friday which is her only medication change.  I verified coumadin dose and instructed for pt to continue the same.  Instructed Nila to recheck INR on Tuesday, Apr 5th.  Instructions verbalized.  Patient instructed regarding medication; results given and questions answered. Nutritional counseling given.  Dietary factors affecting therapy addressed.  Patient instructed to monitor for excessive bruising or bleeding.  Findings reported by Baron Herrera RN.   Today's INR is Lab Results - Last 18 Months   Lab Units 03/29/22  0000   INR  2.10

## 2022-03-30 ENCOUNTER — HOME CARE VISIT (OUTPATIENT)
Dept: HOME HEALTH SERVICES | Facility: CLINIC | Age: 82
End: 2022-03-30

## 2022-03-30 VITALS
HEART RATE: 90 BPM | OXYGEN SATURATION: 98 % | SYSTOLIC BLOOD PRESSURE: 128 MMHG | DIASTOLIC BLOOD PRESSURE: 72 MMHG | TEMPERATURE: 98 F | RESPIRATION RATE: 22 BRPM

## 2022-03-30 VITALS
SYSTOLIC BLOOD PRESSURE: 108 MMHG | HEART RATE: 68 BPM | DIASTOLIC BLOOD PRESSURE: 58 MMHG | RESPIRATION RATE: 18 BRPM | OXYGEN SATURATION: 97 %

## 2022-03-30 LAB
BACTERIA SPEC AEROBE CULT: NORMAL
BACTERIA SPEC RESP CULT: NORMAL
GRAM STN SPEC: NORMAL

## 2022-03-30 PROCEDURE — G0151 HHCP-SERV OF PT,EA 15 MIN: HCPCS

## 2022-03-30 NOTE — HOME HEALTH
"PATIENT WAS HOSPITALIZED FROM 3/25/2022 - 3/28/2022 SECONDARY TO ATYPICAL PNEUMONIA. SHE WAS HAVING INCREASED SHORTNESS OVER A WHILE AND GOT TO THE POINT SHE REALLY COULDN'T BREATHE. SHE PRESENTED TO THE ER AND WAS FOUND TO HAVE THE PNEUMONIA. SHE IS     PMHX: AFIB, CAD, HLD, STENT PLACEMENT, DM, SSS, AND HLD.    PATIENT GOAL: \"GET RID OF HER OXYGEN AND GET BACK TO NORMAL\"    PRIOR LEVEL OF FUNCTION: PATIENT IS NORMALLY INDEPENDENT WITH ALL ACTIVITY AND LIVES INDEPENDENTLY, SHE NORMALLY STILL DRIVES"

## 2022-03-31 ENCOUNTER — HOME CARE VISIT (OUTPATIENT)
Dept: HOME HEALTH SERVICES | Facility: CLINIC | Age: 82
End: 2022-03-31

## 2022-03-31 ENCOUNTER — READMISSION MANAGEMENT (OUTPATIENT)
Dept: CALL CENTER | Facility: HOSPITAL | Age: 82
End: 2022-03-31

## 2022-03-31 VITALS
DIASTOLIC BLOOD PRESSURE: 70 MMHG | RESPIRATION RATE: 18 BRPM | HEART RATE: 82 BPM | OXYGEN SATURATION: 96 % | SYSTOLIC BLOOD PRESSURE: 122 MMHG | TEMPERATURE: 97.7 F

## 2022-03-31 VITALS
RESPIRATION RATE: 20 BRPM | DIASTOLIC BLOOD PRESSURE: 70 MMHG | TEMPERATURE: 97.5 F | SYSTOLIC BLOOD PRESSURE: 142 MMHG | HEART RATE: 80 BPM

## 2022-03-31 PROCEDURE — G0157 HHC PT ASSISTANT EA 15: HCPCS

## 2022-03-31 PROCEDURE — G0493 RN CARE EA 15 MIN HH/HOSPICE: HCPCS

## 2022-03-31 NOTE — OUTREACH NOTE
COPD/PN Week 1 Survey    Flowsheet Row Responses   [facility] patient discharged from? [place]   Does the patient have one of the following disease processes/diagnoses(primary or secondary)? COPD/Pneumonia   Was the primary reason for admission: Pneumonia   Week 1 attempt successful? Yes   Call start time 1124   Call end time 1126   Discharge diagnosis Acute respiratory failure with hypoxia   Meds reviewed with patient/caregiver? Yes   Is the patient having any side effects they believe may be caused by any medication additions or changes? No   Does the patient have all medications ordered at discharge? Yes   Is the patient taking all medications as directed (includes completed medication regime)? Yes   Does the patient have a primary care provider?  Yes   Does the patient have an appointment with their PCP or specialist within 7 days of discharge? Yes   Comments regarding PCP Dr Leija PCP has a followup with PCP on 4/4/2022   Has the patient kept scheduled appointments due by today? N/A   What DME was ordered? nebulizer and oxygen   Has all DME been delivered? Yes   DME comments 02 at 2 [facility]   Pulse Ox monitoring Intermittent   Pulse Ox device source Patient   O2 Sat comments Sat was 95 % yesterday when she checked it.    Psychosocial issues? No   Did the patient receive a copy of their discharge instructions? Yes   Nursing interventions Reviewed instructions with patient   What is the patient's perception of their health status since discharge? Improving   Nursing Interventions Nurse provided patient education   Is the patient/caregiver able to teach back the hierarchy of who to call/visit for symptoms/problems? PCP, Specialist, Home health nurse, Urgent Care, ED, 911 Yes   Is the patient/caregiver able to teach back signs and symptoms of worsening condition: Fever/chills, Shortness of breath, Chest pain   Is the patient/caregiver able to teach back importance of completing antibiotic course of  treatment? Yes   Week 1 call completed? Yes   Wrap up additional comments Patient report sshe is doing well and all needs are met.           JOSE A LIAO - Registered Nurse

## 2022-04-02 LAB
QT INTERVAL: 342 MS
QTC INTERVAL: 452 MS

## 2022-04-03 LAB
QT INTERVAL: 338 MS
QTC INTERVAL: 433 MS

## 2022-04-05 ENCOUNTER — ANTICOAGULATION VISIT (OUTPATIENT)
Dept: CARDIAC SURGERY | Facility: CLINIC | Age: 82
End: 2022-04-05

## 2022-04-05 ENCOUNTER — HOME CARE VISIT (OUTPATIENT)
Dept: HOME HEALTH SERVICES | Facility: CLINIC | Age: 82
End: 2022-04-05

## 2022-04-05 VITALS
RESPIRATION RATE: 20 BRPM | TEMPERATURE: 98.1 F | OXYGEN SATURATION: 95 % | DIASTOLIC BLOOD PRESSURE: 70 MMHG | SYSTOLIC BLOOD PRESSURE: 134 MMHG | HEART RATE: 100 BPM

## 2022-04-05 DIAGNOSIS — I48.91 ATRIAL FIBRILLATION, UNSPECIFIED TYPE: ICD-10-CM

## 2022-04-05 DIAGNOSIS — Z51.81 ENCOUNTER FOR THERAPEUTIC DRUG MONITORING: ICD-10-CM

## 2022-04-05 DIAGNOSIS — Z79.01 LONG TERM CURRENT USE OF ANTICOAGULANT THERAPY: Primary | ICD-10-CM

## 2022-04-05 LAB
HH POC INTERNATIONAL NORMALIZATION RATIO: 2.1
HH POC PROTIME: 25.3 SECONDS
INR PPP: 2.1

## 2022-04-05 PROCEDURE — G0300 HHS/HOSPICE OF LPN EA 15 MIN: HCPCS

## 2022-04-05 PROCEDURE — 93793 ANTICOAG MGMT PT WARFARIN: CPT | Performed by: NURSE PRACTITIONER

## 2022-04-05 PROCEDURE — G0157 HHC PT ASSISTANT EA 15: HCPCS

## 2022-04-05 NOTE — PROGRESS NOTES
I spoke to HIPOLITO Hoff with [facility]  who was still in the home with pt.  Pt has completed AB and is taking her usual coumadin dose.  Pt denies bleeding issues and maintenance medications have not changed.  Instructed Kavya to recheck INR on Tuesday, Apr 19th.  Instructions verbalized.  Findings reported by Baron Herrera RN.   Today's INR is Lab Results - Last 18 Months   Lab Units 04/05/22  0000   INR  2.10

## 2022-04-06 VITALS
SYSTOLIC BLOOD PRESSURE: 130 MMHG | RESPIRATION RATE: 20 BRPM | HEART RATE: 96 BPM | TEMPERATURE: 98 F | OXYGEN SATURATION: 95 % | DIASTOLIC BLOOD PRESSURE: 70 MMHG

## 2022-04-07 ENCOUNTER — HOME CARE VISIT (OUTPATIENT)
Dept: HOME HEALTH SERVICES | Facility: CLINIC | Age: 82
End: 2022-04-07

## 2022-04-07 VITALS
TEMPERATURE: 97.4 F | RESPIRATION RATE: 20 BRPM | OXYGEN SATURATION: 95 % | SYSTOLIC BLOOD PRESSURE: 118 MMHG | DIASTOLIC BLOOD PRESSURE: 80 MMHG

## 2022-04-07 PROCEDURE — G0157 HHC PT ASSISTANT EA 15: HCPCS

## 2022-04-07 PROCEDURE — G0493 RN CARE EA 15 MIN HH/HOSPICE: HCPCS

## 2022-04-08 ENCOUNTER — READMISSION MANAGEMENT (OUTPATIENT)
Dept: CALL CENTER | Facility: HOSPITAL | Age: 82
End: 2022-04-08

## 2022-04-08 NOTE — OUTREACH NOTE
COPD/PN Week 2 Survey    Flowsheet Row Responses   [facility] patient discharged from? [place]   Does the patient have one of the following disease processes/diagnoses(primary or secondary)? COPD/Pneumonia   Was the primary reason for admission: Pneumonia   Week 2 attempt successful? Yes   Call start time 1256   Call end time 1258   Discharge diagnosis Acute respiratory failure with hypoxia   Meds reviewed with patient/caregiver? Yes   Is the patient having any side effects they believe may be caused by any medication additions or changes? No   Does the patient have all medications ordered at discharge? Yes   Is the patient taking all medications as directed (includes completed medication regime)? Yes   Does the patient have a primary care provider?  Yes   Comments regarding PCP Dr Leija PCP has a followup with PCP on 4/4/2022   Has the patient kept scheduled appointments due by today? Yes   What is the Home health agency?  [facility]   Has home health visited the patient within 72 hours of discharge? Yes   What DME was ordered? nebulizer and oxygen   Has all DME been delivered? Yes   DME comments 02 at 2 [facility]   Pulse Ox monitoring Intermittent   Pulse Ox device source Patient   O2 Sat comments O2 sat 95% on 2L   O2 Sat: education provided Sat levels, Monitoring frequency, When to seek care   Psychosocial issues? No   Did the patient receive a copy of their discharge instructions? Yes   Nursing interventions Reviewed instructions with patient   What is the patient's perception of their health status since discharge? Improving   Nursing Interventions Nurse provided patient education   Are the patient's immunizations up to date?  Yes   Nursing interventions Educated on importance of maintaining up to date immunizations as advised by provider   If the patient is a current smoker, are they able to teach back resources for cessation? Not a smoker   Is the patient/caregiver able to teach back the  hierarchy of who to call/visit for symptoms/problems? PCP, Specialist, Home health nurse, Urgent Care, ED, 911 Yes   Is the patient/caregiver able to teach back signs and symptoms of worsening condition: Fever/chills, Shortness of breath, Chest pain   Is the patient/caregiver able to teach back importance of completing antibiotic course of treatment? Yes   Week 2 call completed? Yes          WILDA ELMORE - Registered Nurse

## 2022-04-11 ENCOUNTER — OFFICE VISIT (OUTPATIENT)
Dept: SLEEP MEDICINE | Facility: HOSPITAL | Age: 82
End: 2022-04-11

## 2022-04-11 VITALS
DIASTOLIC BLOOD PRESSURE: 80 MMHG | HEART RATE: 66 BPM | OXYGEN SATURATION: 94 % | BODY MASS INDEX: 26.57 KG/M2 | WEIGHT: 175.3 LBS | HEIGHT: 68 IN | SYSTOLIC BLOOD PRESSURE: 139 MMHG

## 2022-04-11 DIAGNOSIS — G47.34 NOCTURNAL HYPOXIA: Primary | ICD-10-CM

## 2022-04-11 DIAGNOSIS — F51.04 PSYCHOPHYSIOLOGICAL INSOMNIA: ICD-10-CM

## 2022-04-11 DIAGNOSIS — G47.31 COMPLEX SLEEP APNEA SYNDROME: ICD-10-CM

## 2022-04-11 DIAGNOSIS — Z78.9 DIFFICULTY WITH CPAP USE: ICD-10-CM

## 2022-04-11 PROCEDURE — 99214 OFFICE O/P EST MOD 30 MIN: CPT | Performed by: NURSE PRACTITIONER

## 2022-04-11 NOTE — PROGRESS NOTES
Sleep Clinic Follow Up    Date: 2022  Primary Care Provider: Donis Leija MD    Last office visit: 2021 (I reviewed this note)    CC: Follow up: Complex sleep apnea started on ASV, new machine follow-up       Interim History:  Since the last visit:    1) severe complex apnea -  Kirsten Rosales has not remained compliant with ASV. When she first got machine she put on her face most every night but cannot tolerate longer than 1 hr and sometimes does not even fall asleep with the machine on. She has tried multiple different FFM.She has tried nasal mask with chin strap but does not like either.    Since her last OV she was hospitalized for pneumonia. She went home on 2 LPM O2 nasal cannula during sleep and as needed during the day. Has been using since. She is able to tolerate oxygen and uses the entire night.   Reports she is sleeping better than she has in a while.     Still taking trazodone 50 mg nightly. Doing well with medicine. Denies adverse medicine effects. Does not need refills.     Her son-in-law is with her today in the office. He has been staying with her.      2) Patient denies RLS symptoms.       Sleep Testin. PSG on 2021, AHI of 30   2. CPAP titration on 2021, recommended 17/8 cm H2O breath rate of 12   3. Currently on ASV cm H2O    PAP Data:    Time frame: 2022-2022   Compliance: 61 %  Average use on days used: 0hrs 57 min  Percent of days with usage greater than or equal to 4 hours: 0%  PAP range: max IPAP 25, EPAP 10-15, ps 0-8 cm H2O, breath rate auto   Leak: 64.5 L/ minutes  Average AHI: 15.5 events/hr  Mask type: Nasal pillows  DME: [facility]     Bed time: 2300  Sleep latency: 30-60 minutes  Number of times awakens during the night: 1-2  Wake time: 0800  Estimated total sleep time at night: 5-6 hours  Caffeine intake: 1 cups of coffee, 2 cups of tea, and 0 sodas per day  Alcohol intake: 0 drinks per week  Nap time: not usually    Sleepiness with  Driving: denies, not currently driving      [place] - 3        PMHx, FH, SH reviewed and pertinent changes are: hospitalized for bilateral pneumonia, wearing 2 LPM O2 nasal cannula during sleep       REVIEW OF SYSTEMS:   Positive: SOA with exertion   Negative for chest pain, fever, chills, cough, N/V/D, abdominal pain.    Smoking:none       Exam:  Vitals:    04/11/22 1028   BP: 139/80   Pulse: 66   SpO2: 94%           04/11/22  1028   Weight: 79.5 kg (175 lb 4.8 oz)     Body mass index is 26.66 kg/m². Patient's Body mass index is 26.66 kg/m². indicating that she is overweight (BMI 25-29.9). Patient's (Body mass index is 26.66 kg/m².) indicates that they are overweight with health conditions that include obstructive sleep apnea . Weight is unchanged. BMI is is above average; BMI management plan is completed. We discussed portion control and increasing exercise. .      Gen:                No distress, conversant, pleasant, appears stated age, alert, oriented  Eyes:               Anicteric sclera, moist conjunctiva, no lid lag                           EOMI   Lungs:             normal effort, non-labored breathing                          Clear to auscultation bilaterally          CV:                  Normal rate, irregular rhythm                           no lower extremity edema                 Psych:             Appropriate affect  Neuro:             CN 2-12 appear intact    Past Medical History:   Diagnosis Date   • Atherosclerotic heart disease of native coronary artery with unspecified angina pectoris (HCC)    • Atrial fibrillation (HCC)    • Breath shortness    • CAD (coronary artery disease)    • Hyperlipidemia    • Long term (current) use of anticoagulants    • Sick sinus syndrome (HCC)    • Unspecified hypothyroidism        Current Outpatient Medications:   •  albuterol (PROVENTIL) (2.5 MG/3ML) 0.083% nebulizer solution, Take 2.5 mg by nebulization Every 4 (Four) Hours As Needed for Wheezing., Disp: 540 mL,  Rfl: 12  •  Calcium-Magnesium-Vitamin D (CALCIUM MAGNESIUM PO), Take 1 Piece by mouth Daily. With vitamin d and zinc, Disp: , Rfl:   •  Coenzyme Q10 (COQ10 PO), Take 10 mg by mouth Daily., Disp: , Rfl:   •  Cyanocobalamin (Vitamin B12) 1000 MCG tablet controlled-release, Take 1,000 mcg by mouth Daily., Disp: , Rfl:   •  furosemide (LASIX) 20 MG tablet, Take 20 mg by mouth 2 (Two) Times a Day., Disp: , Rfl:   •  isosorbide mononitrate (IMDUR) 30 MG 24 hr tablet, TAKE ONE TABLET BY MOUTH DAILY, Disp: 90 tablet, Rfl: 3  •  levothyroxine (SYNTHROID, LEVOTHROID) 112 MCG tablet, Take 100 mcg by mouth Daily., Disp: , Rfl:   •  metFORMIN (GLUCOPHAGE) 500 MG tablet, Take 500 mg by mouth 2 (Two) Times a Day., Disp: , Rfl:   •  metoprolol succinate XL (TOPROL-XL) 50 MG 24 hr tablet, TAKE ONE TABLET BY MOUTH TWICE A DAY, Disp: 180 tablet, Rfl: 2  •  multivitamin with minerals tablet tablet, Take 1 tablet by mouth Daily., Disp: , Rfl:   •  O2 (OXYGEN), Inhale 2 L/min Continuous., Disp: , Rfl:   •  Omega-3 Fatty Acids (OMEGA 3 PO), Take 1,800 mg by mouth Daily., Disp: , Rfl:   •  oxybutynin XL (DITROPAN-XL) 10 MG 24 hr tablet, TAKE ONE TABLET BY MOUTH DAILY, Disp: , Rfl:   •  pravastatin (PRAVACHOL) 40 MG tablet, Take 1 tablet by mouth Daily., Disp: 90 tablet, Rfl: 3  •  ramelteon (Rozerem) 8 MG tablet, Take 1 tablet by mouth Every Night., Disp: 30 tablet, Rfl: 0  •  traZODone (DESYREL) 50 MG tablet, Take 1-2 tabs nightly as needed for insomnia, Disp: 60 tablet, Rfl: 3  •  VITAMIN D, CHOLECALCIFEROL, PO, Take  by mouth., Disp: , Rfl:   •  warfarin (COUMADIN) 5 MG tablet, TAKE ONE TABLET BY MOUTH ONCE NIGHTLY OR AS DIRECTED BY THE COUMADIN CLINIC, Disp: 30 tablet, Rfl: 5      Assessment and Plan:    1. Complex sleep apnea  -  1. Not compliant with PAP therapy- discussed health impact of sleep apnea including risk of death. She verbalized understanding. Encouraged compliance. She states he does not want to continue to use PAP  machine. Continued non-compliance after pressure adjustments, mask fittings and changes, and machine switch from BIPAP S/T to ASV.   2. Patient would benefit from oxygen during sleep. Will check overnight oximetry on 2 LPM O2 nasal cannula to ensure setting sufficient. At this time continue 2 LPM O2 nasal cannula during sleep   3. Reach out to DME regarding machine   4. Drowsy driving tips- do not drive if feeling sleepy   5. Return to clinic in 1 month with compliance report unless change in symptoms in interim period  2.   Insomnia - sleep onset and or maintenance - Established, stable (1)    1.  Continue trazodone 50 mg nightly    2.  Good sleep hygiene         3.   Nocturnal hypoxia     I spent 30 minutes caring for Kirsten on this date of service. This time includes time spent by me in the following activities: preparing for the visit, reviewing tests, obtaining and/or reviewing a separately obtained history, performing a medically appropriate examination and/or evaluation, counseling and educating the patient/family/caregiver, ordering medications, tests, or procedures and documenting information in the medical record.           This document has been electronically signed by MICKIE Newell on April 11, 2022 10:35 CDT            CC: Donis Leija MD          No ref. provider found

## 2022-04-12 ENCOUNTER — HOME CARE VISIT (OUTPATIENT)
Dept: HOME HEALTH SERVICES | Facility: CLINIC | Age: 82
End: 2022-04-12

## 2022-04-12 VITALS
TEMPERATURE: 97.4 F | RESPIRATION RATE: 20 BRPM | DIASTOLIC BLOOD PRESSURE: 80 MMHG | HEART RATE: 98 BPM | OXYGEN SATURATION: 95 % | SYSTOLIC BLOOD PRESSURE: 120 MMHG

## 2022-04-12 PROCEDURE — G0157 HHC PT ASSISTANT EA 15: HCPCS

## 2022-04-14 ENCOUNTER — TELEPHONE (OUTPATIENT)
Dept: CARDIOLOGY | Facility: CLINIC | Age: 82
End: 2022-04-14

## 2022-04-14 ENCOUNTER — HOME CARE VISIT (OUTPATIENT)
Dept: HOME HEALTH SERVICES | Facility: CLINIC | Age: 82
End: 2022-04-14

## 2022-04-14 PROCEDURE — G0151 HHCP-SERV OF PT,EA 15 MIN: HCPCS

## 2022-04-15 ENCOUNTER — HOME CARE VISIT (OUTPATIENT)
Dept: HOME HEALTH SERVICES | Facility: CLINIC | Age: 82
End: 2022-04-15

## 2022-04-15 ENCOUNTER — DOCUMENTATION (OUTPATIENT)
Dept: SLEEP MEDICINE | Facility: HOSPITAL | Age: 82
End: 2022-04-15

## 2022-04-15 ENCOUNTER — TELEPHONE (OUTPATIENT)
Dept: CARDIOLOGY | Facility: CLINIC | Age: 82
End: 2022-04-15

## 2022-04-15 VITALS
OXYGEN SATURATION: 93 % | RESPIRATION RATE: 18 BRPM | SYSTOLIC BLOOD PRESSURE: 140 MMHG | HEART RATE: 75 BPM | DIASTOLIC BLOOD PRESSURE: 86 MMHG | TEMPERATURE: 96.8 F

## 2022-04-15 DIAGNOSIS — G47.34 NOCTURNAL HYPOXIA: Primary | ICD-10-CM

## 2022-04-15 PROCEDURE — G0493 RN CARE EA 15 MIN HH/HOSPICE: HCPCS

## 2022-04-15 NOTE — TELEPHONE ENCOUNTER
----- Message from Jayda Myers MD sent at 4/14/2022 11:32 AM CDT -----  Can you pls check with her if her breathing has improved after treating her PNA. If stil has symptoms then will see sooner than July      Patient states she thinks her breathing has improved some. She does not think she needs to be seen right now. The patient was instructed to contact the office should she have changes in her breathing.

## 2022-04-15 NOTE — PROGRESS NOTES
Overnight oximetry on 2 LPM O2 nasal cannula   Date of study: 04/13/2022    Start time: 1128 Pm  End time: 0754 AM  Test duration:  8 hrs 25 minutes     Time </= 88%: 00:05:14    Highest SpO2: 100%  Lowest SpO2: 82%  Average SpO2: 94%    Time consecutive </= 88%: 00:01:43  Total desaturations: 76  NIMCO: 9.2    Highest pulse: 98 BPM  Lowest pulse: 56 BPM   Average pulse: 70 BPM     Continue 2 LPM O2 delivered by nasal cannula during sleep. Follow-up as scheduled.

## 2022-04-18 VITALS
SYSTOLIC BLOOD PRESSURE: 122 MMHG | DIASTOLIC BLOOD PRESSURE: 66 MMHG | OXYGEN SATURATION: 97 % | RESPIRATION RATE: 16 BRPM | HEART RATE: 68 BPM

## 2022-04-18 NOTE — HOME HEALTH
PATIENT IS DOING VERY WELL. SHE STATES SHE IS NOT HAVING TO WEAR HER OXYGEN FULL TIME AT THIS TIME. SHE IS MONITORING AND USING WHEN SHE NEEDS TO. SHE FEELS LIKE SHE IS DOING WELL WITH THERAPY AND THAT SHE CAN DO EVERYTING SHE NEEDS TO ON HER OWN

## 2022-04-19 ENCOUNTER — ANTICOAGULATION VISIT (OUTPATIENT)
Dept: CARDIAC SURGERY | Facility: CLINIC | Age: 82
End: 2022-04-19

## 2022-04-19 ENCOUNTER — HOME CARE VISIT (OUTPATIENT)
Dept: HOME HEALTH SERVICES | Facility: CLINIC | Age: 82
End: 2022-04-19

## 2022-04-19 VITALS
OXYGEN SATURATION: 98 % | DIASTOLIC BLOOD PRESSURE: 88 MMHG | TEMPERATURE: 98.2 F | RESPIRATION RATE: 20 BRPM | SYSTOLIC BLOOD PRESSURE: 124 MMHG | HEART RATE: 68 BPM

## 2022-04-19 DIAGNOSIS — I48.91 ATRIAL FIBRILLATION, UNSPECIFIED TYPE: ICD-10-CM

## 2022-04-19 DIAGNOSIS — Z79.01 LONG TERM CURRENT USE OF ANTICOAGULANT THERAPY: Primary | ICD-10-CM

## 2022-04-19 DIAGNOSIS — Z51.81 ENCOUNTER FOR THERAPEUTIC DRUG MONITORING: ICD-10-CM

## 2022-04-19 LAB
HH POC INTERNATIONAL NORMALIZATION RATIO: 2.3
HH POC PROTIME: 27.8 SECONDS
INR PPP: 2.3

## 2022-04-19 PROCEDURE — G0299 HHS/HOSPICE OF RN EA 15 MIN: HCPCS

## 2022-04-19 PROCEDURE — 93793 ANTICOAG MGMT PT WARFARIN: CPT | Performed by: NURSE PRACTITIONER

## 2022-04-19 NOTE — PROGRESS NOTES
We received INR from LIBBY An with [facility]  by phone while she was still in the pt home. Pt denied med changes or bleeding problems. Nataliya states pt will be discharged next week. Nataliya was instructed on dosing and to have pt retest with home meter on 5/10; she repeated back correctly. Findings reported by Annabelle Davis RN.    Today's INR is Lab Results - Last 18 Months   Lab Units 04/19/22  0000   INR  2.30

## 2022-04-20 ENCOUNTER — READMISSION MANAGEMENT (OUTPATIENT)
Dept: CALL CENTER | Facility: HOSPITAL | Age: 82
End: 2022-04-20

## 2022-04-20 NOTE — OUTREACH NOTE
COPD/PN Week 3 Survey    Flowsheet Row Responses   [facility] patient discharged from? [place]   Does the patient have one of the following disease processes/diagnoses(primary or secondary)? COPD/Pneumonia   Was the primary reason for admission: Pneumonia   Week 3 attempt successful? Yes   Call start time 0939   Call end time 0943   Discharge diagnosis Acute respiratory failure with hypoxia   Is patient permission given to speak with other caregiver? Yes   List who call center can speak with son or dtr   Meds reviewed with patient/caregiver? Yes   Is the patient taking all medications as directed (includes completed medication regime)? Yes   Has the patient kept scheduled appointments due by today? Yes   What is the [facility] health agency?  [facility]   Has home health visited the patient within 72 hours of discharge? Yes   What DME was ordered? nebulizer and oxygen   DME comments 02 at 2 [facility]   Pulse Ox monitoring Intermittent   Pulse Ox device source Patient   O2 Sat comments O2 sat 95% on 2L- RA 80's but rebounds with rest, deep breathing/O2   Psychosocial issues? No   Comments Still having SOA w/exertion or when O2 is off. Appetite is poor. Sleeping in recliner as she feels smothering when in bed, even propped up.   What is the patient's perception of their health status since discharge? Improving   Nursing Interventions Nurse provided patient education   Is the patient/caregiver able to teach back signs and symptoms of worsening condition: Fever/chills, Shortness of breath, Chest pain   Is the patient/caregiver able to teach back importance of completing antibiotic course of treatment? Yes   Week 3 call completed? Yes          JEFF JARRELL - Registered Nurse

## 2022-04-29 ENCOUNTER — HOME CARE VISIT (OUTPATIENT)
Dept: HOME HEALTH SERVICES | Facility: CLINIC | Age: 82
End: 2022-04-29

## 2022-04-29 VITALS
HEART RATE: 84 BPM | RESPIRATION RATE: 20 BRPM | DIASTOLIC BLOOD PRESSURE: 82 MMHG | OXYGEN SATURATION: 93 % | TEMPERATURE: 98.5 F | SYSTOLIC BLOOD PRESSURE: 150 MMHG

## 2022-04-29 PROCEDURE — G0494 LPN CARE EA 15MIN HH/HOSPICE: HCPCS

## 2022-05-04 ENCOUNTER — HOME CARE VISIT (OUTPATIENT)
Dept: HOME HEALTH SERVICES | Facility: CLINIC | Age: 82
End: 2022-05-04

## 2022-05-04 ENCOUNTER — HOME CARE VISIT (OUTPATIENT)
Dept: HOME HEALTH SERVICES | Facility: HOME HEALTHCARE | Age: 82
End: 2022-05-04

## 2022-05-04 VITALS
RESPIRATION RATE: 18 BRPM | TEMPERATURE: 97.9 F | HEART RATE: 84 BPM | DIASTOLIC BLOOD PRESSURE: 72 MMHG | OXYGEN SATURATION: 98 % | SYSTOLIC BLOOD PRESSURE: 130 MMHG

## 2022-05-04 PROCEDURE — G0493 RN CARE EA 15 MIN HH/HOSPICE: HCPCS

## 2022-05-10 ENCOUNTER — ANTICOAGULATION VISIT (OUTPATIENT)
Dept: CARDIAC SURGERY | Facility: CLINIC | Age: 82
End: 2022-05-10

## 2022-05-10 DIAGNOSIS — Z79.01 LONG TERM CURRENT USE OF ANTICOAGULANT THERAPY: Primary | ICD-10-CM

## 2022-05-10 DIAGNOSIS — Z51.81 ENCOUNTER FOR THERAPEUTIC DRUG MONITORING: ICD-10-CM

## 2022-05-10 DIAGNOSIS — I48.91 ATRIAL FIBRILLATION, UNSPECIFIED TYPE: ICD-10-CM

## 2022-05-10 LAB — INR PPP: 1.8

## 2022-05-10 NOTE — PROGRESS NOTES
We received INR via fax from [facility] meter. I called and spoke to pt who denied med changes or bleeding problems. Pt denied missed doses or excessive vitamin K intake. Dose adjusted today only and pt instructed to hold green veggies 2 days; pt verbalized. Pt instructed to retest on Tuesday 5/24; pt verbalized. Patient instructed regarding medication; results given and questions answered. Nutritional counseling given.  Dietary factors affecting therapy addressed.  Patient instructed to monitor for excessive bruising or bleeding. Findings reported by Annabelle Davis RN.    Today's INR is Lab Results - Last 18 Months   Lab Units 05/10/22  0000   INR  1.80

## 2022-05-20 RX ORDER — ISOSORBIDE MONONITRATE 30 MG/1
30 TABLET, EXTENDED RELEASE ORAL DAILY
Qty: 90 TABLET | Refills: 3 | Status: SHIPPED | OUTPATIENT
Start: 2022-05-20 | End: 2022-05-25 | Stop reason: SDUPTHER

## 2022-05-23 ENCOUNTER — OFFICE VISIT (OUTPATIENT)
Dept: SLEEP MEDICINE | Facility: HOSPITAL | Age: 82
End: 2022-05-23

## 2022-05-23 VITALS
OXYGEN SATURATION: 94 % | BODY MASS INDEX: 26.37 KG/M2 | SYSTOLIC BLOOD PRESSURE: 148 MMHG | HEART RATE: 71 BPM | DIASTOLIC BLOOD PRESSURE: 84 MMHG | WEIGHT: 174 LBS | HEIGHT: 68 IN

## 2022-05-23 DIAGNOSIS — F51.04 PSYCHOPHYSIOLOGICAL INSOMNIA: Primary | ICD-10-CM

## 2022-05-23 DIAGNOSIS — Z91.14 NONCOMPLIANCE WITH CPAP TREATMENT: ICD-10-CM

## 2022-05-23 DIAGNOSIS — G47.31 COMPLEX SLEEP APNEA SYNDROME: ICD-10-CM

## 2022-05-23 DIAGNOSIS — G47.34 NOCTURNAL HYPOXIA: ICD-10-CM

## 2022-05-23 PROCEDURE — 99214 OFFICE O/P EST MOD 30 MIN: CPT | Performed by: NURSE PRACTITIONER

## 2022-05-23 RX ORDER — TRAZODONE HYDROCHLORIDE 50 MG/1
TABLET ORAL
Qty: 60 TABLET | Refills: 10 | Status: SHIPPED | OUTPATIENT
Start: 2022-05-23

## 2022-05-23 NOTE — PROGRESS NOTES
Sleep Clinic Follow Up    Date: 2022  Primary Care Provider: Donis Leija MD    Last office visit: 2022 (I reviewed this note)    CC: Follow up: complex sleep apnea not on therapy, insomnia       Interim History:  Since the last visit:    1) Complex sleep apnea-  Kirsten Rosales turned her ASV machine in because could not tolerate. She is currently using 2 LPM O2 nasal cannula during sleep. Doing well with oxygen.   Recently underwent overnight oximetry on 2 LPM O2 nasal cannula. Reviewed with patient.     2) Patient denies RLS symptoms.     3) Insomnia-  She is still taking trazodone 50 mg nightly. Sometimes she takes 1.5 tabs or 75 mg. Doing well with medicine and denies adverse side effects.       Sleep Testin. PSG on 2021, AHI of 30   2. CPAP titration on 2021, recommended 17/8 cm H2O with breath rate of 12  3. Currently not on PAP therapy     PAP Data:  Not on PAP    Bed time: 2300  Sleep latency: 30 minutes  Number of times awakens during the night: 1  Wake time: 0800  Estimated total sleep time at night: 6-7 hours  Caffeine intake: 1 cups of coffee, 2 cups of tea, and 0 sodas per day  Alcohol intake: 0 drinks per week  Nap time: not usually    Sleepiness with Driving: denies, not currently driving          PMHx, FH, SH reviewed and pertinent changes are:  unchanged from last office visit on 2022      REVIEW OF SYSTEMS:   Positive: SOA with exertion   Negative for chest pain, fever, chills, cough, N/V/D, abdominal pain.    Smoking:none         Exam:  Vitals:    22 1058   BP: 148/84   Pulse: 71   SpO2: 94%           22  1058   Weight: 78.9 kg (174 lb)     Body mass index is 26.46 kg/m². BMI is >= 25 and < 30. (Overweight) The following options were offered after discussion: nutrition counseling/recommendations      Gen:                No distress, conversant, pleasant, appears stated age, alert, oriented  Eyes:               Anicteric sclera, moist  conjunctiva, no lid lag                           EOMI   Lungs:             normal effort, non-labored breathing                          Clear to auscultation bilaterally          CV:                  irregular rhythm                           no lower extremity edema                 Psych:             Appropriate affect  Neuro:             CN 2-12 appear intact    Past Medical History:   Diagnosis Date   • Atherosclerotic heart disease of native coronary artery with unspecified angina pectoris (HCC)    • Atrial fibrillation (HCC)    • Breath shortness    • CAD (coronary artery disease)    • Hyperlipidemia    • Long term (current) use of anticoagulants    • Sick sinus syndrome (HCC)    • Unspecified hypothyroidism        Current Outpatient Medications:   •  albuterol (PROVENTIL) (2.5 MG/3ML) 0.083% nebulizer solution, Take 2.5 mg by nebulization Every 4 (Four) Hours As Needed for Wheezing., Disp: 540 mL, Rfl: 12  •  Calcium-Magnesium-Vitamin D (CALCIUM MAGNESIUM PO), Take 1 Piece by mouth Daily. With vitamin d and zinc, Disp: , Rfl:   •  Coenzyme Q10 (COQ10 PO), Take 10 mg by mouth Daily., Disp: , Rfl:   •  Cyanocobalamin (Vitamin B12) 1000 MCG tablet controlled-release, Take 1,000 mcg by mouth Daily., Disp: , Rfl:   •  furosemide (LASIX) 20 MG tablet, Take 20 mg by mouth 2 (Two) Times a Day., Disp: , Rfl:   •  isosorbide mononitrate (IMDUR) 30 MG 24 hr tablet, Take 1 tablet by mouth Daily., Disp: 90 tablet, Rfl: 3  •  levothyroxine (SYNTHROID, LEVOTHROID) 112 MCG tablet, Take 100 mcg by mouth Daily., Disp: , Rfl:   •  metFORMIN (GLUCOPHAGE) 500 MG tablet, Take 500 mg by mouth 2 (Two) Times a Day., Disp: , Rfl:   •  metoprolol succinate XL (TOPROL-XL) 50 MG 24 hr tablet, TAKE ONE TABLET BY MOUTH TWICE A DAY, Disp: 180 tablet, Rfl: 2  •  multivitamin with minerals tablet tablet, Take 1 tablet by mouth Daily., Disp: , Rfl:   •  O2 (OXYGEN), Inhale 2 L/min Continuous., Disp: , Rfl:   •  Omega-3 Fatty Acids (OMEGA 3  PO), Take 1,800 mg by mouth Daily., Disp: , Rfl:   •  oxybutynin XL (DITROPAN-XL) 10 MG 24 hr tablet, TAKE ONE TABLET BY MOUTH DAILY, Disp: , Rfl:   •  pravastatin (PRAVACHOL) 40 MG tablet, Take 1 tablet by mouth Daily., Disp: 90 tablet, Rfl: 3  •  ramelteon (Rozerem) 8 MG tablet, Take 1 tablet by mouth Every Night., Disp: 30 tablet, Rfl: 0  •  traZODone (DESYREL) 50 MG tablet, Take 1-2 tabs nightly as needed for insomnia, Disp: 60 tablet, Rfl: 3  •  VITAMIN D, CHOLECALCIFEROL, PO, Take  by mouth., Disp: , Rfl:   •  warfarin (COUMADIN) 5 MG tablet, TAKE ONE TABLET BY MOUTH ONCE NIGHTLY OR AS DIRECTED BY THE COUMADIN CLINIC, Disp: 30 tablet, Rfl: 5      Assessment and Plan:    1. Complex sleep apnea    1. Not on therapy - she is aware of the risks of untreated sleep apnea   2. Drowsy driving tips- do not drive if feeling sleepy   2. Nocturnal hypoxia   1. Continue 2 LPM O2 nasal cannula during sleep   2. Uses [facility]   3. Insomnia - sleep onset and or maintenance -  1. Trazodone  mg nightly. Refill today   2. Follow-up in 12 months or sooner if needed             I spent 20 minutes caring for Kirsten on this date of service. This time includes time spent by me in the following activities: preparing for the visit, obtaining and/or reviewing a separately obtained history, performing a medically appropriate examination and/or evaluation, counseling and educating the patient/family/caregiver, ordering medications, tests, or procedures and documenting information in the medical record. She requests follow-up in 1 year.           This document has been electronically signed by MICKIE Newell on May 23, 2022 11:03 CDT            CC: Donis Leija MD          No ref. provider found

## 2022-05-24 ENCOUNTER — ANTICOAGULATION VISIT (OUTPATIENT)
Dept: CARDIAC SURGERY | Facility: CLINIC | Age: 82
End: 2022-05-24

## 2022-05-24 DIAGNOSIS — I48.91 ATRIAL FIBRILLATION, UNSPECIFIED TYPE: ICD-10-CM

## 2022-05-24 DIAGNOSIS — Z79.01 LONG TERM CURRENT USE OF ANTICOAGULANT THERAPY: Primary | ICD-10-CM

## 2022-05-24 DIAGNOSIS — Z51.81 ENCOUNTER FOR THERAPEUTIC DRUG MONITORING: ICD-10-CM

## 2022-05-24 LAB — INR PPP: 2.2

## 2022-05-24 NOTE — PROGRESS NOTES
I spoke to pt over the phone who self-tested today.  Pt denies medication changes or bleeding issues.  I verified coumadin dose and instructed pt to continue the same.  Instructed pt to self-test on Wed, June 15th.  Instructions verbalized.  Findings reported by Baron Herrera RN.   Today's INR is Lab Results - Last 18 Months   Lab Units 05/24/22  0000   INR  2.20

## 2022-05-25 RX ORDER — ISOSORBIDE MONONITRATE 30 MG/1
30 TABLET, EXTENDED RELEASE ORAL DAILY
Qty: 90 TABLET | Refills: 3 | Status: SHIPPED | OUTPATIENT
Start: 2022-05-25

## 2022-06-15 ENCOUNTER — ANTICOAGULATION VISIT (OUTPATIENT)
Dept: CARDIAC SURGERY | Facility: CLINIC | Age: 82
End: 2022-06-15

## 2022-06-15 DIAGNOSIS — I48.91 ATRIAL FIBRILLATION, UNSPECIFIED TYPE: ICD-10-CM

## 2022-06-15 DIAGNOSIS — Z79.01 LONG TERM CURRENT USE OF ANTICOAGULANT THERAPY: Primary | ICD-10-CM

## 2022-06-15 DIAGNOSIS — Z51.81 ENCOUNTER FOR THERAPEUTIC DRUG MONITORING: ICD-10-CM

## 2022-06-15 LAB — INR PPP: 2.1

## 2022-06-15 NOTE — PROGRESS NOTES
We received INR via phone from pt who called in results from [facility] home meter. Pt denied med changes or bleeding problems. Current dose was verified. Pt instructed to continue current dose and to recheck INR on Wednesday 7/13; pt repeated back correctly. Patient instructed regarding medication; results given and questions answered. Nutritional counseling given.  Dietary factors affecting therapy addressed.  Patient instructed to monitor for excessive bruising or bleeding.  Findings reported by Annabelle Davis RN.    Today's INR is Lab Results - Last 18 Months   Lab Units 06/15/22  0000   INR  2.10

## 2022-06-20 DIAGNOSIS — I48.91 ATRIAL FIBRILLATION, UNSPECIFIED TYPE: ICD-10-CM

## 2022-06-20 RX ORDER — WARFARIN SODIUM 5 MG/1
TABLET ORAL
Qty: 30 TABLET | Refills: 5 | Status: SHIPPED | OUTPATIENT
Start: 2022-06-20 | End: 2022-06-20 | Stop reason: SDUPTHER

## 2022-06-20 RX ORDER — WARFARIN SODIUM 5 MG/1
TABLET ORAL
Qty: 30 TABLET | Refills: 5 | Status: SHIPPED | OUTPATIENT
Start: 2022-06-20

## 2022-07-13 ENCOUNTER — ANTICOAGULATION VISIT (OUTPATIENT)
Dept: CARDIAC SURGERY | Facility: CLINIC | Age: 82
End: 2022-07-13

## 2022-07-13 DIAGNOSIS — Z79.01 LONG TERM CURRENT USE OF ANTICOAGULANT THERAPY: Primary | ICD-10-CM

## 2022-07-13 DIAGNOSIS — Z51.81 ENCOUNTER FOR THERAPEUTIC DRUG MONITORING: ICD-10-CM

## 2022-07-13 DIAGNOSIS — I48.91 ATRIAL FIBRILLATION, UNSPECIFIED TYPE: ICD-10-CM

## 2022-07-13 LAB — INR PPP: 2.2

## 2022-07-13 NOTE — PROGRESS NOTES
I spoke to pt over the phone who self-test today.  Pt denies new medications or bleeding issues.  Verified coumadin dose and instructed pt to continue the same.  Instructed pt to self-test again on Wednesday, Aug 10th.  Pt verbalized instructions.    Findings reported by Baron Herrera RN.   Today's INR is Lab Results - Last 18 Months   Lab Units 07/13/22  0000   INR  2.20

## 2022-07-20 ENCOUNTER — OFFICE VISIT (OUTPATIENT)
Dept: CARDIOLOGY | Facility: CLINIC | Age: 82
End: 2022-07-20

## 2022-07-20 VITALS
HEIGHT: 68 IN | WEIGHT: 174.6 LBS | OXYGEN SATURATION: 94 % | BODY MASS INDEX: 26.46 KG/M2 | DIASTOLIC BLOOD PRESSURE: 74 MMHG | SYSTOLIC BLOOD PRESSURE: 110 MMHG | HEART RATE: 92 BPM

## 2022-07-20 DIAGNOSIS — I49.5 SSS (SICK SINUS SYNDROME): Primary | ICD-10-CM

## 2022-07-20 DIAGNOSIS — J47.9 BRONCHIOLECTASIS: Primary | ICD-10-CM

## 2022-07-20 PROCEDURE — 93000 ELECTROCARDIOGRAM COMPLETE: CPT | Performed by: INTERNAL MEDICINE

## 2022-07-20 PROCEDURE — 99214 OFFICE O/P EST MOD 30 MIN: CPT | Performed by: INTERNAL MEDICINE

## 2022-07-20 NOTE — PROGRESS NOTES
[facility] Cardiology  OFFICE NOTE    Cardiovascular Medicine  Jayda Myers M.D., RPVI         No referring provider defined for this encounter.    Thank you for asking me to see Kirsten Maribel Bobby for afib.    Shortness of Breath    Atrial Flutter      This is a 82 y.o. female with:    1. Coronary artery disease involving native coronary artery of native heart with angina pectoris (CMS/[facility])    2. Mixed hyperlipidemia    3. Essential hypertension    4. Permanent atrial fibrillation (CMS/[facility])    5. Type 2 diabetes mellitus without complication, without long-term current use of insulin (CMS/[facility])          Chief complaint -Follow-up CAD        History of present Illness- 82 y.o.-year-old lady with history of coronary disease prior stent placement in 2005, has chronic atrial fibrillation with sick sinus syndrome on warfarin for anticoagulation.  Last cardiac cath was in 2016 which showed moderate disease in the RCA and left circumflex and patent stent in the ramus. She is doing well no chest pain or shortness of breath. she has her pacemaker checked regularly through the pacemaker clinic.  She denies any GI symptoms or CNS symptoms.     No acute issues since last visit Dr. Silver.  She occasionally has some fullness in her chest when she lays down at night however when she gets up and take a deep breath it resolves pain orthopnea or PND though.  She has been using 2 pillows.  No swelling  She has stable shortness of breath on exertion.  No chest pain.  Has been taking Coumadin without any bleeding problems.  Denying any palpitations.    07/20/2022:  Admitted to the hospital in March with pneumonia.  Reported improvement in symptoms of shortness of breath since treatment for pneumonia.  Denying any chest pain.  Bleeding problems.    11/16/2020:  Patient reported over last several months has been having worsening orthopnea.  She was hemogram her care physician BNP was mildly elevated.  Denying any  chest pain or palpitations.  She did have a fall in September.    02/03/2021:  No acute issues since last visit continues to have intermittent orthopnea.  She is taking Lasix with improvement in her symptoms.  Echocardiogram is normal LV systolic function.  No bleeding problems from Coumadin.    10/18/2021:  She was found to have severe sleep apnea and has been started on BiPAP with some improvement in her symptoms.    03/18/2022:  Reported worsening shortness of breath over the last few months, his known significant orthopnea from underlying sleep apnea which is stable at baseline.  No PND.  No lower extremity swelling.  Denying any chest pain.        Review of Systems - Constitution: Negative for weakness, weight gain and weight loss.   HENT: Negative for congestion.    Eyes: Negative for blurred vision.   Cardiovascular: As mentioned above  Respiratory: Negative for cough and hemoptysis.    Endocrine: Negative for polydipsia and polyuria.   Hematologic/Lymphatic: Negative for bleeding problem. Does not bruise/bleed easily.   Skin: Negative for flushing.   Musculoskeletal: Negative for neck pain and stiffness.   Gastrointestinal: Negative for abdominal pain, diarrhea, jaundice, melena, nausea and vomiting.   Genitourinary: Negative for dysuria and hematuria.   Neurological: Negative for dizziness, focal weakness and numbness.   Psychiatric/Behavioral: Negative for altered mental status and depression.          All other systems were reviewed and were negative.    family history is not on file.     reports that she has quit smoking. She has never used smokeless tobacco. She reports that she does not drink alcohol and does not use drugs.    Allergies   Allergen Reactions   • Aspirin Hives   • Celebrex [Celecoxib] Hives   • Rythmol [Propafenone] Nausea And Vomiting         Current Outpatient Medications:   •  albuterol (PROVENTIL) (2.5 MG/3ML) 0.083% nebulizer solution, Take 2.5 mg by nebulization Every 4 (Four)  "Hours As Needed for Wheezing., Disp: 540 mL, Rfl: 12  •  Calcium-Magnesium-Vitamin D (CALCIUM MAGNESIUM PO), Take 1 Piece by mouth Daily. With vitamin d and zinc, Disp: , Rfl:   •  Coenzyme Q10 (COQ10 PO), Take 10 mg by mouth Daily., Disp: , Rfl:   •  Cyanocobalamin (Vitamin B12) 1000 MCG tablet controlled-release, Take 1,000 mcg by mouth Daily., Disp: , Rfl:   •  furosemide (LASIX) 20 MG tablet, Take 20 mg by mouth Daily., Disp: , Rfl:   •  isosorbide mononitrate (IMDUR) 30 MG 24 hr tablet, Take 1 tablet by mouth Daily., Disp: 90 tablet, Rfl: 3  •  levothyroxine (SYNTHROID, LEVOTHROID) 112 MCG tablet, Take 100 mcg by mouth Daily., Disp: , Rfl:   •  metFORMIN (GLUCOPHAGE) 500 MG tablet, Take 500 mg by mouth 2 (Two) Times a Day., Disp: , Rfl:   •  metoprolol succinate XL (TOPROL-XL) 50 MG 24 hr tablet, TAKE ONE TABLET BY MOUTH TWICE A DAY, Disp: 180 tablet, Rfl: 2  •  multivitamin with minerals tablet tablet, Take 1 tablet by mouth Daily., Disp: , Rfl:   •  O2 (OXYGEN), Inhale 2 L/min Continuous., Disp: , Rfl:   •  Omega-3 Fatty Acids (OMEGA 3 PO), Take 1,800 mg by mouth Daily., Disp: , Rfl:   •  pravastatin (PRAVACHOL) 40 MG tablet, Take 1 tablet by mouth Daily., Disp: 90 tablet, Rfl: 3  •  traZODone (DESYREL) 50 MG tablet, Take 1-2 tabs nightly as needed for insomnia, Disp: 60 tablet, Rfl: 10  •  VITAMIN D, CHOLECALCIFEROL, PO, Take  by mouth., Disp: , Rfl:   •  warfarin (COUMADIN) 5 MG tablet, Take 1 tablet nightly or as directed, Disp: 30 tablet, Rfl: 5    Physical Exam:  Vitals:    07/20/22 0904   BP: 110/74   BP Location: Left arm   Patient Position: Sitting   Cuff Size: Adult   Pulse: 92   SpO2: 94%   Weight: 79.2 kg (174 lb 9.6 oz)   Height: 172.7 cm (68\")   PainSc: 0-No pain     Current Pain Level: none  Pulse Ox: Normal  on room air  General: alert, appears stated age and cooperative     Body Habitus: well-nourished    HEENT: Head: Normocephalic, no lesions, without obvious abnormality. No arcus senilis, " xanthelasma or xanthomas.    Neuro: alert, oriented x3  Pulses: 2+ and symmetric  JVP: Volume/Pulsation: Normal.  Normal waveforms.   Appropriate inspiratory decrease.  No Kussmaul's. No Jaylin's.   Carotid Exam: no bruit normal pulsation bilaterally   Carotid Volume: normal.     Respirations: no increased work of breathing   Chest:  Normal    Pulmonary:Normal   Precordium: Normal impulses. P2 is not palpable.  RV Heave: absent  LV Heave: absent  [place]:  normal size and placement  Palpable S4: absent.  Heart rate: normal    Heart Rhythm: irregular     Heart Sounds: S1: normal  S2: normal  S3: absent   S4: absent  Opening Snap: absent    Pericardial Rub:  Absent: .    Abdomen:   Appearance: normal .  Palpation: Soft, non-tender to palpation, bowel sounds positive in all four quadrants; no guarding or rebound tenderness  Extremity: no edema.   LE Skin: no rashes  LE Hair:  normal  LE Pulses: well perfused with normal pulses in the distal extremities  Pallor on elevation: Absent. Rubor on dependency: None      DATA REVIEWED:     EKG. I personally reviewed and interpreted the EKG.  Atrial flutter with variable AV block and intermittent v paced rhythm    ECG/EMG Results (all)     None        ---------------------------------------------------  TTE/DEENA:  Results for orders placed during the hospital encounter of 03/25/22    Adult Transthoracic Echo Complete W/ Cont if Necessary Per Protocol    Interpretation Summary  · Left ventricular wall thickness is consistent with mild concentric hypertrophy.  · Left ventricular ejection fraction appears to be 61 - 65%.  · Left ventricular diastolic function was indeterminate.  · The right atrial cavity is borderline dilated.  · Estimated right ventricular systolic pressure from tricuspid regurgitation is mildly elevated (35-45 mmHg).  · Left atrial volume is moderately  increased.      --------------------------------------------------------------------------------------------------  LABS:     The CVD Risk score (Herbie et al., 2008) failed to calculate for the following reasons:    The 2008 CVD risk score is only valid for ages 30 to 74         Lab Results   Component Value Date    GLUCOSE 135 (H) 03/27/2022    BUN 13 06/14/2022    CREATININE 0.6 (L) 06/14/2022    EGFRIFNONA 70 08/09/2019    EGFRIFAFRI 97 06/08/2021    BCR 22.2 03/27/2022    K 4.6 06/14/2022    CO2 34 (H) 06/14/2022    CALCIUM 9.5 06/14/2022    ALBUMIN 4.2 06/14/2022    AST 59 (H) 06/14/2022    ALT 27 06/14/2022     Lab Results   Component Value Date    WBC 9.24 03/27/2022    HGB 13.8 03/27/2022    HCT 42.2 03/27/2022    MCV 88.1 03/27/2022     03/27/2022     Lab Results   Component Value Date    CHOL 165 06/13/2020    CHLPL 181 06/14/2022    TRIG 192 (H) 06/14/2022    HDL 49 06/14/2022    LDL 94 06/14/2022     Lab Results   Component Value Date    TSH 0.57 06/14/2022     Lab Results   Component Value Date    CKTOTAL 51 02/10/2014    CKMB 1.0 02/10/2014    TROPONINI <0.012 02/10/2014    TROPONINT <0.010 03/25/2022     Lab Results   Component Value Date    HGBA1C 6.7 (H) 06/14/2022     No results found for: DDIMER  Lab Results   Component Value Date    ALT 27 06/14/2022     Lab Results   Component Value Date    HGBA1C 6.7 (H) 06/14/2022    HGBA1C 6.9 (H) 12/07/2021    HGBA1C 6.7 (H) 06/08/2021     Lab Results   Component Value Date    CREATININE 0.6 (L) 06/14/2022     No results found for: IRON, TIBC, FERRITIN  Lab Results   Component Value Date    INR 2.20 07/13/2022    INR 2.10 06/15/2022    INR 2.20 05/24/2022    PROTIME 22.8 (H) 03/28/2022    PROTIME 25.2 (H) 03/27/2022    PROTIME 24.6 (H) 03/26/2022       Assessment/Plan     CAD prior stent placement doing well no chest pain.  She takes Imdur for mild angina and she is doing tolerating the isosorbide 30 mg daily, I asked her to exercise  regularly  Allergic to aspirin  Recent stress test without significant ischemia.    Chronic atrial fibrillation/flutter with sick sinus syndrome status post pacemaker on warfarin and rate controlled with Toprol-XL.  I discussed options of atrial flutter ablation and Novacks however patient wants to continue medical therapy and Coumadin at this point.  Echo 11/20 with EF of 56-60%. LA was moderately dilated.   Continue metoprolol XL and warfarin.      Hyperlipidemia on pravastatin      Diabetes on metformin and A1c is good.     Hypothyroidism on Synthroid supplements    Shortness of breath:   Repeat echo March 2022 showed preserved LV systolic function.  Diastolic function was indeterminate.  RVSP was mildly elevated.  Left atrium was moderately increased.  Her symptoms are predominantly when she is sleeping bedtime.  She has been seen by pulmonary has been started on inhalers.    Also has been diagnosed with severe sleep apnea.  Stress test had shown medium size infarct anterior lateral wall with no significant ischemia.  We did discuss option of cardiac cath with her however symptoms are improved and wants to hold off on cath for now.  BNP was normal.  Established with pulmonary at [place].    We did talk about consideration for ablation/cardioversion for A. fib with patient wants to hold off for now.  CBC with normal hemoglobin              Prevention:  Patient's Body mass index is 26.55 kg/m². BMI is above normal parameters. Recommendations include: exercise counseling and nutrition counseling.      Kirsten López Bobby  reports that she has quit smoking. She has never used smokeless tobacco..    AAA Screening:   Not needed            This document has been electronically signed by Jayda Myers MD on July 20, 2022 09:11 CDT

## 2022-07-28 ENCOUNTER — TRANSCRIBE ORDERS (OUTPATIENT)
Dept: PULMONOLOGY | Facility: HOSPITAL | Age: 82
End: 2022-07-28

## 2022-07-28 DIAGNOSIS — J47.9 BRONCHIOLECTASIS: Primary | ICD-10-CM

## 2022-07-29 LAB
QT INTERVAL: 386 MS
QTC INTERVAL: 477 MS

## 2022-08-04 ENCOUNTER — APPOINTMENT (OUTPATIENT)
Dept: PULMONOLOGY | Facility: HOSPITAL | Age: 82
End: 2022-08-04

## 2022-08-10 ENCOUNTER — ANTICOAGULATION VISIT (OUTPATIENT)
Dept: CARDIAC SURGERY | Facility: CLINIC | Age: 82
End: 2022-08-10

## 2022-08-10 DIAGNOSIS — Z51.81 ENCOUNTER FOR THERAPEUTIC DRUG MONITORING: ICD-10-CM

## 2022-08-10 DIAGNOSIS — I48.91 ATRIAL FIBRILLATION, UNSPECIFIED TYPE: ICD-10-CM

## 2022-08-10 DIAGNOSIS — Z79.01 LONG TERM CURRENT USE OF ANTICOAGULANT THERAPY: Primary | ICD-10-CM

## 2022-08-10 LAB — INR PPP: 2.4

## 2022-08-10 NOTE — PROGRESS NOTES
Pt called to report INR from [facility] home meter. Pt denied med changes or bleeding problems. Pt verified current dose. Pt instructed to continue dose and recheck INR on Wednesday 9/7; pt repeated back correctly. Patient instructed regarding medication; results given and questions answered. Nutritional counseling given.  Dietary factors affecting therapy addressed.  Patient instructed to monitor for excessive bruising or bleeding.  Findings reported by Annabelle Davis RN.    Today's INR is Lab Results - Last 18 Months   Lab Units 08/10/22  0000   INR  2.40

## 2022-08-11 ENCOUNTER — HOSPITAL ENCOUNTER (OUTPATIENT)
Dept: PULMONOLOGY | Facility: HOSPITAL | Age: 82
Setting detail: THERAPIES SERIES
Discharge: HOME | End: 2022-08-11

## 2022-08-11 VITALS
WEIGHT: 170 LBS | OXYGEN SATURATION: 90 % | HEART RATE: 83 BPM | HEIGHT: 68 IN | DIASTOLIC BLOOD PRESSURE: 80 MMHG | BODY MASS INDEX: 25.76 KG/M2 | SYSTOLIC BLOOD PRESSURE: 164 MMHG

## 2022-08-11 DIAGNOSIS — J47.1 BRONCHIECTASIS WITH ACUTE EXACERBATION: Primary | ICD-10-CM

## 2022-08-11 PROCEDURE — G0238 OTH RESP PROC, INDIV: HCPCS

## 2022-08-11 NOTE — PROGRESS NOTES
"Pulmonary Rehab Initial Assessment      Name: Kirsten Rosales  :1940 Allergies:Aspirin, Celebrex [celecoxib], and Rythmol [propafenone]   MRN: [id number] 82 y.o. Physician: Donis Leija MD   Primary Diagnosis: Bronchiectias Event Date: 2022 Specialist: Pulmonary Rehab   Secondary Diagnosis:   Note Author: Macie Alva, RRT     Cardiovascular History: HTN, A FIB/A FLUTTER, CAD, SSS. ANTICOAGULANT,  WARAFRIN, PACEMAKER,STENT     EXERCISE AT HOME   NO:              Ambulatory Status: Independent  Ambulatory Fall Risk Assessed on Initial Visit:  YES 6 Minute Walk Pre- Pulmonary Rehab:  Distance:1080ft      RPE:12        RPD: 2              MPH: 2.0  Max. HR:106        SPO2  90%       Resting BP: 164/80     Peak BP: 131/84  Recovery BP: 148/86       NUTRITION  Lipids: If yes, labs as follows;  Total: No components found for: CHOLESTEROL  HDL:   HDL Cholesterol   Date Value Ref Range Status   2022 49 23 - 92 mg/dL Final    Lipids continued:  LDL:  LDL Cholesterol    Date Value Ref Range Status   2022 94 mg/dL mg/dL Final     Comment:         OPTIMAL: <100 mg/dl  LOW RISK: 100-129 mg/dl  BORDERLINE HIGH: 130-159 mg/dl  HIGH: 160-189 mg/dl  VERY HIGH: >189 mg/dl     Triglyceride: No components found for: TRIGLYCERIDE   Weight Management:                 Weight: 170lb  Height: 68\"                                   BMI: 26.5     Alcohol Use: no Diabetes:Yes,  Monitors BS at home- yes, Frequency: {BS Monitoring Random BS: 1-2 x per week     Latest Reference Range & Units 22 09:13   Hemoglobin A1C 4.2 - 5.8 % 6.7 (H) (E)   (H): Data is abnormally high  (E): External lab result       SOCIAL HISTORY  Social History     Socioeconomic History   • Marital status:    Tobacco Use   • Smoking status: Former Smoker   • Smokeless tobacco: Never Used   • Tobacco comment: man 30 years ago   Substance and Sexual Activity   • Alcohol use: No   • Drug use: No   • Sexual activity: Defer    " Learning Barriers:Educational Handbook Given  Family Support:{YES  Living Arrangement: {lives alone Tobacco Adjunct:no  Do you live with a smoker:  no:     PSYCHOSOCIAL  Clinical Depression:  no    Stress: {yes low     Assess presence or absence of depression using a valid screening tool:   YES        COMORBIDITIES  Sleep Apnea: {yes    If yes, Choose: N/A    Cancer:  no:    Stroke: s no:   Pneumonia: {yes  If yes, how many times 5-6    Osteoporosis: no:    GI Problems: no: Frequent colds/allergies: {yes SEASONAL allergies bother her occasionally    Other illnesses, surgeries, or comments   Back surgery  Double mastectomy  Stent/pacemaker            PULMONARY:  Do you use a nebulizer?: {yes    If yes, PRN    Do you use oxygen at home?: {yes   If yes, amount 2l QHS       Do you have a daily cough?:  no:  I productive occasionally of small amount white/clear secretions.    Do you every notice yourself wheezing?:  no  I  Other pulmonary/breathing problems?: {yes JOSEPH   OTHER:  Do you have physical limitations?:  no:      Do you need assistance with ADLs?: no   Do you climb stairs at home?: no:  I    Have you ever attended a pulmonary rehab?: {yes no:    MRC Dyspnea Scale: 0 - 4:  3 = stops for breath after walking about 100 yards or after a few minutes on the level     Patient Goals: Increase strength and stamina, do housework and gardening with less shortness of air.     DISCHARGE PLANNING:  Do you have any home exercise equipment?: no:    What are you plans for continuing exercise after completion of pulmonary rehab? UNDECIDED     EDUCATION:  Pursed - lip breathing, Diaphragmatic breathing, Relaxation techniques and Program information folder       PRE-PROGRAM ASSESSMENT:  PFT Date:9/28/2020    FEV1/FVC: 79% FEV1: 72%    FVC: 61% DLCO: 102     Time of arrival: 1400    Time of departure: 1515           8/11/2022  14:23 CDT  Macie Alva, RRT

## 2022-08-16 ENCOUNTER — HOSPITAL ENCOUNTER (OUTPATIENT)
Dept: PULMONOLOGY | Facility: HOSPITAL | Age: 82
Setting detail: THERAPIES SERIES
Discharge: HOME | End: 2022-08-16

## 2022-08-16 VITALS — HEART RATE: 85 BPM | OXYGEN SATURATION: 97 % | DIASTOLIC BLOOD PRESSURE: 91 MMHG | SYSTOLIC BLOOD PRESSURE: 153 MMHG

## 2022-08-16 DIAGNOSIS — J47.9 BRONCHIOLECTASIS: Primary | ICD-10-CM

## 2022-08-16 PROCEDURE — G0238 OTH RESP PROC, INDIV: HCPCS

## 2022-08-18 ENCOUNTER — APPOINTMENT (OUTPATIENT)
Dept: PULMONOLOGY | Facility: HOSPITAL | Age: 82
End: 2022-08-18

## 2022-08-23 ENCOUNTER — HOSPITAL ENCOUNTER (OUTPATIENT)
Dept: PULMONOLOGY | Facility: HOSPITAL | Age: 82
Setting detail: THERAPIES SERIES
Discharge: HOME | End: 2022-08-23

## 2022-08-23 VITALS — SYSTOLIC BLOOD PRESSURE: 163 MMHG | OXYGEN SATURATION: 92 % | DIASTOLIC BLOOD PRESSURE: 72 MMHG | HEART RATE: 98 BPM

## 2022-08-23 DIAGNOSIS — J47.9 BRONCHIOLECTASIS: Primary | ICD-10-CM

## 2022-08-23 PROCEDURE — G0238 OTH RESP PROC, INDIV: HCPCS

## 2022-08-25 ENCOUNTER — APPOINTMENT (OUTPATIENT)
Dept: PULMONOLOGY | Facility: HOSPITAL | Age: 82
End: 2022-08-25

## 2022-08-30 ENCOUNTER — HOSPITAL ENCOUNTER (OUTPATIENT)
Dept: PULMONOLOGY | Facility: HOSPITAL | Age: 82
Setting detail: THERAPIES SERIES
Discharge: HOME | End: 2022-08-30

## 2022-08-30 VITALS — OXYGEN SATURATION: 90 % | SYSTOLIC BLOOD PRESSURE: 144 MMHG | HEART RATE: 64 BPM | DIASTOLIC BLOOD PRESSURE: 72 MMHG

## 2022-08-30 DIAGNOSIS — J47.9 BRONCHIOLECTASIS: Primary | ICD-10-CM

## 2022-08-30 PROCEDURE — G0238 OTH RESP PROC, INDIV: HCPCS

## 2022-09-01 ENCOUNTER — HOSPITAL ENCOUNTER (OUTPATIENT)
Dept: PULMONOLOGY | Facility: HOSPITAL | Age: 82
Setting detail: THERAPIES SERIES
Discharge: HOME | End: 2022-09-01

## 2022-09-01 VITALS — SYSTOLIC BLOOD PRESSURE: 138 MMHG | DIASTOLIC BLOOD PRESSURE: 88 MMHG | HEART RATE: 101 BPM | OXYGEN SATURATION: 89 %

## 2022-09-01 DIAGNOSIS — J47.9 BRONCHIOLECTASIS: Primary | ICD-10-CM

## 2022-09-01 PROCEDURE — G0238 OTH RESP PROC, INDIV: HCPCS

## 2022-09-06 ENCOUNTER — ANTICOAGULATION VISIT (OUTPATIENT)
Dept: CARDIAC SURGERY | Facility: CLINIC | Age: 82
End: 2022-09-06

## 2022-09-06 ENCOUNTER — APPOINTMENT (OUTPATIENT)
Dept: PULMONOLOGY | Facility: HOSPITAL | Age: 82
End: 2022-09-06

## 2022-09-06 DIAGNOSIS — I48.91 ATRIAL FIBRILLATION, UNSPECIFIED TYPE: ICD-10-CM

## 2022-09-06 DIAGNOSIS — Z51.81 ENCOUNTER FOR THERAPEUTIC DRUG MONITORING: ICD-10-CM

## 2022-09-06 DIAGNOSIS — Z79.01 LONG TERM CURRENT USE OF ANTICOAGULANT THERAPY: Primary | ICD-10-CM

## 2022-09-06 LAB — INR PPP: 2.1

## 2022-09-06 NOTE — PROGRESS NOTES
Pt called in INR of 2.1 with home meter today and reports she started Prednisone on 9/4 for Covid. Denies any other medication changes. PT instructed to continue current dose and recheck in 2 days. Verbalized understanding.  Patient instructed regarding medication; results given and questions answered. Nutritional counseling given.  Dietary factors affecting therapy addressed.  Patient instructed to monitor for excessive bruising or bleeding.  Findings reported by Jaqui Narvaez RN  Today's INR is Lab Results - Last 18 Months   Lab Units 09/06/22  0000   INR  2.10

## 2022-09-08 ENCOUNTER — ANTICOAGULATION VISIT (OUTPATIENT)
Dept: CARDIAC SURGERY | Facility: CLINIC | Age: 82
End: 2022-09-08

## 2022-09-08 ENCOUNTER — APPOINTMENT (OUTPATIENT)
Dept: PULMONOLOGY | Facility: HOSPITAL | Age: 82
End: 2022-09-08

## 2022-09-08 DIAGNOSIS — I48.91 ATRIAL FIBRILLATION, UNSPECIFIED TYPE: ICD-10-CM

## 2022-09-08 DIAGNOSIS — Z79.01 LONG TERM CURRENT USE OF ANTICOAGULANT THERAPY: Primary | ICD-10-CM

## 2022-09-08 DIAGNOSIS — Z51.81 ENCOUNTER FOR THERAPEUTIC DRUG MONITORING: ICD-10-CM

## 2022-09-08 LAB — INR PPP: 3.3

## 2022-09-08 NOTE — PROGRESS NOTES
Pt called in results by phone. Reports she has 2 more doses of Prednisone left and will be done tomorrow morning.  Dose adjusted for today and repeated back warfarin schedule correctly.  Told pt to have serving of green veggies today. Pt verbalized understanding. Will recheck in office on 9/20 and told pt to bring home meter to appointment.  Patient instructed regarding medication; results given and questions answered. Nutritional counseling given.  Dietary factors affecting therapy addressed.  Patient instructed to monitor for excessive bruising or bleeding.  Findings reported by Jaqui Narvaez RN  Today's INR is Lab Results - Last 18 Months   Lab Units 09/08/22  0000   INR  3.30

## 2022-09-13 ENCOUNTER — APPOINTMENT (OUTPATIENT)
Dept: PULMONOLOGY | Facility: HOSPITAL | Age: 82
End: 2022-09-13

## 2022-09-15 ENCOUNTER — APPOINTMENT (OUTPATIENT)
Dept: PULMONOLOGY | Facility: HOSPITAL | Age: 82
End: 2022-09-15

## 2022-09-20 ENCOUNTER — APPOINTMENT (OUTPATIENT)
Dept: PULMONOLOGY | Facility: HOSPITAL | Age: 82
End: 2022-09-20

## 2022-09-20 ENCOUNTER — ANTICOAGULATION VISIT (OUTPATIENT)
Dept: CARDIAC SURGERY | Facility: CLINIC | Age: 82
End: 2022-09-20

## 2022-09-20 VITALS — HEART RATE: 87 BPM | OXYGEN SATURATION: 98 %

## 2022-09-20 DIAGNOSIS — Z51.81 ENCOUNTER FOR THERAPEUTIC DRUG MONITORING: ICD-10-CM

## 2022-09-20 DIAGNOSIS — Z79.01 LONG TERM CURRENT USE OF ANTICOAGULANT THERAPY: Primary | ICD-10-CM

## 2022-09-20 DIAGNOSIS — I48.91 ATRIAL FIBRILLATION, UNSPECIFIED TYPE: ICD-10-CM

## 2022-09-20 LAB — INR PPP: 5.3 (ref 0.9–1.1)

## 2022-09-20 PROCEDURE — 85610 PROTHROMBIN TIME: CPT | Performed by: NURSE PRACTITIONER

## 2022-09-20 PROCEDURE — 93793 ANTICOAG MGMT PT WARFARIN: CPT | Performed by: NURSE PRACTITIONER

## 2022-09-20 PROCEDURE — 36416 COLLJ CAPILLARY BLOOD SPEC: CPT | Performed by: NURSE PRACTITIONER

## 2022-09-20 NOTE — PROGRESS NOTES
Pt here for yearly in office evaluation. Pt brought in [employer] meter. Pt INR per our meter 5.3 and per her meter 5.0. Pt reports taking Tylenol twice daily for a few days and she started Levaquin yesterday daily for 7 days. Pt was instructed to hold coumadin tonight and tomorrow night; pt verbalized. Pt was instructed to eat a spinach salad today and broccoli tomorrow; pt verbalized. Patient instructed regarding medication; results given and questions answered. Nutritional counseling given.  Dietary factors affecting therapy addressed.  Patient instructed to monitor for excessive bruising or bleeding. Notify provider if you experience excessive bleeding from the nose, cuts, gums, rectum, urinary tract, or vagina. Reddish or brown urine or stool. Vomiting of blood or hemorrhoidal bleeding. If major injury occurs present to the Emergency Department. Pt instructed to recheck INR on Thursday 9/22; pt verbalized an understanding. Findings reported by Annabelle Davis RN.   Today's INR is Lab Results - Last 18 Months   Lab Units 09/20/22  0000   INR  5.30*

## 2022-09-22 ENCOUNTER — ANTICOAGULATION VISIT (OUTPATIENT)
Dept: CARDIAC SURGERY | Facility: CLINIC | Age: 82
End: 2022-09-22

## 2022-09-22 DIAGNOSIS — Z79.01 LONG TERM CURRENT USE OF ANTICOAGULANT THERAPY: Primary | ICD-10-CM

## 2022-09-22 DIAGNOSIS — I48.91 ATRIAL FIBRILLATION, UNSPECIFIED TYPE: ICD-10-CM

## 2022-09-22 DIAGNOSIS — Z51.81 ENCOUNTER FOR THERAPEUTIC DRUG MONITORING: ICD-10-CM

## 2022-09-22 LAB — INR PPP: 1.9

## 2022-09-22 NOTE — PROGRESS NOTES
Spoke to pt over the phon who self-tested today.  Pt will complete Levaquin this Sunday.  Instructed pt on weekly coumadin dose and to limit green intake today.  Instructed pt to self-test again next Tuesday, Sept 27th.  Instructions verbalized.  Findings reported by Baron Herrera RN.   Today's INR is Lab Results - Last 18 Months   Lab Units 09/22/22  0000   INR  1.90

## 2022-09-27 ENCOUNTER — ANTICOAGULATION VISIT (OUTPATIENT)
Dept: CARDIAC SURGERY | Facility: CLINIC | Age: 82
End: 2022-09-27

## 2022-09-27 ENCOUNTER — APPOINTMENT (OUTPATIENT)
Dept: PULMONOLOGY | Facility: HOSPITAL | Age: 82
End: 2022-09-27

## 2022-09-27 DIAGNOSIS — Z51.81 ENCOUNTER FOR THERAPEUTIC DRUG MONITORING: ICD-10-CM

## 2022-09-27 DIAGNOSIS — I48.91 ATRIAL FIBRILLATION, UNSPECIFIED TYPE: ICD-10-CM

## 2022-09-27 DIAGNOSIS — Z79.01 LONG TERM CURRENT USE OF ANTICOAGULANT THERAPY: Primary | ICD-10-CM

## 2022-09-27 LAB — INR PPP: 1.7

## 2022-09-27 NOTE — PROGRESS NOTES
Pt called in home meter results by phone.  Denies any medication changes or bleeding issues. Pt dose adjusted and read back correctly by patient. Avoid green veggies for 3-4 days recheck in 1 week. Pt verbalized understanding. Patient instructed regarding medication; results given and questions answered. Nutritional counseling given.  Dietary factors affecting therapy addressed.  Patient instructed to monitor for excessive bruising or bleeding.  Findings reported by Jaqui Narvaez RN  Today's INR is Lab Results - Last 18 Months   Lab Units 09/27/22  0000   INR  1.70

## 2022-09-29 ENCOUNTER — APPOINTMENT (OUTPATIENT)
Dept: PULMONOLOGY | Facility: HOSPITAL | Age: 82
End: 2022-09-29

## 2022-10-04 ENCOUNTER — ANTICOAGULATION VISIT (OUTPATIENT)
Dept: CARDIAC SURGERY | Facility: CLINIC | Age: 82
End: 2022-10-04

## 2022-10-04 DIAGNOSIS — Z79.01 LONG TERM CURRENT USE OF ANTICOAGULANT THERAPY: Primary | ICD-10-CM

## 2022-10-04 DIAGNOSIS — Z51.81 ENCOUNTER FOR THERAPEUTIC DRUG MONITORING: ICD-10-CM

## 2022-10-04 LAB — INR PPP: 1.7

## 2022-10-04 NOTE — PROGRESS NOTES
Received results by phone from patient.  Denies any missed doses or excessive vitamin K intake.  Pt dose adjusted and told to avoid green veggies for 3-4 days. Pt will recheck in 1 week. Pt verbalized understanding. Patient instructed regarding medication; results given and questions answered. Nutritional counseling given.  Dietary factors affecting therapy addressed.  Patient instructed to monitor for excessive bruising or bleeding.  Findings reported by Jaqui Narvaez RN  Today's INR is Lab Results - Last 18 Months   Lab Units 10/04/22  0000   INR  1.70

## 2022-10-06 ENCOUNTER — APPOINTMENT (OUTPATIENT)
Dept: PULMONOLOGY | Facility: HOSPITAL | Age: 82
End: 2022-10-06

## 2022-10-11 ENCOUNTER — APPOINTMENT (OUTPATIENT)
Dept: PULMONOLOGY | Facility: HOSPITAL | Age: 82
End: 2022-10-11

## 2022-10-12 ENCOUNTER — CLINICAL SUPPORT (OUTPATIENT)
Dept: CARDIOLOGY | Facility: CLINIC | Age: 82
End: 2022-10-12

## 2022-10-12 ENCOUNTER — ANTICOAGULATION VISIT (OUTPATIENT)
Dept: CARDIAC SURGERY | Facility: CLINIC | Age: 82
End: 2022-10-12

## 2022-10-12 DIAGNOSIS — I48.91 ATRIAL FIBRILLATION, UNSPECIFIED TYPE: ICD-10-CM

## 2022-10-12 DIAGNOSIS — Z79.01 LONG TERM CURRENT USE OF ANTICOAGULANT THERAPY: Primary | ICD-10-CM

## 2022-10-12 DIAGNOSIS — Z95.0 PACEMAKER: ICD-10-CM

## 2022-10-12 DIAGNOSIS — Z51.81 ENCOUNTER FOR THERAPEUTIC DRUG MONITORING: ICD-10-CM

## 2022-10-12 DIAGNOSIS — I49.5 SSS (SICK SINUS SYNDROME): Primary | ICD-10-CM

## 2022-10-12 LAB — INR PPP: 1.8

## 2022-10-12 NOTE — PROGRESS NOTES
Pt called to report INR from [employer] meter. Pt denied med changes, bleeding problems, missed doses, or excessive vitamin K intake. Pt instructed on dosing and to hold green veggies 3 days; pt verbalized. Pt instructed to recheck INR on Wednesday 10/19; pt repeated back correctly. Patient instructed regarding medication; results given and questions answered. Nutritional counseling given.  Dietary factors affecting therapy addressed.  Patient instructed to monitor for excessive bruising or bleeding. Findings reported by Annabelle Davis RN.    Today's INR is Lab Results - Last 18 Months   Lab Units 10/12/22  0000   INR  1.80

## 2022-10-13 ENCOUNTER — APPOINTMENT (OUTPATIENT)
Dept: PULMONOLOGY | Facility: HOSPITAL | Age: 82
End: 2022-10-13

## 2022-10-20 ENCOUNTER — ANTICOAGULATION VISIT (OUTPATIENT)
Dept: CARDIAC SURGERY | Facility: CLINIC | Age: 82
End: 2022-10-20

## 2022-10-20 ENCOUNTER — APPOINTMENT (OUTPATIENT)
Dept: PULMONOLOGY | Facility: HOSPITAL | Age: 82
End: 2022-10-20

## 2022-10-20 DIAGNOSIS — Z79.01 LONG TERM CURRENT USE OF ANTICOAGULANT THERAPY: Primary | ICD-10-CM

## 2022-10-20 DIAGNOSIS — Z51.81 ENCOUNTER FOR THERAPEUTIC DRUG MONITORING: ICD-10-CM

## 2022-10-20 DIAGNOSIS — I48.91 ATRIAL FIBRILLATION, UNSPECIFIED TYPE: ICD-10-CM

## 2022-10-20 LAB — INR PPP: 2.6

## 2022-10-20 NOTE — PROGRESS NOTES
Pt called result in by phone. Denies any medication changes or bleeding issues.  Pt instructed to continue current dosing schedule and can incorporate green veggies once a week if wanted.  Pt verbalized understanding. Pt will retest in 1 week. Patient instructed regarding medication; results given and questions answered. Nutritional counseling given.  Dietary factors affecting therapy addressed.  Patient instructed to monitor for excessive bruising or bleeding.  Findings reported by Jaqui Narvaez RN  Today's INR is Lab Results - Last 18 Months   Lab Units 10/20/22  0000   INR  2.60

## 2022-10-20 NOTE — PROGRESS NOTES
Pacemaker Evaluation Report    October 12, 2022    Primary Cardiologist: Dr. Myers  Implanting MD: Dr. Elam  :[employer] Model: [employer]ent DF RF 2210 Serial Number: [id number]  Implant date: 2/12/2014    Reason for evaluation:routine  PPM office  Cardiac device indication(s): sick sinus syndrome    Battery  REINA: 7.1 months    Interrogation Results  Atrial sensing: P wave: 4.3 mV  Atrial capture: A fib  Atrial lead impedance: 260 ohms  Ventricular sensing: R wave: 6.1 mV  Ventricular capture: 1.0 V @  0.5 ms  Ventricular lead impedance: right  600 ohms    Parameters  Mode: DDDR  Base Rate: 65/120    Diagnostic Data  Atrial paced: <1%   Ventricular paced: 44 %  Mode switch: 100%  AT/AF Matthews: 100% (Coumadin)  AHR: 1  VHR: 1 A-fib with RVR    Intrinsic rate: 78    Changes made: None    Conclusions: normal device function. Will follow remotes monthly.    Assessment:  1. SSS (sick sinus syndrome) (HCC)    2. Atrial fibrillation, unspecified type (HCC)    3. Pacemaker    - coumadin                  This document has been electronically signed by Jayda Myers MD on October 26, 2022 14:22 CDT

## 2022-10-26 PROCEDURE — 93288 INTERROG EVL PM/LDLS PM IP: CPT | Performed by: INTERNAL MEDICINE

## 2022-10-27 ENCOUNTER — ANTICOAGULATION VISIT (OUTPATIENT)
Dept: CARDIAC SURGERY | Facility: CLINIC | Age: 82
End: 2022-10-27

## 2022-10-27 ENCOUNTER — APPOINTMENT (OUTPATIENT)
Dept: PULMONOLOGY | Facility: HOSPITAL | Age: 82
End: 2022-10-27

## 2022-10-27 DIAGNOSIS — I48.91 ATRIAL FIBRILLATION, UNSPECIFIED TYPE: ICD-10-CM

## 2022-10-27 DIAGNOSIS — Z51.81 ENCOUNTER FOR THERAPEUTIC DRUG MONITORING: ICD-10-CM

## 2022-10-27 DIAGNOSIS — Z79.01 LONG TERM CURRENT USE OF ANTICOAGULANT THERAPY: Primary | ICD-10-CM

## 2022-10-27 LAB — INR PPP: 2.5

## 2022-10-27 NOTE — PROGRESS NOTES
Pt called to report INR from [employer] [facility] meter. Pt denied med changes or bleeding problems. Pt verified current dose. Pt instructed to continue current dose and recheck INR on Wednesday 11/9; pt repeated back correctly. Patient instructed regarding medication; results given and questions answered. Nutritional counseling given.  Dietary factors affecting therapy addressed.  Patient instructed to monitor for excessive bruising or bleeding. Findings reported by Annabelle Davis RN.    Today's INR is Lab Results - Last 18 Months   Lab Units 10/27/22  0000   INR  2.50

## 2022-11-08 ENCOUNTER — ANTICOAGULATION VISIT (OUTPATIENT)
Dept: CARDIAC SURGERY | Facility: CLINIC | Age: 82
End: 2022-11-08

## 2022-11-08 DIAGNOSIS — Z79.01 LONG TERM CURRENT USE OF ANTICOAGULANT THERAPY: Primary | ICD-10-CM

## 2022-11-08 DIAGNOSIS — I48.91 ATRIAL FIBRILLATION, UNSPECIFIED TYPE: ICD-10-CM

## 2022-11-08 DIAGNOSIS — Z51.81 ENCOUNTER FOR THERAPEUTIC DRUG MONITORING: ICD-10-CM

## 2022-11-08 LAB — INR PPP: 3.1

## 2022-11-08 NOTE — PROGRESS NOTES
I received INR via [employer].  I spoke to pt over the phone who states she has two or three more days of steroids left which CC did not know about.  I instructed pt to always notify CC of new medications; pt states she has been on steroids for over two weeks now.  I adjusted coumadin dose and instructed pt to have a salad today.  Instructed pt to self-test again on Tuesday, Nov 29th.  Pt verbalizes.    Findings reported by Baron Herrera RN.   Today's INR is Lab Results - Last 18 Months   Lab Units 11/08/22  0000   INR  3.10

## 2022-11-22 ENCOUNTER — APPOINTMENT (OUTPATIENT)
Dept: PULMONOLOGY | Facility: HOSPITAL | Age: 82
End: 2022-11-22

## 2022-11-28 RX ORDER — METOPROLOL SUCCINATE 50 MG/1
TABLET, EXTENDED RELEASE ORAL
Qty: 180 TABLET | Refills: 2 | Status: SHIPPED | OUTPATIENT
Start: 2022-11-28

## 2022-11-29 ENCOUNTER — APPOINTMENT (OUTPATIENT)
Dept: PULMONOLOGY | Facility: HOSPITAL | Age: 82
End: 2022-11-29

## 2022-11-29 ENCOUNTER — ANTICOAGULATION VISIT (OUTPATIENT)
Dept: CARDIAC SURGERY | Facility: CLINIC | Age: 82
End: 2022-11-29

## 2022-11-29 VITALS — HEART RATE: 78 BPM | OXYGEN SATURATION: 97 %

## 2022-11-29 DIAGNOSIS — Z79.01 LONG TERM CURRENT USE OF ANTICOAGULANT THERAPY: Primary | ICD-10-CM

## 2022-11-29 DIAGNOSIS — I48.91 ATRIAL FIBRILLATION, UNSPECIFIED TYPE: ICD-10-CM

## 2022-11-29 DIAGNOSIS — Z51.81 ENCOUNTER FOR THERAPEUTIC DRUG MONITORING: ICD-10-CM

## 2022-11-29 LAB — INR PPP: 2.2 (ref 0.9–1.1)

## 2022-11-29 PROCEDURE — 36416 COLLJ CAPILLARY BLOOD SPEC: CPT | Performed by: NURSE PRACTITIONER

## 2022-11-29 PROCEDURE — 85610 PROTHROMBIN TIME: CPT | Performed by: NURSE PRACTITIONER

## 2022-11-29 PROCEDURE — 93793 ANTICOAG MGMT PT WARFARIN: CPT | Performed by: NURSE PRACTITIONER

## 2022-11-29 NOTE — PROGRESS NOTES
Pt here today after running out of strips for her home meter. Pt denied med changes or bleeding problems. Pt instructed to continue current dose and to recheck INR at home on 12/27; pt verbalized. Patient instructed regarding medication; results given and questions answered. Nutritional counseling given.  Dietary factors affecting therapy addressed.  Patient instructed to monitor for excessive bruising or bleeding. Findings reported by Annabelle Davis RN.    Today's INR is Lab Results - Last 18 Months   Lab Units 11/29/22  0000   INR  2.20*

## 2022-12-01 ENCOUNTER — APPOINTMENT (OUTPATIENT)
Dept: PULMONOLOGY | Facility: HOSPITAL | Age: 82
End: 2022-12-01

## 2022-12-06 ENCOUNTER — APPOINTMENT (OUTPATIENT)
Dept: PULMONOLOGY | Facility: HOSPITAL | Age: 82
End: 2022-12-06

## 2022-12-08 ENCOUNTER — APPOINTMENT (OUTPATIENT)
Dept: PULMONOLOGY | Facility: HOSPITAL | Age: 82
End: 2022-12-08

## 2022-12-13 ENCOUNTER — APPOINTMENT (OUTPATIENT)
Dept: PULMONOLOGY | Facility: HOSPITAL | Age: 82
End: 2022-12-13

## 2022-12-15 ENCOUNTER — APPOINTMENT (OUTPATIENT)
Dept: PULMONOLOGY | Facility: HOSPITAL | Age: 82
End: 2022-12-15

## 2022-12-27 ENCOUNTER — ANTICOAGULATION VISIT (OUTPATIENT)
Dept: CARDIAC SURGERY | Facility: CLINIC | Age: 82
End: 2022-12-27

## 2022-12-27 DIAGNOSIS — I48.91 ATRIAL FIBRILLATION, UNSPECIFIED TYPE: ICD-10-CM

## 2022-12-27 DIAGNOSIS — Z79.01 LONG TERM CURRENT USE OF ANTICOAGULANT THERAPY: Primary | ICD-10-CM

## 2022-12-27 DIAGNOSIS — Z51.81 ENCOUNTER FOR THERAPEUTIC DRUG MONITORING: ICD-10-CM

## 2022-12-27 LAB — INR PPP: 2.6

## 2022-12-27 NOTE — PROGRESS NOTES
Pt called to report INR from [employer] meter. Pt denied med changes or bleeding problems. Pt verified current dose. Pt was instructed to continue current dose and to recheck INR on 1/24/23; pt repeated back correctly. Patient instructed regarding medication; results given and questions answered. Nutritional counseling given.  Dietary factors affecting therapy addressed.  Patient instructed to monitor for excessive bruising or bleeding. Findings reported by Annabelle Davis RN.    Today's INR is Lab Results - Last 18 Months   Lab Units 12/27/22  0000   INR  2.60

## 2023-01-03 ENCOUNTER — TELEPHONE (OUTPATIENT)
Dept: CARDIOLOGY | Facility: CLINIC | Age: 83
End: 2023-01-03
Payer: MEDICARE

## 2023-01-03 NOTE — TELEPHONE ENCOUNTER
Left message on home phone and cell phone stating no communication with home monitor to please call back.

## 2023-01-16 ENCOUNTER — TELEPHONE (OUTPATIENT)
Dept: CARDIOLOGY | Facility: CLINIC | Age: 83
End: 2023-01-16
Payer: MEDICARE

## 2023-01-16 NOTE — TELEPHONE ENCOUNTER
Patient continuing to have no communication with home monitor. Told patient to call [employer] service and she said she would.

## 2023-01-24 ENCOUNTER — ANTICOAGULATION VISIT (OUTPATIENT)
Dept: CARDIAC SURGERY | Facility: CLINIC | Age: 83
End: 2023-01-24
Payer: MEDICARE

## 2023-01-24 DIAGNOSIS — Z51.81 ENCOUNTER FOR THERAPEUTIC DRUG MONITORING: ICD-10-CM

## 2023-01-24 DIAGNOSIS — I48.91 ATRIAL FIBRILLATION, UNSPECIFIED TYPE: ICD-10-CM

## 2023-01-24 DIAGNOSIS — Z79.01 LONG TERM CURRENT USE OF ANTICOAGULANT THERAPY: Primary | ICD-10-CM

## 2023-01-24 LAB — INR PPP: 2.4

## 2023-01-24 NOTE — PROGRESS NOTES
I spoke to patient over the phone who self-tested today.  Pt states she started 5mg prednisone, two tablets daily on Jan 10th, and will go down to 1 pill daily on Feb 10th.  Since patient has been on steroids two weeks without interaction with warfarin, will have pt retest on Feb 21st.  Instructions verbalized.  Findings reported by Baron Herrera RN.   Today's INR is Lab Results - Last 18 Months   Lab Units 01/24/23  0000   INR  2.40

## 2023-01-25 ENCOUNTER — OFFICE VISIT (OUTPATIENT)
Dept: CARDIOLOGY | Facility: CLINIC | Age: 83
End: 2023-01-25
Payer: MEDICARE

## 2023-01-25 VITALS
BODY MASS INDEX: 25.7 KG/M2 | HEIGHT: 68 IN | OXYGEN SATURATION: 94 % | HEART RATE: 65 BPM | SYSTOLIC BLOOD PRESSURE: 118 MMHG | WEIGHT: 169.6 LBS | DIASTOLIC BLOOD PRESSURE: 70 MMHG

## 2023-01-25 DIAGNOSIS — I48.91 ATRIAL FIBRILLATION, UNSPECIFIED TYPE: Primary | ICD-10-CM

## 2023-01-25 PROCEDURE — 93000 ELECTROCARDIOGRAM COMPLETE: CPT | Performed by: INTERNAL MEDICINE

## 2023-01-25 PROCEDURE — 99214 OFFICE O/P EST MOD 30 MIN: CPT | Performed by: INTERNAL MEDICINE

## 2023-01-25 RX ORDER — PREDNISONE 1 MG/1
5 TABLET ORAL DAILY
COMMUNITY

## 2023-01-25 NOTE — PROGRESS NOTES
[facility] Cardiology  OFFICE NOTE    Cardiovascular Medicine  Jayda Myers M.D., RPVI         No referring provider defined for this encounter.    Thank you for asking me to see Kirsten Rosales for afib.    Shortness of Breath    Atrial Flutter    Atrial Fibrillation  Symptoms include shortness of breath. Past medical history includes atrial fibrillation.     This is a 83 y.o. female with:    1. Coronary artery disease involving native coronary artery of native heart with angina pectoris (CMS/HCC)    2. Mixed hyperlipidemia    3. Essential hypertension    4. Permanent atrial fibrillation (CMS/[facility])    5. Type 2 diabetes mellitus without complication, without long-term current use of insulin (CMS/[facility])          Chief complaint -Follow-up CAD        History of present Illness- 83 y.o.-year-old lady with history of coronary disease prior stent placement in 2005, has chronic atrial fibrillation with sick sinus syndrome on warfarin for anticoagulation.  Last cardiac cath was in 2016 which showed moderate disease in the RCA and left circumflex and patent stent in the ramus. She is doing well no chest pain or shortness of breath. she has her pacemaker checked regularly through the pacemaker clinic.  She denies any GI symptoms or CNS symptoms.     No acute issues since last visit Dr. Silver.  She occasionally has some fullness in her chest when she lays down at night however when she gets up and take a deep breath it resolves pain orthopnea or PND though.  She has been using 2 pillows.  No swelling  She has stable shortness of breath on exertion.  No chest pain.  Has been taking Coumadin without any bleeding problems.  Denying any palpitations.    01/25/2023:  No acute issues since last visit. Chronic SOB at baseline, reported her oxygen sats drop with exertion, I think she would benefit from ambulatory oxygen all the time.  She uses oxygen only at night currently.  Denying any chest pain.  No  bleeding problems.  Denying any palpitations.  No chest pain with exertion.    07/20/2022:  Admitted to the hospital in March with pneumonia.  Reported improvement in symptoms of shortness of breath since treatment for pneumonia.  Denying any chest pain.  Bleeding problems.    11/16/2020:  Patient reported over last several months has been having worsening orthopnea.  She was hemogram her care physician BNP was mildly elevated.  Denying any chest pain or palpitations.  She did have a fall in September.    02/03/2021:  No acute issues since last visit continues to have intermittent orthopnea.  She is taking Lasix with improvement in her symptoms.  Echocardiogram is normal LV systolic function.  No bleeding problems from Coumadin.    10/18/2021:  She was found to have severe sleep apnea and has been started on BiPAP with some improvement in her symptoms.    03/18/2022:  Reported worsening shortness of breath over the last few months, his known significant orthopnea from underlying sleep apnea which is stable at baseline.  No PND.  No lower extremity swelling.  Denying any chest pain.        Review of Systems - Constitution: Negative for weakness, weight gain and weight loss.   HENT: Negative for congestion.    Eyes: Negative for blurred vision.   Cardiovascular: As mentioned above  Respiratory: Negative for cough and hemoptysis.    Endocrine: Negative for polydipsia and polyuria.   Hematologic/Lymphatic: Negative for bleeding problem. Does not bruise/bleed easily.   Skin: Negative for flushing.   Musculoskeletal: Negative for neck pain and stiffness.   Gastrointestinal: Negative for abdominal pain, diarrhea, jaundice, melena, nausea and vomiting.   Genitourinary: Negative for dysuria and hematuria.   Neurological: Negative for dizziness, focal weakness and numbness.   Psychiatric/Behavioral: Negative for altered mental status and depression.     I reviewed the ROS as documented here and confirmed the accuracy of it  with the patient today. 1/25/2023        All other systems were reviewed and were negative.    family history is not on file.     reports that she has quit smoking. She has never used smokeless tobacco. She reports that she does not drink alcohol and does not use drugs.    Allergies   Allergen Reactions   • Aspirin Hives   • Celebrex [Celecoxib] Hives   • Rythmol [Propafenone] Nausea And Vomiting         Current Outpatient Medications:   •  Calcium-Magnesium-Vitamin D (CALCIUM MAGNESIUM PO), Take 1 Piece by mouth Daily. With vitamin d and zinc, Disp: , Rfl:   •  Coenzyme Q10 (COQ10 PO), Take 10 mg by mouth Daily., Disp: , Rfl:   •  Cyanocobalamin (Vitamin B12) 1000 MCG tablet controlled-release, Take 500 mcg by mouth Daily., Disp: , Rfl:   •  furosemide (LASIX) 20 MG tablet, Take 20 mg by mouth Daily., Disp: , Rfl:   •  isosorbide mononitrate (IMDUR) 30 MG 24 hr tablet, Take 1 tablet by mouth Daily., Disp: 90 tablet, Rfl: 3  •  levothyroxine (SYNTHROID, LEVOTHROID) 112 MCG tablet, Take 100 mcg by mouth Daily., Disp: , Rfl:   •  metFORMIN (GLUCOPHAGE) 500 MG tablet, Take 500 mg by mouth 2 (Two) Times a Day., Disp: , Rfl:   •  metoprolol succinate XL (TOPROL-XL) 50 MG 24 hr tablet, TAKE ONE TABLET BY MOUTH TWICE A DAY, Disp: 180 tablet, Rfl: 2  •  multivitamin with minerals tablet tablet, Take 1 tablet by mouth Daily., Disp: , Rfl:   •  O2 (OXYGEN), Inhale 2 L/min Continuous., Disp: , Rfl:   •  Omega-3 Fatty Acids (OMEGA 3 PO), Take 1,800 mg by mouth Daily., Disp: , Rfl:   •  pravastatin (PRAVACHOL) 40 MG tablet, Take 1 tablet by mouth Daily. (Patient taking differently: Take 40 mg by mouth. Three times weekly), Disp: 90 tablet, Rfl: 3  •  predniSONE (DELTASONE) 5 MG tablet, Take 5 mg by mouth 2 (Two) Times a Day., Disp: , Rfl:   •  traZODone (DESYREL) 50 MG tablet, Take 1-2 tabs nightly as needed for insomnia, Disp: 60 tablet, Rfl: 10  •  VITAMIN D, CHOLECALCIFEROL, PO, Take  by mouth., Disp: , Rfl:   •  warfarin  "(COUMADIN) 5 MG tablet, Take 1 tablet nightly or as directed, Disp: 30 tablet, Rfl: 5    Physical Exam:  Vitals:    01/25/23 1040   BP: 118/70   BP Location: Left arm   Patient Position: Sitting   Cuff Size: Adult   Pulse: 65   SpO2: 94%   Weight: 76.9 kg (169 lb 9.6 oz)   Height: 172.7 cm (68\")   PainSc: 0-No pain   PainLoc: Chest     Current Pain Level: none  Pulse Ox: Normal  on room air  General: alert, appears stated age and cooperative     Body Habitus: well-nourished    HEENT: Head: Normocephalic, no lesions, without obvious abnormality. No arcus senilis, xanthelasma or xanthomas.    Neuro: alert, oriented x3  Pulses: 2+ and symmetric  JVP: Volume/Pulsation: Normal.  Normal waveforms.   Appropriate inspiratory decrease.  No Kussmaul's. No Jaylin's.   Carotid Exam: no bruit normal pulsation bilaterally   Carotid Volume: normal.     Respirations: no increased work of breathing   Chest:  Normal    Pulmonary:Normal   Precordium: Normal impulses. P2 is not palpable.  RV Heave: absent  LV Heave: absent  [place]:  normal size and placement  Palpable S4: absent.  Heart rate: normal    Heart Rhythm: irregular     Heart Sounds: S1: normal  S2: normal  S3: absent   S4: absent  Opening Snap: absent    Pericardial Rub:  Absent: .    Abdomen:   Appearance: normal .  Palpation: Soft, non-tender to palpation, bowel sounds positive in all four quadrants; no guarding or rebound tenderness  Extremity: no edema.   LE Skin: no rashes  LE Hair:  normal  LE Pulses: well perfused with normal pulses in the distal extremities  Pallor on elevation: Absent. Rubor on dependency: None      DATA REVIEWED:     EKG. I personally reviewed and interpreted the EKG.  Atrial flutter with variable AV block and intermittent v paced rhythm    ECG/EMG Results (all)     None        ---------------------------------------------------  TTE/DEENA:  Results for orders placed during the hospital encounter of 03/25/22    Adult Transthoracic Echo Complete W/ " Cont if Necessary Per Protocol    Interpretation Summary  · Left ventricular wall thickness is consistent with mild concentric hypertrophy.  · Left ventricular ejection fraction appears to be 61 - 65%.  · Left ventricular diastolic function was indeterminate.  · The right atrial cavity is borderline dilated.  · Estimated right ventricular systolic pressure from tricuspid regurgitation is mildly elevated (35-45 mmHg).  · Left atrial volume is moderately increased.      --------------------------------------------------------------------------------------------------  LABS:     The CVD Risk score (Herbie et al., 2008) failed to calculate for the following reasons:    The 2008 CVD risk score is only valid for ages 30 to 74         Lab Results   Component Value Date    GLUCOSE 135 (H) 03/27/2022    BUN 20 01/10/2023    CREATININE 0.7 01/10/2023    EGFRIFNONA 70 08/09/2019    EGFRIFAFRI 97 06/08/2021    BCR 22.2 03/27/2022    K 4.6 01/10/2023    CO2 37 (H) 01/10/2023    CALCIUM 9.8 01/10/2023    ALBUMIN 4.3 01/10/2023    AST 56 (H) 01/10/2023    ALT 16 01/10/2023     Lab Results   Component Value Date    WBC 9.24 03/27/2022    HGB 13.8 03/27/2022    HCT 42.2 03/27/2022    MCV 88.1 03/27/2022     03/27/2022     Lab Results   Component Value Date    CHOL 165 06/13/2020    CHLPL 181 06/14/2022    TRIG 192 (H) 06/14/2022    HDL 49 06/14/2022    LDL 94 06/14/2022     Lab Results   Component Value Date    TSH 0.57 06/14/2022     Lab Results   Component Value Date    CKTOTAL 51 02/10/2014    CKMB 1.0 02/10/2014    TROPONINI <0.012 02/10/2014    TROPONINT <0.010 03/25/2022     Lab Results   Component Value Date    HGBA1C 6.5 (H) 01/10/2023     No results found for: DDIMER  Lab Results   Component Value Date    ALT 16 01/10/2023     Lab Results   Component Value Date    HGBA1C 6.5 (H) 01/10/2023    HGBA1C 6.7 (H) 06/14/2022    HGBA1C 6.9 (H) 12/07/2021     Lab Results   Component Value Date    CREATININE 0.7  01/10/2023     No results found for: IRON, TIBC, FERRITIN  Lab Results   Component Value Date    INR 2.40 01/24/2023    INR 2.60 12/27/2022    INR 2.20 (A) 11/29/2022    PROTIME 22.8 (H) 03/28/2022    PROTIME 25.2 (H) 03/27/2022    PROTIME 24.6 (H) 03/26/2022       Assessment/Plan     CAD prior stent placement doing well no chest pain.  She takes Imdur for mild angina and she is doing tolerating the isosorbide 30 mg daily, I asked her to exercise regularly  Allergic to aspirin  On metoprolol XL.  Recent stress test without significant ischemia.    Chronic atrial fibrillation/flutter with sick sinus syndrome status post pacemaker on warfarin and rate controlled with Toprol-XL.  I discussed options of atrial flutter ablation and Novacks however patient wants to continue medical therapy and Coumadin at this point.  Echo 03/22 with EF of 60 to 65% %. LA was moderately dilated.   Continue metoprolol XL and warfarin.      Hyperlipidemia on pravastatin      Diabetes on metformin and A1c is good.     Hypothyroidism on Synthroid supplements    Shortness of breath:   Repeat echo March 2022 showed preserved LV systolic function.  Diastolic function was indeterminate.  RVSP was mildly elevated.  Left atrium was moderately increased.  Her symptoms are predominantly when she is sleeping bedtime.  She has been seen by pulmonary has been started on inhalers.    Also has been diagnosed with severe sleep apnea.  Stress test had shown medium size infarct anterior lateral wall with no significant ischemia.  We did discuss option of cardiac cath with her however symptoms are improved and wants to hold off on cath for now.  BNP was normal.  Established with pulmonary at [place].    We did talk about consideration for ablation/cardioversion for A. fib with patient wants to hold off for now.  CBC with normal hemoglobin              Prevention:  Patient's Body mass index is 25.79 kg/m². BMI is above normal parameters. Recommendations  include: exercise counseling and nutrition counseling.      Kirsten Rosales  reports that she has quit smoking. She has never used smokeless tobacco..    AAA Screening:   Not needed            This document has been electronically signed by Jayda Myers MD on January 25, 2023 10:57 CST

## 2023-02-02 LAB
QT INTERVAL: 358 MS
QTC INTERVAL: 518 MS

## 2023-02-21 ENCOUNTER — ANTICOAGULATION VISIT (OUTPATIENT)
Dept: CARDIAC SURGERY | Facility: CLINIC | Age: 83
End: 2023-02-21
Payer: MEDICARE

## 2023-02-21 DIAGNOSIS — I48.91 ATRIAL FIBRILLATION, UNSPECIFIED TYPE: ICD-10-CM

## 2023-02-21 DIAGNOSIS — Z51.81 ENCOUNTER FOR THERAPEUTIC DRUG MONITORING: ICD-10-CM

## 2023-02-21 DIAGNOSIS — Z79.01 LONG TERM CURRENT USE OF ANTICOAGULANT THERAPY: Primary | ICD-10-CM

## 2023-02-21 LAB — INR PPP: 3.4

## 2023-02-21 NOTE — PROGRESS NOTES
Pt called to report INR from [email or web address] [facility] meter. Pt continues prednisone 5mg daily. Pt denied bleeding problems. Pt was instructed on dosing, to eat a serving of green veggies today, and recheck INR on Tuesday 2/28; pt repeated all instructions back correctly. Patient instructed regarding medication; results given and questions answered. Nutritional counseling given.  Dietary factors affecting therapy addressed.  Patient instructed to monitor for excessive bruising or bleeding. Findings reported by Annabelle Davis RN.    Today's INR is Lab Results - Last 18 Months   Lab Units 02/21/23  0000   INR  3.40

## 2023-02-28 ENCOUNTER — ANTICOAGULATION VISIT (OUTPATIENT)
Dept: CARDIAC SURGERY | Facility: CLINIC | Age: 83
End: 2023-02-28
Payer: MEDICARE

## 2023-02-28 DIAGNOSIS — Z79.01 LONG TERM CURRENT USE OF ANTICOAGULANT THERAPY: Primary | ICD-10-CM

## 2023-02-28 DIAGNOSIS — Z51.81 ENCOUNTER FOR THERAPEUTIC DRUG MONITORING: ICD-10-CM

## 2023-02-28 DIAGNOSIS — I48.91 ATRIAL FIBRILLATION, UNSPECIFIED TYPE: ICD-10-CM

## 2023-02-28 LAB — INR PPP: 2.7

## 2023-02-28 NOTE — PROGRESS NOTES
I received INR from [employer] home monitoring service.  I spoke to pt over the phone.  Pt states she continue Prednisone 5mg daily through April.  I verified warfarin dose and instructed pt continue the same.  Instructed pt to self-test again on Tuesday, March 7th.  Instructions verbalized.  Findings reported by Baron Herrera RN.   Today's INR is Lab Results - Last 18 Months   Lab Units 02/28/23  0000   INR  2.70

## 2023-03-07 ENCOUNTER — ANTICOAGULATION VISIT (OUTPATIENT)
Dept: CARDIAC SURGERY | Facility: CLINIC | Age: 83
End: 2023-03-07
Payer: MEDICARE

## 2023-03-07 DIAGNOSIS — I48.91 ATRIAL FIBRILLATION, UNSPECIFIED TYPE: ICD-10-CM

## 2023-03-07 DIAGNOSIS — Z51.81 ENCOUNTER FOR THERAPEUTIC DRUG MONITORING: ICD-10-CM

## 2023-03-07 DIAGNOSIS — Z79.01 LONG TERM CURRENT USE OF ANTICOAGULANT THERAPY: Primary | ICD-10-CM

## 2023-03-07 LAB — INR PPP: 2.4

## 2023-03-21 ENCOUNTER — ANTICOAGULATION VISIT (OUTPATIENT)
Dept: CARDIAC SURGERY | Facility: CLINIC | Age: 83
End: 2023-03-21
Payer: MEDICARE

## 2023-03-21 VITALS — HEART RATE: 84 BPM | OXYGEN SATURATION: 93 %

## 2023-03-21 DIAGNOSIS — I48.91 ATRIAL FIBRILLATION, UNSPECIFIED TYPE: ICD-10-CM

## 2023-03-21 DIAGNOSIS — Z79.01 LONG TERM CURRENT USE OF ANTICOAGULANT THERAPY: Primary | ICD-10-CM

## 2023-03-21 DIAGNOSIS — Z51.81 ENCOUNTER FOR THERAPEUTIC DRUG MONITORING: ICD-10-CM

## 2023-03-21 LAB — INR PPP: 2.1 (ref 0.9–1.1)

## 2023-03-21 PROCEDURE — 36416 COLLJ CAPILLARY BLOOD SPEC: CPT | Performed by: NURSE PRACTITIONER

## 2023-03-21 PROCEDURE — 85610 PROTHROMBIN TIME: CPT | Performed by: NURSE PRACTITIONER

## 2023-03-21 PROCEDURE — 93793 ANTICOAG MGMT PT WARFARIN: CPT | Performed by: NURSE PRACTITIONER

## 2023-03-21 NOTE — PROGRESS NOTES
Pt here for yearly face to face visit with meter comparison. Pt [employer] [facility] meter read 2.2 and our meter read 2.1. Pt continues Prednisone through 4/11. Pt denied other med changes or bleeding problems. Pt was isntructed to continue current dose and recheck INR on 4/18 or 1 week after stopping Prednisone; pt verbalized. Patient instructed regarding medication; results given and questions answered. Nutritional counseling given.  Dietary factors affecting therapy addressed.  Patient instructed to monitor for excessive bruising or bleeding. .  Findings reported by Annabelle Davis RN.   Today's INR is Lab Results - Last 18 Months   Lab Units 03/21/23  0000   INR  2.10*

## 2023-04-18 ENCOUNTER — ANTICOAGULATION VISIT (OUTPATIENT)
Dept: CARDIAC SURGERY | Facility: CLINIC | Age: 83
End: 2023-04-18
Payer: MEDICARE

## 2023-04-18 DIAGNOSIS — I48.91 ATRIAL FIBRILLATION, UNSPECIFIED TYPE: ICD-10-CM

## 2023-04-18 DIAGNOSIS — Z79.01 LONG TERM CURRENT USE OF ANTICOAGULANT THERAPY: Primary | ICD-10-CM

## 2023-04-18 DIAGNOSIS — Z51.81 ENCOUNTER FOR THERAPEUTIC DRUG MONITORING: ICD-10-CM

## 2023-04-18 LAB — INR PPP: 2

## 2023-04-18 PROCEDURE — 93296 REM INTERROG EVL PM/IDS: CPT | Performed by: INTERNAL MEDICINE

## 2023-04-18 PROCEDURE — 93294 REM INTERROG EVL PM/LDLS PM: CPT | Performed by: INTERNAL MEDICINE

## 2023-04-18 NOTE — PROGRESS NOTES
Pt called to report INR from [employer] meter. Pt denied med changes or bleeding problems. Pt verified current dose. Pt was instructed to continue current dose and recheck INR on 5/16; she repeated back correctly. Patient instructed regarding medication; results given and questions answered. Nutritional counseling given.  Dietary factors affecting therapy addressed.  Patient instructed to monitor for excessive bruising or bleeding. Findings reported by Annabelle Davis RN.    Today's INR is Lab Results - Last 18 Months   Lab Units 04/18/23  0000   INR  2.00

## 2023-05-16 ENCOUNTER — ANTICOAGULATION VISIT (OUTPATIENT)
Dept: CARDIAC SURGERY | Facility: CLINIC | Age: 83
End: 2023-05-16
Payer: MEDICARE

## 2023-05-16 DIAGNOSIS — Z51.81 ENCOUNTER FOR THERAPEUTIC DRUG MONITORING: ICD-10-CM

## 2023-05-16 DIAGNOSIS — Z79.01 LONG TERM CURRENT USE OF ANTICOAGULANT THERAPY: Primary | ICD-10-CM

## 2023-05-16 DIAGNOSIS — I48.91 ATRIAL FIBRILLATION, UNSPECIFIED TYPE: ICD-10-CM

## 2023-05-16 LAB — INR PPP: 2.1

## 2023-05-16 NOTE — PROGRESS NOTES
Pt called to report INR from [employer] [facility]. Pt is now taking Lisinopril and denied bleeding problems. Pt versified current dose. Pt was instructed to continue current dose and recheck INR on June 6; pt repeated back correctly. Patient instructed regarding medication; results given and questions answered. Nutritional counseling given.  Dietary factors affecting therapy addressed.  Patient instructed to monitor for excessive bruising or bleeding. Findings reported by Annabelle Davis RN.    Today's INR is Lab Results - Last 18 Months   Lab Units 05/16/23  0000   INR  2.10          
none

## 2023-06-06 ENCOUNTER — ANTICOAGULATION VISIT (OUTPATIENT)
Dept: CARDIAC SURGERY | Facility: CLINIC | Age: 83
End: 2023-06-06
Payer: MEDICARE

## 2023-06-06 DIAGNOSIS — Z51.81 ENCOUNTER FOR THERAPEUTIC DRUG MONITORING: ICD-10-CM

## 2023-06-06 DIAGNOSIS — Z79.01 LONG TERM CURRENT USE OF ANTICOAGULANT THERAPY: Primary | ICD-10-CM

## 2023-06-06 DIAGNOSIS — I48.91 ATRIAL FIBRILLATION, UNSPECIFIED TYPE: ICD-10-CM

## 2023-06-06 LAB — INR PPP: 2.3

## 2023-06-06 NOTE — PROGRESS NOTES
I spoke to pt over the phone who self-tested today.  Pt denies med changes or bleeding issues.  I verified warfarin dose and instructed pt continue the same.  Instructed pt to self-test again on Thursday, July 6th.  Instructions verbalized.  Findings reported by Baron Herrera RN.   Today's INR is   Lab Results - Last 18 Months   Lab Units 06/06/23  0000   INR  2.30

## 2023-06-19 RX ORDER — TRAZODONE HYDROCHLORIDE 50 MG/1
TABLET ORAL
Qty: 60 TABLET | Refills: 0 | Status: SHIPPED | OUTPATIENT
Start: 2023-06-19 | End: 2023-06-21 | Stop reason: SDUPTHER

## 2023-06-20 PROBLEM — T82.111A COMPLICATIONS, MECHANICAL, PACEMAKER, CARDIAC: Status: ACTIVE | Noted: 2023-06-20

## 2023-06-20 NOTE — H&P (VIEW-ONLY)
Kirsten Rosales  83 y.o. female    06/20/2023  CAD  Hyperlipidemia  Sick sinus syndrome  Chronic atrial fibrillation    History of Present Illness:  83 years old patient with a history of CAD s/p PCI in 25, sick sinus syndrome syndrome with chronic atrial fibrillation on long-term oral anticoagulation with warfarin tolerating very well.  She was referred for pacemaker generator replacement and is on recent irrigation pacemaker reached REINA status.  Patient denies orthopnea PND chest pain lightheaded dizziness no intermittent cardiac issue reported.  No fever cough or chills.  Last cardiac cath was 2016 which revealed moderate disease in RCA and left circumflex system and patent stent in ramus.    She has been using 2 pillows.  No swelling  She has stable shortness of breath on exertion.  No chest pain.  Has been taking Coumadin without any bleeding problems.  Denying any palpitations.     01/25/2023:  No acute issues since last visit. Chronic SOB at baseline, reported her oxygen sats drop with exertion, I think she would benefit from ambulatory oxygen all the time.  She uses oxygen only at night currently.  Denying any chest pain.  No bleeding problems.  Denying any palpitations.  No chest pain with exertion.     07/20/2022:  Admitted to the hospital in March with pneumonia.  Reported improvement in symptoms of shortness of breath since treatment for pneumonia.  Denying any chest pain.    Test test March 2023      Findings consistent with an equivocal ECG stress test.  Left ventricular ejection fraction is normal. (Calculated EF = 61%).  Myocardial perfusion imaging indicates a medium-sized infarct located in the anterior wall, lateral wall and apex with no significant ischemia noted.  No sig ischemia is noted.  Cannot exclude arifact from breast tissue affecting study.  March 2023      Left ventricular wall thickness is consistent with mild concentric hypertrophy.  Left ventricular ejection fraction appears to  be 61 - 65%.  Left ventricular diastolic function was indeterminate.  The right atrial cavity is borderline dilated.  Estimated right ventricular systolic pressure from tricuspid regurgitation is mildly elevated (35-45 mmHg).  Left atrial volume is moderately increased.       SUBJECTIVE:    Allergies   Allergen Reactions    Aspirin Hives    Celebrex [Celecoxib] Hives    Rythmol [Propafenone] Nausea And Vomiting       Past Medical History:   Diagnosis Date    Atherosclerotic heart disease of native coronary artery with unspecified angina pectoris     Atrial fibrillation     Breath shortness     CAD (coronary artery disease)     Hyperlipidemia     Long term (current) use of anticoagulants     Sick sinus syndrome     Unspecified hypothyroidism        Past Surgical History:   Procedure Laterality Date    BACK SURGERY      CARDIAC CATHETERIZATION      2/2016    CARDIAC PACEMAKER PLACEMENT      CARDIOVERSION      CATARACT EXTRACTION W/ INTRAOCULAR LENS IMPLANT Right 3/16/2018    Procedure: CATARACT PHACO EXTRACTION WITH INTRAOCULAR LENS IMPLANT;  Surgeon: Umesh Thibodeaux MD;  Location: [facility] OR;  Service: Ophthalmology    CATARACT EXTRACTION W/ INTRAOCULAR LENS IMPLANT Left 3/23/2018    Procedure: CATARACT PHACO EXTRACTION WITH INTRAOCULAR LENS IMPLANT;  Surgeon: Umesh Thibodeaux MD;  Location: [facility] OR;  Service: Ophthalmology    COLONOSCOPY N/A 1/4/2018    Procedure: COLONOSCOPY;  Surgeon: Albino Juan DO;  Location: [facility] ENDOSCOPY;  Service:     COLONOSCOPY N/A 6/15/2021    Procedure: COLONOSCOPY;  Surgeon: Albino Juan DO;  Location: [facility] ENDOSCOPY;  Service: Gastroenterology;  Laterality: N/A;    CORONARY ANGIOPLASTY      PACEMAKER IMPLANTATION         History reviewed. No pertinent family history.    Social History     Socioeconomic History    Marital status:    Tobacco Use    Smoking status: Former    Smokeless tobacco: Never    Tobacco comments:     man 30 years ago   Substance  and Sexual Activity    Alcohol use: No    Drug use: No    Sexual activity: Defer       Current Outpatient Medications   Medication Sig Dispense Refill    Calcium-Magnesium-Vitamin D (CALCIUM MAGNESIUM PO) Take 1 Piece by mouth Daily. With vitamin d and zinc      Coenzyme Q10 (COQ10 PO) Take 10 mg by mouth Daily.      Cyanocobalamin (Vitamin B12) 1000 MCG tablet controlled-release Take 500 mcg by mouth Daily.      furosemide (LASIX) 20 MG tablet Take 1 tablet by mouth Daily.      isosorbide mononitrate (IMDUR) 30 MG 24 hr tablet Take 1 tablet by mouth Daily. 90 tablet 3    levothyroxine (SYNTHROID, LEVOTHROID) 112 MCG tablet Take 100 mcg by mouth Daily.      lisinopril (PRINIVIL,ZESTRIL) 5 MG tablet Take 1 tablet by mouth Daily. 30 tablet 11    metFORMIN (GLUCOPHAGE) 500 MG tablet Take 1 tablet by mouth 2 (Two) Times a Day.      metoprolol succinate XL (TOPROL-XL) 50 MG 24 hr tablet TAKE ONE TABLET BY MOUTH TWICE A  tablet 2    O2 (OXYGEN) Inhale 2 L/min Continuous.      Omega-3 Fatty Acids (OMEGA 3 PO) Take 1,800 mg by mouth Daily.      pravastatin (PRAVACHOL) 40 MG tablet Take 1 tablet by mouth Daily. (Patient taking differently: Take 1 tablet by mouth. Three times weekly) 90 tablet 3    traZODone (DESYREL) 50 MG tablet TAKE ONE TO TWO TABLETS BY MOUTH NIGHTLY AS NEEDED FOR INSOMNIA 60 tablet 0    VITAMIN D, CHOLECALCIFEROL, PO Take  by mouth.      warfarin (COUMADIN) 5 MG tablet Take 1 tablet nightly or as directed 30 tablet 5    predniSONE (DELTASONE) 5 MG tablet Take 1 tablet by mouth Daily.       No current facility-administered medications for this visit.       Review of Systems:   Constitutional:  no change in exercise tolerance.  HENT: Denies any hearing loss, epistaxis  Eyes: No blurred  Respiratory:  Denies dyspnea with exertion,no cough or wheezing  Cardiovascular: See H&P  Gastrointestinal:  Denies change in bowel habits, dyspepsia, ulcer disease  Endocrine: Negative for cold intolerance, heat  "intolerance, polydipsia, polyphagia  Genitourinary: Negative.  Musculoskeletal: Denies any history of arthritic symptoms or back problems.   Skin:  Denies rashes or skin lesions.   Allergic/Immunologic: Negative.  Negative for environmental allergies  Neurological:  Denies any history of recurrent headaches   Hematological: No history of easy bruisability  Psychiatric/Behavioral: Denies any history of depression    OBJECTIVE:    /72 (BP Location: Left arm, Patient Position: Sitting, Cuff Size: Adult)   Pulse 65   Ht 172.7 cm (68\")   Wt 77.1 kg (169 lb 14.4 oz)   SpO2 98%   BMI 25.83 kg/m²     Physical Exam:   Constitutional: Cooperative, alert and oriented, well-developed, well-nourished, in no acute distress.   Head: Normocephalic, conjunctive is pink, thyroid is nonpalpable, no jugular is distention  Cardiovascular regular rate/rhythm, no S3, no pericardial rub  Pulmonary/Chest: Chest positive bilateral, good air entry, no rales, no wheezing  Abdominal: Abdomen soft, bowel sounds normoactive  Musculoskeletal: No deformities, positive peripheral pulses  Neurological: No gross motor or sensory deficits noted, affect appropriate.   Skin: Warm and dry to the touch, no apparent skin lesions or masses noted.   Psychiatric: Normal mood and affect. Behavior is normal    Procedures    Lab Results   Component Value Date    WBC 9.24 03/27/2022    HGB 13.8 03/27/2022    HCT 42.2 03/27/2022    MCV 88.1 03/27/2022     03/27/2022     Lab Results   Component Value Date    GLUCOSE 135 (H) 03/27/2022    BUN 18 04/27/2023    CREATININE 0.8 04/27/2023    EGFRIFNONA 70 08/09/2019    EGFRIFAFRI 97 06/08/2021    BCR 22.2 03/27/2022    CO2 31 04/27/2023    CALCIUM 9.7 04/27/2023    ALBUMIN 4.0 04/27/2023    AST 57 (H) 04/27/2023    ALT 14 04/27/2023     Lab Results   Component Value Date    CHOL 165 06/13/2020    CHOL 135 08/09/2019    CHOL 152 07/06/2018     Lab Results   Component Value Date    TRIG 214 (H) 04/27/2023 "    TRIG 192 (H) 06/14/2022    TRIG 207 (H) 06/08/2021     Lab Results   Component Value Date    HDL 47 04/27/2023    HDL 49 06/14/2022    HDL 41 06/08/2021     No components found for: LDLCALC  Lab Results   Component Value Date    LDL 81 04/27/2023    LDL 94 06/14/2022    LDL 97 06/08/2021     No results found for: HDLLDLRATIO  No components found for: CHOLHDL  Lab Results   Component Value Date    HGBA1C 6.8 (H) 04/27/2023     Lab Results   Component Value Date    TSH 0.88 04/27/2023           ASSESSMENT AND PLAN:  CAD prior stent placement doing well no chest pain.  She takes Imdur for mild angina and she is doing tolerating the isosorbide 30 mg daily, I asked her to exercise regularly  Allergic to aspirin  On metoprolol XL.  Recent stress test without significant ischemia.     Sick sinus syndrome  S/p pacemaker reached REINA status.  Patient referred for pacemaker generator replacement.  Procedure risk pros and cons discussed.  Risk included but not limited to infection, bleeding, stroke, pneumothorax, hematoma.  She is a Mallampati score 2 and ASA class II.  Understand willing to proceed forward.    Chronic atrial fibrillation/atrial flutter.  He is on rate controlling with metoprolol and feeling fine     Hyperlipidemia on pravastatin      Diabetes on metformin and A1c is good.    I spent 28 minutes caring for Kirsten on this date of service. This time includes time spent by me of counseling/coordination of care as relates to the presenting problem and any ordered procedures/tests as outlined above.          This document has been electronically signed by Thu Elam MD on June 20, 2023 08:56 CDT      Diagnoses and all orders for this visit:    1. Coronary artery disease involving native coronary artery of native heart with angina pectoris (Primary)    2. Mixed hyperlipidemia    3. Essential hypertension    4. SSS (sick sinus syndrome)    5. Atrial fibrillation, unspecified type          Thu Elam  MD  6/20/2023  08:36 CDT

## 2023-07-07 ENCOUNTER — HOME HEALTH ADMISSION (OUTPATIENT)
Dept: HOME HEALTH SERVICES | Facility: HOME HEALTHCARE | Age: 83
End: 2023-07-07
Payer: MEDICARE

## 2023-07-20 ENCOUNTER — HOSPITAL ENCOUNTER (OUTPATIENT)
Facility: HOSPITAL | Age: 83
Setting detail: HOSPITAL OUTPATIENT SURGERY
Discharge: HOME | End: 2023-07-20
Attending: INTERNAL MEDICINE | Admitting: INTERNAL MEDICINE
Payer: MEDICARE

## 2023-07-20 VITALS
TEMPERATURE: 96.8 F | OXYGEN SATURATION: 95 % | RESPIRATION RATE: 18 BRPM | HEIGHT: 68 IN | WEIGHT: 164.68 LBS | SYSTOLIC BLOOD PRESSURE: 130 MMHG | HEART RATE: 67 BPM | DIASTOLIC BLOOD PRESSURE: 72 MMHG | BODY MASS INDEX: 24.96 KG/M2

## 2023-07-20 DIAGNOSIS — T82.111D MALFUNCTION OF CARDIAC PACEMAKER, SUBSEQUENT ENCOUNTER: ICD-10-CM

## 2023-07-20 LAB
ANION GAP SERPL CALCULATED.3IONS-SCNC: 11 MMOL/L (ref 5–15)
BUN SERPL-MCNC: 21 MG/DL (ref 8–23)
BUN/CREAT SERPL: 29.2 (ref 7–25)
CALCIUM SPEC-SCNC: 9.2 MG/DL (ref 8.6–10.5)
CHLORIDE SERPL-SCNC: 99 MMOL/L (ref 98–107)
CO2 SERPL-SCNC: 30 MMOL/L (ref 22–29)
CREAT SERPL-MCNC: 0.72 MG/DL (ref 0.57–1)
DEPRECATED RDW RBC AUTO: 43.4 FL (ref 37–54)
EGFRCR SERPLBLD CKD-EPI 2021: 82.1 ML/MIN/1.73
ERYTHROCYTE [DISTWIDTH] IN BLOOD BY AUTOMATED COUNT: 13.3 % (ref 12.3–15.4)
GLUCOSE SERPL-MCNC: 118 MG/DL (ref 65–99)
HCT VFR BLD AUTO: 40.7 % (ref 34–46.6)
HGB BLD-MCNC: 13.4 G/DL (ref 12–15.9)
INR PPP: 1.66 (ref 0.8–1.2)
MCH RBC QN AUTO: 29.7 PG (ref 26.6–33)
MCHC RBC AUTO-ENTMCNC: 32.9 G/DL (ref 31.5–35.7)
MCV RBC AUTO: 90.2 FL (ref 79–97)
PLATELET # BLD AUTO: 305 10*3/MM3 (ref 140–450)
PMV BLD AUTO: 10.2 FL (ref 6–12)
POTASSIUM SERPL-SCNC: 4.3 MMOL/L (ref 3.5–5.2)
PROTHROMBIN TIME: 19.4 SECONDS (ref 11.1–15.3)
RBC # BLD AUTO: 4.51 10*6/MM3 (ref 3.77–5.28)
SODIUM SERPL-SCNC: 140 MMOL/L (ref 136–145)
WBC NRBC COR # BLD: 9.45 10*3/MM3 (ref 3.4–10.8)

## 2023-07-20 PROCEDURE — 25010000002 MIDAZOLAM PER 1 MG: Performed by: INTERNAL MEDICINE

## 2023-07-20 PROCEDURE — 33228 REMV&REPLC PM GEN DUAL LEAD: CPT | Performed by: INTERNAL MEDICINE

## 2023-07-20 PROCEDURE — 85610 PROTHROMBIN TIME: CPT | Performed by: INTERNAL MEDICINE

## 2023-07-20 PROCEDURE — 25010000002 FENTANYL CITRATE (PF) 50 MCG/ML SOLUTION: Performed by: INTERNAL MEDICINE

## 2023-07-20 PROCEDURE — C1785 PMKR, DUAL, RATE-RESP: HCPCS | Performed by: INTERNAL MEDICINE

## 2023-07-20 PROCEDURE — 25010000002 GENTAMICIN PER 80 MG: Performed by: INTERNAL MEDICINE

## 2023-07-20 PROCEDURE — 80048 BASIC METABOLIC PNL TOTAL CA: CPT | Performed by: INTERNAL MEDICINE

## 2023-07-20 PROCEDURE — 85027 COMPLETE CBC AUTOMATED: CPT | Performed by: INTERNAL MEDICINE

## 2023-07-20 DEVICE — GEN PM ASSURITY MRI DR RF PM2272: Type: IMPLANTABLE DEVICE | Status: FUNCTIONAL

## 2023-07-20 RX ORDER — SODIUM CHLORIDE 9 MG/ML
50 INJECTION, SOLUTION INTRAVENOUS CONTINUOUS
Status: DISCONTINUED | OUTPATIENT
Start: 2023-07-20 | End: 2023-07-20 | Stop reason: HOSPADM

## 2023-07-20 RX ORDER — MIDAZOLAM HYDROCHLORIDE 1 MG/ML
INJECTION INTRAMUSCULAR; INTRAVENOUS
Status: DISCONTINUED | OUTPATIENT
Start: 2023-07-20 | End: 2023-07-20 | Stop reason: HOSPADM

## 2023-07-20 RX ORDER — BUPIVACAINE HCL/0.9 % NACL/PF 0.1 %
2 PLASTIC BAG, INJECTION (ML) EPIDURAL ONCE
Status: COMPLETED | OUTPATIENT
Start: 2023-07-20 | End: 2023-07-20

## 2023-07-20 RX ORDER — FENTANYL CITRATE 50 UG/ML
INJECTION, SOLUTION INTRAMUSCULAR; INTRAVENOUS
Status: DISCONTINUED | OUTPATIENT
Start: 2023-07-20 | End: 2023-07-20 | Stop reason: HOSPADM

## 2023-07-20 RX ORDER — SODIUM CHLORIDE 0.9 % (FLUSH) 0.9 %
10 SYRINGE (ML) INJECTION AS NEEDED
Status: DISCONTINUED | OUTPATIENT
Start: 2023-07-20 | End: 2023-07-20 | Stop reason: HOSPADM

## 2023-07-20 RX ORDER — SODIUM CHLORIDE 0.9 % (FLUSH) 0.9 %
3 SYRINGE (ML) INJECTION EVERY 12 HOURS SCHEDULED
Status: DISCONTINUED | OUTPATIENT
Start: 2023-07-20 | End: 2023-07-20 | Stop reason: HOSPADM

## 2023-07-20 RX ORDER — LIDOCAINE HYDROCHLORIDE 20 MG/ML
INJECTION, SOLUTION INFILTRATION; PERINEURAL
Status: DISCONTINUED | OUTPATIENT
Start: 2023-07-20 | End: 2023-07-20 | Stop reason: HOSPADM

## 2023-07-20 RX ORDER — SODIUM CHLORIDE 9 MG/ML
40 INJECTION, SOLUTION INTRAVENOUS AS NEEDED
Status: DISCONTINUED | OUTPATIENT
Start: 2023-07-20 | End: 2023-07-20 | Stop reason: HOSPADM

## 2023-07-20 RX ADMIN — Medication 2 G: at 08:25

## 2023-07-20 RX ADMIN — GENTAMICIN SULFATE 60 MG: 40 INJECTION, SOLUTION INTRAMUSCULAR; INTRAVENOUS at 08:57

## 2023-07-20 RX ADMIN — SODIUM CHLORIDE 50 ML/HR: 9 INJECTION, SOLUTION INTRAVENOUS at 06:58

## 2023-07-20 NOTE — DISCHARGE INSTRUCTIONS
Keep it dry for 1 week  Wound checkup and pacer clinic on Monday and then in 1 week  Resume warfarin from tomorrow  Do not lift left arm above the shoulder and driving for 30 days

## 2023-07-20 NOTE — INTERVAL H&P NOTE
H&P reviewed. The patient was examined and there are no changes to the H&P.      Temp:  [98 °F (36.7 °C)] 98 °F (36.7 °C)  Heart Rate:  [70] 70  Resp:  [18] 18  BP: (114)/(69) 114/69

## 2023-07-24 ENCOUNTER — CLINICAL SUPPORT (OUTPATIENT)
Dept: CARDIOLOGY | Facility: CLINIC | Age: 83
End: 2023-07-24
Payer: MEDICARE

## 2023-07-24 DIAGNOSIS — Z95.0 PACEMAKER: Primary | ICD-10-CM

## 2023-07-25 ENCOUNTER — HOME CARE VISIT (OUTPATIENT)
Dept: HOME HEALTH SERVICES | Facility: CLINIC | Age: 83
End: 2023-07-25
Payer: MEDICARE

## 2023-07-25 VITALS
SYSTOLIC BLOOD PRESSURE: 100 MMHG | DIASTOLIC BLOOD PRESSURE: 54 MMHG | OXYGEN SATURATION: 93 % | HEART RATE: 71 BPM | RESPIRATION RATE: 18 BRPM | TEMPERATURE: 97.3 F

## 2023-07-25 PROCEDURE — G0158 HHC OT ASSISTANT EA 15: HCPCS

## 2023-07-25 NOTE — CASE COMMUNICATION
Patient D/C from OT on this date.  All goals met.  Had new pacemaker put in last week and has ROM restrictions L shoulder.  Demo ability to complete ADLs.  Has family in<>out daily to assist if needed.      Phan WILKINS  7/25/23

## 2023-07-25 NOTE — Clinical Note
OCCUPATIONAL THERAPY DISCHARGE:    PRIOR VS CURRENT LEVEL   At Eval  Ambulation: contact guard assist, stand by assist   Ambulation devices: RW   Bathing: contact guard assist   Toileting: contact guard assist, stand by assist   Upper body dressing: supervision   Lower body dressing: contact guard assist, stand by assist   Feeding: independent   Grooming: supervision   Physical transfers: contact guard assist      At D/C - Patient participated in OT interventions for safety/fall risky, UE strengthening, standing endurance, and T/F independence.  Patient demo increased safety awareness, strength progressing(LUE precautions d/t new pacemaker), T/F independence.  Patient verbalized/demo ability to complete ADLs with setup and supervision.  Has family in<>out daily to assist if needed.    DISCHARGE STATUS     TO SELF    DISCHARGE CONDITION   GOOD     DISCHARGE REASON    reached max rehab potential/MET ALL GOALS  MD VISIT:  unknown  INSTRUCTIONS HOME PROGRAM PROVIDED TO:  patient  PATIENT CAREGIVER LEVEL OF COMPLIANCE:  good  UNMET NEEDS   N/A  SERVICES CONTINUING   PT  COMMUNICATION / CARE COORDINATION:  none  HHACN/MEDICARE 2 DAY NOTICE GIVEN:  no  PATIENT AND CAREGIVER ARE AGREEABLE WITH DISCHARGE PLAN   yes

## 2023-07-27 ENCOUNTER — ANTICOAGULATION VISIT (OUTPATIENT)
Dept: CARDIAC SURGERY | Facility: CLINIC | Age: 83
End: 2023-07-27
Payer: MEDICARE

## 2023-07-27 DIAGNOSIS — Z79.01 LONG TERM CURRENT USE OF ANTICOAGULANT THERAPY: Primary | ICD-10-CM

## 2023-07-27 DIAGNOSIS — I48.91 ATRIAL FIBRILLATION, UNSPECIFIED TYPE: ICD-10-CM

## 2023-07-27 DIAGNOSIS — Z51.81 ENCOUNTER FOR THERAPEUTIC DRUG MONITORING: ICD-10-CM

## 2023-07-27 LAB — INR PPP: 2 (ref 0.9–1.1)

## 2023-07-27 PROCEDURE — 93793 ANTICOAG MGMT PT WARFARIN: CPT | Performed by: NURSE PRACTITIONER

## 2023-07-27 PROCEDURE — 85610 PROTHROMBIN TIME: CPT | Performed by: NURSE PRACTITIONER

## 2023-07-27 PROCEDURE — 36416 COLLJ CAPILLARY BLOOD SPEC: CPT | Performed by: NURSE PRACTITIONER

## 2023-07-27 NOTE — PROGRESS NOTES
Pt states no meds changes or bleeding problems or unexplained bruising. Pt instructed to continue current dosage schedule. Pt verbalizes understanding. Pt instructed to test with home meter on 8/10. Patient instructed regarding medication; results given and questions answered. Nutritional counseling given.  Dietary factors affecting therapy addressed.  Patient instructed to monitor for excessive bruising or bleeding.  Findings reported by Jaqui Narvaez RN  Today's INR is   Lab Results - Last 18 Months   Lab Units 07/27/23  0000   INR  2.00*

## 2023-07-28 ENCOUNTER — HOME CARE VISIT (OUTPATIENT)
Dept: HOME HEALTH SERVICES | Facility: CLINIC | Age: 83
End: 2023-07-28
Payer: MEDICARE

## 2023-07-28 PROCEDURE — G0151 HHCP-SERV OF PT,EA 15 MIN: HCPCS

## 2023-08-01 VITALS
RESPIRATION RATE: 20 BRPM | TEMPERATURE: 97.6 F | DIASTOLIC BLOOD PRESSURE: 74 MMHG | SYSTOLIC BLOOD PRESSURE: 134 MMHG | HEART RATE: 88 BPM | OXYGEN SATURATION: 98 %

## 2023-08-03 ENCOUNTER — CLINICAL SUPPORT (OUTPATIENT)
Dept: CARDIOLOGY | Facility: CLINIC | Age: 83
End: 2023-08-03
Payer: MEDICARE

## 2023-08-03 DIAGNOSIS — Z95.0 PACEMAKER: Primary | ICD-10-CM

## 2023-08-10 RX ORDER — ISOSORBIDE MONONITRATE 30 MG/1
30 TABLET, EXTENDED RELEASE ORAL DAILY
Qty: 90 TABLET | Refills: 3 | Status: SHIPPED | OUTPATIENT
Start: 2023-08-10

## 2023-08-22 ENCOUNTER — OFFICE VISIT (OUTPATIENT)
Dept: CARDIOLOGY | Facility: CLINIC | Age: 83
End: 2023-08-22
Payer: MEDICARE

## 2023-08-22 ENCOUNTER — ANTICOAGULATION VISIT (OUTPATIENT)
Dept: CARDIAC SURGERY | Facility: CLINIC | Age: 83
End: 2023-08-22
Payer: MEDICARE

## 2023-08-22 VITALS
HEART RATE: 92 BPM | BODY MASS INDEX: 25.1 KG/M2 | OXYGEN SATURATION: 95 % | DIASTOLIC BLOOD PRESSURE: 74 MMHG | SYSTOLIC BLOOD PRESSURE: 130 MMHG | WEIGHT: 165.6 LBS | HEIGHT: 68 IN

## 2023-08-22 DIAGNOSIS — I48.91 ATRIAL FIBRILLATION, UNSPECIFIED TYPE: ICD-10-CM

## 2023-08-22 DIAGNOSIS — I25.119 CORONARY ARTERY DISEASE INVOLVING NATIVE CORONARY ARTERY OF NATIVE HEART WITH ANGINA PECTORIS: Primary | ICD-10-CM

## 2023-08-22 DIAGNOSIS — I10 ESSENTIAL HYPERTENSION: ICD-10-CM

## 2023-08-22 DIAGNOSIS — Z79.01 LONG TERM CURRENT USE OF ANTICOAGULANT THERAPY: Primary | ICD-10-CM

## 2023-08-22 DIAGNOSIS — E78.2 MIXED HYPERLIPIDEMIA: ICD-10-CM

## 2023-08-22 DIAGNOSIS — I49.5 SSS (SICK SINUS SYNDROME): ICD-10-CM

## 2023-08-22 DIAGNOSIS — Z51.81 ENCOUNTER FOR THERAPEUTIC DRUG MONITORING: ICD-10-CM

## 2023-08-22 LAB — INR PPP: 3.2 (ref 0.9–1.1)

## 2023-08-22 PROCEDURE — 3078F DIAST BP <80 MM HG: CPT | Performed by: INTERNAL MEDICINE

## 2023-08-22 PROCEDURE — 85610 PROTHROMBIN TIME: CPT | Performed by: NURSE PRACTITIONER

## 2023-08-22 PROCEDURE — 3075F SYST BP GE 130 - 139MM HG: CPT | Performed by: INTERNAL MEDICINE

## 2023-08-22 PROCEDURE — 36416 COLLJ CAPILLARY BLOOD SPEC: CPT | Performed by: NURSE PRACTITIONER

## 2023-08-22 PROCEDURE — 99213 OFFICE O/P EST LOW 20 MIN: CPT | Performed by: INTERNAL MEDICINE

## 2023-08-22 NOTE — PROGRESS NOTES
Kirsten Rosales  83 y.o. 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  Site healed very well she is a pleased with clinical outcome.  She will continue follow-up with Dr. Myers with a history of CAD s/p PCI in 25, sick sinus syndrome syndrome with chronic atrial fibrillation on long-term oral anticoagulation with warfarin tolerating very well. patient denies orthopnea PND chest pain lightheaded dizziness no intermittent cardiac issue reported.  No fever cough or chills.  Last cardiac cath was 2016 which revealed moderate disease in RCA and left circumflex system and patent stent in ramus.    She has been using 2 pillows.  No swelling  She has stable shortness of breath on exertion.  No chest pain.  Has been taking Coumadin without any bleeding problems.  Denying any palpitations.     01/25/2023:  No acute issues since last visit. Chronic SOB at baseline, reported her oxygen sats drop with exertion, I think she would benefit from ambulatory oxygen all the time.  She uses oxygen only at night currently.  Denying any chest pain.  No bleeding problems.  Denying any palpitations.  No chest pain with exertion.     07/20/2022:  Admitted to the hospital in March with pneumonia.  Reported improvement in symptoms of shortness of breath since treatment for pneumonia.  Denying any chest pain.    Test test March 2023      Findings consistent with an equivocal ECG stress test.  Left ventricular ejection fraction is normal. (Calculated EF = 61%).  Myocardial perfusion imaging indicates a medium-sized infarct located in the anterior wall, lateral wall and apex with no significant ischemia noted.  No sig ischemia is noted.  Cannot exclude arifact from breast tissue affecting  study.  March 2023      Left ventricular wall thickness is consistent with mild concentric hypertrophy.  Left ventricular ejection fraction appears to be 61 - 65%.  Left ventricular diastolic function was indeterminate.  The right atrial cavity is borderline dilated.  Estimated right ventricular systolic pressure from tricuspid regurgitation is mildly elevated (35-45 mmHg).  Left atrial volume is moderately increased.       SUBJECTIVE:    Allergies   Allergen Reactions    Aspirin Hives    Celebrex [Celecoxib] Hives    Rythmol [Propafenone] Nausea And Vomiting       Past Medical History:   Diagnosis Date    Atherosclerotic heart disease of native coronary artery with unspecified angina pectoris     Atrial fibrillation     Breath shortness     CAD (coronary artery disease)     COPD (chronic obstructive pulmonary disease)     GERD (gastroesophageal reflux disease)     Hyperlipidemia     Long term (current) use of anticoagulants     Sick sinus syndrome     Unspecified hypothyroidism        Past Surgical History:   Procedure Laterality Date    BACK SURGERY      CARDIAC CATHETERIZATION      2/2016    CARDIAC PACEMAKER PLACEMENT      CARDIOVERSION      CATARACT EXTRACTION W/ INTRAOCULAR LENS IMPLANT Right 03/16/2018    Procedure: CATARACT PHACO EXTRACTION WITH INTRAOCULAR LENS IMPLANT;  Surgeon: Umesh Thibodeaux MD;  Location: [facility] OR;  Service: Ophthalmology    CATARACT EXTRACTION W/ INTRAOCULAR LENS IMPLANT Left 03/23/2018    Procedure: CATARACT PHACO EXTRACTION WITH INTRAOCULAR LENS IMPLANT;  Surgeon: Umesh Thibodeaux MD;  Location: [facility] OR;  Service: Ophthalmology    COLONOSCOPY N/A 01/04/2018    Procedure: COLONOSCOPY;  Surgeon: Albino Juan DO;  Location: [facility] ENDOSCOPY;  Service:     COLONOSCOPY N/A 06/15/2021    Procedure: COLONOSCOPY;  Surgeon: Albino Juan DO;  Location: [facility] ENDOSCOPY;  Service: Gastroenterology;  Laterality: N/A;    PACEMAKER REPLACEMENT N/A 7/20/2023     Procedure: PPM generator change - dual;  Surgeon: Thu Elam MD;  Location: [facility] INVASIVE LOCATION;  Service: Cardiology;  Laterality: N/A;       History reviewed. No pertinent family history.    Social History     Socioeconomic History    Marital status:    Tobacco Use    Smoking status: Former     Types: Cigarettes     Quit date:      Years since quittin.6    Smokeless tobacco: Never    Tobacco comments:     man 30 years ago   Vaping Use    Vaping Use: Never used   Substance and Sexual Activity    Alcohol use: No    Drug use: No    Sexual activity: Defer       Current Outpatient Medications   Medication Sig Dispense Refill    Calcium-Magnesium-Vitamin D (CALCIUM MAGNESIUM PO) Take 1 Piece by mouth Daily. With vitamin d and zinc      Coenzyme Q10 (COQ10 PO) Take 10 mg by mouth Daily.      Cyanocobalamin (Vitamin B12) 1000 MCG tablet controlled-release Take 500 mcg by mouth Daily.      DULoxetine (CYMBALTA) 30 MG capsule Take 1 capsule by mouth Daily.      furosemide (LASIX) 20 MG tablet Take 1 tablet by mouth Daily.      isosorbide mononitrate (IMDUR) 30 MG 24 hr tablet Take 1 tablet by mouth Daily. 90 tablet 3    levothyroxine (SYNTHROID, LEVOTHROID) 112 MCG tablet Take 100 mcg by mouth Daily.      lisinopril (PRINIVIL,ZESTRIL) 5 MG tablet Take 1 tablet by mouth Daily. 30 tablet 11    metFORMIN (GLUCOPHAGE) 500 MG tablet Take 1 tablet by mouth 2 (Two) Times a Day.      metoprolol succinate XL (TOPROL-XL) 50 MG 24 hr tablet TAKE ONE TABLET BY MOUTH TWICE A  tablet 2    multivitamin with minerals tablet tablet Take 1 tablet by mouth Daily.      O2 (OXYGEN) Inhale 2 L/min Continuous.      Omega-3 Fatty Acids (OMEGA 3 PO) Take 1,800 mg by mouth Daily.      pravastatin (PRAVACHOL) 40 MG tablet Take 1 tablet by mouth Daily. 90 tablet 3    traZODone (DESYREL) 50 MG tablet TAKE ONE TO TWO TABLETS BY MOUTH NIGHTLY AS NEEDED FOR INSOMNIA 60 tablet 10    VITAMIN D, CHOLECALCIFEROL, PO Take  " by mouth.      warfarin (COUMADIN) 5 MG tablet TAKE 1 TABLET BY MOUTH NIGHTLY AS DIRECTED 30 tablet 5     No current facility-administered medications for this visit.       Review of Systems:   Constitutional:  no change in exercise tolerance.  HENT: Denies any hearing loss, epistaxis  Eyes: No blurred  Respiratory:  Denies dyspnea with exertion,no cough or wheezing  Cardiovascular: See H&P  Gastrointestinal:  Denies change in bowel habits, dyspepsia, ulcer disease  Endocrine: Negative for cold intolerance, heat intolerance, polydipsia, polyphagia  Genitourinary: Negative.  Musculoskeletal: Denies any history of arthritic symptoms or back problems.   Skin:  Denies rashes or skin lesions.   Allergic/Immunologic: Negative.  Negative for environmental allergies  Neurological:  Denies any history of recurrent headaches   Hematological: No history of easy bruisability  Psychiatric/Behavioral: Denies any history of depression    OBJECTIVE:    /74 (BP Location: Left arm, Patient Position: Sitting, Cuff Size: Adult)   Pulse 92   Ht 172.7 cm (68\")   Wt 75.1 kg (165 lb 9.6 oz)   SpO2 95%   BMI 25.18 kg/mý     Physical Exam:   Constitutional: Cooperative, alert and oriented, well-developed, well-nourished, in no acute distress.   Head: Normocephalic, conjunctive is pink, thyroid is nonpalpable, no jugular is distention  Cardiovascular regular rate/rhythm, no S3, no pericardial rub  Pulmonary/Chest: Chest positive bilateral, good air entry, no rales, no wheezing  Abdominal: Abdomen soft, bowel sounds normoactive  Musculoskeletal: No deformities, positive peripheral pulses  Neurological: No gross motor or sensory deficits noted, affect appropriate.   Skin: Warm and dry to the touch, no apparent skin lesions or masses noted.   Psychiatric: Normal mood and affect. Behavior is normal    Procedures    Lab Results   Component Value Date    WBC 9.45 07/20/2023    HGB 13.4 07/20/2023    HCT 40.7 07/20/2023    MCV 90.2 " 07/20/2023     07/20/2023     Lab Results   Component Value Date    GLUCOSE 118 (H) 07/20/2023    BUN 21 07/20/2023    CREATININE 0.72 07/20/2023    EGFRIFNONA 70 08/09/2019    EGFRIFAFRI 97 06/08/2021    BCR 29.2 (H) 07/20/2023    CO2 30.0 (H) 07/20/2023    CALCIUM 9.2 07/20/2023    ALBUMIN 4.3 07/04/2023    AST 65 (H) 07/04/2023    ALT 19 07/04/2023     Lab Results   Component Value Date    CHOL 165 06/13/2020    CHOL 135 08/09/2019    CHOL 152 07/06/2018     Lab Results   Component Value Date    TRIG 214 (H) 04/27/2023    TRIG 192 (H) 06/14/2022    TRIG 207 (H) 06/08/2021     Lab Results   Component Value Date    HDL 47 04/27/2023    HDL 49 06/14/2022    HDL 41 06/08/2021     No components found for: LDLCALC  Lab Results   Component Value Date    LDL 81 04/27/2023    LDL 94 06/14/2022    LDL 97 06/08/2021     No results found for: HDLLDLRATIO  No components found for: CHOLHDL  Lab Results   Component Value Date    HGBA1C 6.8 (H) 04/27/2023     Lab Results   Component Value Date    TSH 0.88 04/27/2023           ASSESSMENT AND PLAN:  CAD prior stent placement doing well no chest pain.  She takes Imdur for mild angina and she is doing tolerating the isosorbide 30 mg daily, I asked her to exercise regularly  Allergic to aspirin  On metoprolol XL.  Recent stress test without significant ischemia.     Sick sinus syndrome  Post pacemaker generator replacement site healed very well she is a pleased with clinical outcome.  She will continue follow-up with Dr. Myers and I will see her on a as needed basis.    Chronic atrial fibrillation/atrial flutter.  He is on rate controlling with metoprolol and feeling fine     Hyperlipidemia on pravastatin      Diabetes on metformin and A1c is good.    I spent 16 minutes caring for Kirsten on this date of service. This time includes time spent by me of counseling/coordination of care as relates to the presenting problem and any ordered procedures/tests as outlined above.           This document has been electronically signed by Thu Elam MD on August 22, 2023 10:51 CDT      Diagnoses and all orders for this visit:    1. Coronary artery disease involving native coronary artery of native heart with angina pectoris (Primary)    2. Mixed hyperlipidemia    3. Essential hypertension    4. SSS (sick sinus syndrome)    5. Atrial fibrillation, unspecified type          Thu Elam MD  8/22/2023  10:51 CDT

## 2023-08-22 NOTE — PROGRESS NOTES
Pt seen today in office following Dr. Marialuisa villa.  Pt denies bleeding issues.  Pt states she has been out of her eye vitamin but plans to restart that soon.  I adjusted warfarin dose and instructed pt to increase Vit K intake today with an okra serving.  Instructed pt to self-test next Wednesday, Aug 30th.  Instructions verbalized.  Patient instructed regarding medication; results given and questions answered. Nutritional counseling given.  Dietary factors affecting therapy addressed.  Patient instructed to monitor for excessive bruising or bleeding.  Findings reported by Baron Herrera RN.   Today's INR is   Lab Results - Last 18 Months   Lab Units 08/22/23  0000   INR  3.20*

## 2023-08-30 ENCOUNTER — ANTICOAGULATION VISIT (OUTPATIENT)
Dept: CARDIAC SURGERY | Facility: CLINIC | Age: 83
End: 2023-08-30
Payer: MEDICARE

## 2023-08-30 DIAGNOSIS — Z51.81 ENCOUNTER FOR THERAPEUTIC DRUG MONITORING: ICD-10-CM

## 2023-08-30 DIAGNOSIS — Z79.01 LONG TERM CURRENT USE OF ANTICOAGULANT THERAPY: Primary | ICD-10-CM

## 2023-08-30 DIAGNOSIS — I48.91 ATRIAL FIBRILLATION, UNSPECIFIED TYPE: ICD-10-CM

## 2023-08-30 LAB — INR PPP: 2.5

## 2023-08-30 NOTE — PROGRESS NOTES
Received results from patient with home meter by telephone. Pt states no meds changes or bleeding problems or unexplained bruising. Pt instructed to continue current dosage schedule. Pt verbalizes understanding. Will recheck in 3 weeks. Patient instructed regarding medication; results given and questions answered. Nutritional counseling given.  Dietary factors affecting therapy addressed.  Patient instructed to monitor for excessive bruising or bleeding.  Findings reported by Jaqui Narvaez RN  Today's INR is   Lab Results - Last 18 Months   Lab Units 08/30/23  0000   INR  2.50

## 2023-08-31 ENCOUNTER — TELEPHONE (OUTPATIENT)
Dept: CARDIOLOGY | Facility: CLINIC | Age: 83
End: 2023-08-31
Payer: MEDICARE

## 2023-08-31 DIAGNOSIS — E78.2 MIXED HYPERLIPIDEMIA: ICD-10-CM

## 2023-08-31 RX ORDER — METOPROLOL SUCCINATE 50 MG/1
50 TABLET, EXTENDED RELEASE ORAL 2 TIMES DAILY
Qty: 180 TABLET | Refills: 3 | Status: SHIPPED | OUTPATIENT
Start: 2023-08-31

## 2023-08-31 RX ORDER — PRAVASTATIN SODIUM 40 MG
40 TABLET ORAL DAILY
Qty: 90 TABLET | Refills: 3 | Status: SHIPPED | OUTPATIENT
Start: 2023-08-31

## 2023-08-31 NOTE — TELEPHONE ENCOUNTER
----- Message from Carmen Dubois MA sent at 8/31/2023  3:41 PM CDT -----  Regarding: FW: Prescription refills    ----- Message -----  From: Esthela Darling RegSched Rep  Sent: 8/31/2023   9:08 AM CDT  To: Carmen Dubois MA  Subject: Prescription refills                             Prescriptions: metoprolol succinate XL (TOPROL-XL) 50 MG 24 hr tablet and pravastatin (PRAVACHOL) 40 MG tablet    Pharmacy: [facility] in [place]    Patient is needing refills for these prescriptions, would like to have a prescription sent over. Thanks!

## 2025-04-10 NOTE — PROGRESS NOTES
[facility]  OFFICE NOTE    Cardiovascular Medicine  Jayda Myers M.D., RPVI         No referring provider defined for this encounter.    Thank you for asking me to see Kirsten Rosales for afib.    History of Present Illness  This is a 81 y.o. female with:    1. Coronary artery disease involving native coronary artery of native heart with angina pectoris (CMS/HCC)    2. Mixed hyperlipidemia    3. Essential hypertension    4. Permanent atrial fibrillation (CMS/[facility])    5. Type 2 diabetes mellitus without complication, without long-term current use of insulin (CMS/[facility])          Chief complaint -Follow-up CAD        History of present Illness- 80-year-old lady with history of coronary disease prior stent placement in 2005, has chronic atrial fibrillation with sick sinus syndrome on warfarin for anticoagulation.  Last cardiac cath was in 2016 which showed moderate disease in the RCA and left circumflex and patent stent in the ramus. She is doing well no chest pain or shortness of breath. she has her pacemaker checked regularly through the pacemaker clinic.  She denies any GI symptoms or CNS symptoms.     No acute issues since last visit Dr. Silver.  She occasionally has some fullness in her chest when she lays down at night however when she gets up and take a deep breath it resolves pain orthopnea or PND though.  She has been using 2 pillows.  No swelling  She has stable shortness of breath on exertion.  No chest pain.  Has been taking Coumadin without any bleeding problems.  Denying any palpitations.    11/16/2020:  Patient reported over last several months has been having worsening orthopnea.  She was hemogram her care physician BNP was mildly elevated.  Denying any chest pain or palpitations.  She did have a fall in September.    02/03/2021:  No acute issues since last visit continues to have intermittent orthopnea.  She is taking Lasix with improvement in her symptoms.   [Medication Risks Reviewed] : Medication risks reviewed Echocardiogram is normal LV systolic function.  No bleeding problems from Coumadin.    Review of Systems - ROS  Constitution: Negative for weakness, weight gain and weight loss.   HENT: Negative for congestion.    Eyes: Negative for blurred vision.   Cardiovascular: As mentioned above  Respiratory: Negative for cough and hemoptysis.    Endocrine: Negative for polydipsia and polyuria.   Hematologic/Lymphatic: Negative for bleeding problem. Does not bruise/bleed easily.   Skin: Negative for flushing.   Musculoskeletal: Negative for neck pain and stiffness.   Gastrointestinal: Negative for abdominal pain, diarrhea, jaundice, melena, nausea and vomiting.   Genitourinary: Negative for dysuria and hematuria.   Neurological: Negative for dizziness, focal weakness and numbness.   Psychiatric/Behavioral: Negative for altered mental status and depression.          All other systems were reviewed and were negative.    family history is not on file.     reports that she has quit smoking. She has never used smokeless tobacco. She reports that she does not drink alcohol or use drugs.    Allergies   Allergen Reactions   • Aspirin Hives   • Celebrex [Celecoxib] Hives   • Rythmol [Propafenone] Nausea And Vomiting         Current Outpatient Medications:   •  albuterol sulfate  (90 Base) MCG/ACT inhaler, Inhale 2 puffs., Disp: , Rfl:   •  furosemide (LASIX) 20 MG tablet, TAKE ONE TABLET BY MOUTH EVERY MORNING, Disp: , Rfl:   •  isosorbide mononitrate (IMDUR) 30 MG 24 hr tablet, TAKE ONE TABLET BY MOUTH DAILY, Disp: 30 tablet, Rfl: 3  •  levothyroxine (SYNTHROID, LEVOTHROID) 112 MCG tablet, Take 112 mcg by mouth Daily., Disp: , Rfl:   •  magnesium oxide (MAG-OX) 400 MG tablet, Take 400 mg by mouth., Disp: , Rfl:   •  metFORMIN (GLUCOPHAGE) 500 MG tablet, Take 500 mg by mouth 2 (Two) Times a Day., Disp: , Rfl:   •  metoprolol succinate XL (TOPROL-XL) 50 MG 24 hr tablet, Take 1 tablet by mouth 2 (Two) Times a Day., Disp: 180  [de-identified] : Pt has failed conservative treatment including CSI inj and 6 weeks of PT and pt continued to have persistent pain and night pain she is indicated for MRI R shoulder to r/o rotator cuff injury and  sub-coracoid impingement  f/u p MRI  ----------------------------------------------------------------------------   All relevant imaging studies pertinent to today's visit, including x-rays, MRI's and/or other advanced imaging studies (CT/etc) were independently interpreted and reviewed with the patient as needed. Implications of the studies together with the patient's clinical picture were discussed to formulate a working diagnosis and management options were detailed.   The patient and/or guardian was advised of the diagnosis.  The natural history of the pathology was explained in full. All questions were answered.  The risks and benefits of conservative and interventional treatment alternatives were explained to the patient   The patient and/or guardian was advised if any advanced diagnostic/imaging study (MRI/CT/etc) is ordered to evaluate potential pathology in the affected area(s), they should follow up in the office to review the results of the study and determine further management that may be indicated.   "tablet, Rfl: 3  •  Omega-3 Fatty Acids (OMEGA 3 PO), Take 2,000 mg by mouth Daily., Disp: , Rfl:   •  oxybutynin XL (DITROPAN-XL) 10 MG 24 hr tablet, TAKE ONE TABLET BY MOUTH DAILY, Disp: , Rfl:   •  pravastatin (PRAVACHOL) 40 MG tablet, Take 1 tablet by mouth Daily., Disp: 90 tablet, Rfl: 3  •  Ubiquinone (ULTRA COQ10 PO), Take 1 tablet by mouth Daily., Disp: , Rfl:   •  warfarin (COUMADIN) 5 MG tablet, TAKE ONE TABLET BY MOUTH ONCE NIGHTLY OR AS DIRECTED BY THE COUMADIN CLINIC, Disp: 30 tablet, Rfl: 5  •  docusate sodium (COLACE) 100 MG capsule, Take 100 mg by mouth As Needed., Disp: , Rfl:   •  Melatonin 10 MG tablet, Take 20 mg by mouth As Needed., Disp: , Rfl:   •  mirtazapine (REMERON) 7.5 MG half tablet, Take 7.5 mg by mouth Every Night., Disp: , Rfl:     Physical Exam:  Vitals:    02/03/21 0854   BP: 122/80   Pulse: 68   SpO2: 96%   Weight: 79.4 kg (175 lb)   Height: 172.7 cm (68\")   PainSc: 0-No pain     Current Pain Level: none  Pulse Ox: Normal  on room air  General: alert, appears stated age and cooperative     Body Habitus: well-nourished    HEENT: Head: Normocephalic, no lesions, without obvious abnormality. No arcus senilis, xanthelasma or xanthomas.    Neuro: alert, oriented x3  Pulses: 2+ and symmetric  JVP: Volume/Pulsation: Normal.  Normal waveforms.   Appropriate inspiratory decrease.  No Kussmaul's. No Jaylin's.   Carotid Exam: no bruit normal pulsation bilaterally   Carotid Volume: normal.     Respirations: no increased work of breathing   Chest:  Normal    Pulmonary:Normal   Precordium: Normal impulses. P2 is not palpable.  RV Heave: absent  LV Heave: absent  [place]:  normal size and placement  Palpable S4: absent.  Heart rate: normal    Heart Rhythm: irregular     Heart Sounds: S1: normal  S2: normal  S3: absent   S4: absent  Opening Snap: absent    Pericardial Rub:  Absent: .    Abdomen:   Appearance: normal .  Palpation: Soft, non-tender to palpation, bowel sounds positive in all four " quadrants; no guarding or rebound tenderness  Extremity: no edema.   LE Skin: no rashes  LE Hair:  normal  LE Pulses: well perfused with normal pulses in the distal extremities  Pallor on elevation: Absent. Rubor on dependency: None      DATA REVIEWED:     EKG. I personally reviewed and interpreted the EKG.  Atrial flutter with variable AV block.    ECG/EMG Results (all)     None        ---------------------------------------------------  TTE/DEENA:  Results for orders placed in visit on 11/16/20   Adult Transthoracic Echo Complete W/ Cont if Necessary Per Protocol    Narrative · Left ventricular ejection fraction appears to be 56 - 60%. Left   ventricular systolic function is normal.  · Estimated right ventricular systolic pressure from tricuspid   regurgitation is normal (<35 mmHg).  · Left atrial volume is moderately increased.  · Left ventricular diastolic function was indeterminate.  · Mild mitral annular calcification is present. Mild mitral valve   regurgitation is present with a posteriorly-directed jet noted. No   significant mitral valve stenosis is present.          --------------------------------------------------------------------------------------------------  LABS:     The CVD Risk score (MARIA ELENA'Agostino, et al., 2008) failed to calculate for the following reasons:    The 2008 CVD risk score is only valid for ages 30 to 74         Lab Results   Component Value Date    GLUCOSE 100 (H) 08/09/2019    BUN 21 08/09/2019    CREATININE 0.79 08/09/2019    EGFRIFNONA 70 08/09/2019    BCR 26.6 (H) 08/09/2019    K 4.8 08/09/2019    CO2 28.0 08/09/2019    CALCIUM 9.3 08/09/2019    ALBUMIN 4.20 08/09/2019    AST 50 (H) 08/09/2019    ALT 13 08/09/2019     Lab Results   Component Value Date    WBC 5.49 08/09/2019    HGB 14.2 08/09/2019    HCT 44.0 08/09/2019    MCV 89.6 08/09/2019     08/09/2019     Lab Results   Component Value Date    CHOL 165 06/13/2020    CHLPL 150 02/09/2016    TRIG 191 (H) 06/13/2020    HDL  49 06/13/2020    LDL 60 06/13/2020     Lab Results   Component Value Date    TSH 1.120 08/09/2019     Lab Results   Component Value Date    CKTOTAL 51 02/10/2014    CKMB 1.0 02/10/2014    TROPONINI <0.012 02/10/2014     Lab Results   Component Value Date    HGBA1C 6.5 (H) 09/02/2020     No results found for: DDIMER  Lab Results   Component Value Date    ALT 13 08/09/2019     Lab Results   Component Value Date    HGBA1C 6.5 (H) 09/02/2020    HGBA1C 6.7 (H) 06/13/2020    HGBA1C 6.30 (H) 08/09/2019     Lab Results   Component Value Date    CREATININE 0.79 08/09/2019     No results found for: IRON, TIBC, FERRITIN  Lab Results   Component Value Date    INR 2.20 01/13/2021    INR 2.30 12/16/2020    INR 2.50 11/18/2020    PROTIME 14.0 02/18/2016    PROTIME 15.2 02/14/2014    PROTIME 16.6 (H) 02/13/2014       Assessment/Plan     CAD prior stent placement doing well no chest pain.  She takes Imdur for mild angina and she is doing tolerating the isosorbide 30 mg daily, I asked her to exercise regularly     Chronic atrial fibrillation with sick sinus syndrome status post pacemaker on warfarin and rate controlled with Toprol-XL.  I discussed options of atrial flutter ablation and Novacks however patient wants to continue medical therapy and Coumadin at this point.  Echo 11/20 with EF of 56-60%. LA was moderately dilated.      Hyperlipidemia on pravastatin      Diabetes on metformin and A1c is good.     Hypothyroidism on Synthroid supplements    Shortness of breath:  Echo with normal LV sysolic function, diastolic function was indeterminate.  Her symptoms are predominantly when she is sleeping bedtime.  She has been seen by pulmonary has been started on inhalers.  Referring her to sleep clinic.    Prevention:  Patient's Body mass index is 26.61 kg/m². BMI is above normal parameters. Recommendations include: exercise counseling and nutrition counseling.      Kirsten Rosales  reports that she has quit smoking. She has never  used smokeless tobacco..    AAA Screening:   Not needed    No follow-ups on file.          This document has been electronically signed by Jayda Myers MD on February 3, 2021 09:08 CST

## (undated) DEVICE — SINGLE-USE BIOPSY FORCEPS: Brand: RADIAL JAW 4

## (undated) DEVICE — GLV SURG SENSICARE GREEN W/ALOE PF LF 6 STRL

## (undated) DEVICE — GOWN,AURORA,NOREINF,RAGLAN,XL,STERILE: Brand: MEDLINE

## (undated) DEVICE — CANN SMPL SOFTECH BIFLO ETCO2 A/M 7FT

## (undated) DEVICE — SOL IRR H2O BTL 1000ML STRL

## (undated) DEVICE — Device

## (undated) DEVICE — PK PM 60

## (undated) DEVICE — GLV SURG SENSICARE GREEN W/ALOE PF LF 6.5 STRL

## (undated) DEVICE — GLV SURG TRIUMPH LT PF LTX 7.5 STRL

## (undated) DEVICE — ELECTRODE,RT,STRESS,FOAM,50PK: Brand: MEDLINE

## (undated) DEVICE — TBG PENCL TELESCP MEGADYNE SMOKE EVAC 10FT

## (undated) DEVICE — TRAP SXN POLYP QUICKCATCH LF

## (undated) DEVICE — Device: Brand: DISPOSABLE ELECTROSURGICAL SNARE

## (undated) DEVICE — PAD GRND REM POLYHESIVE A/ DISP